# Patient Record
Sex: MALE | Race: WHITE | URBAN - METROPOLITAN AREA
[De-identification: names, ages, dates, MRNs, and addresses within clinical notes are randomized per-mention and may not be internally consistent; named-entity substitution may affect disease eponyms.]

---

## 2019-03-18 ENCOUNTER — HISTORICAL (OUTPATIENT)
Dept: RADIOLOGY | Facility: HOSPITAL | Age: 74
End: 2019-03-18

## 2019-05-06 ENCOUNTER — HISTORICAL (OUTPATIENT)
Dept: RADIOLOGY | Facility: HOSPITAL | Age: 74
End: 2019-05-06

## 2024-04-10 ENCOUNTER — HOSPITAL ENCOUNTER (INPATIENT)
Facility: HOSPITAL | Age: 79
LOS: 7 days | Discharge: NURSING, REHAB OR LONG-TERM CARE | DRG: 481 | End: 2024-04-17
Attending: EMERGENCY MEDICINE | Admitting: INTERNAL MEDICINE
Payer: MEDICARE

## 2024-04-10 DIAGNOSIS — S72.001A CLOSED FRACTURE OF RIGHT HIP, INITIAL ENCOUNTER: Primary | ICD-10-CM
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
## 2024-04-10 DIAGNOSIS — R07.9 CHEST PAIN: ICD-10-CM

## 2024-04-10 DIAGNOSIS — I10 HYPERTENSION: ICD-10-CM

## 2024-04-10 DIAGNOSIS — Z01.818 PREOP TESTING: ICD-10-CM

## 2024-04-10 LAB
ABORH RETYPE: NORMAL
ALBUMIN SERPL-MCNC: 3.3 G/DL (ref 3.4–4.8)
ALBUMIN/GLOB SERPL: 0.8 RATIO (ref 1.1–2)
ALP SERPL-CCNC: 44 UNIT/L (ref 40–150)
ALT SERPL-CCNC: 13 UNIT/L (ref 0–55)
APTT PPP: 28.9 SECONDS (ref 23.2–33.7)
AST SERPL-CCNC: 14 UNIT/L (ref 5–34)
BASOPHILS # BLD AUTO: 0.06 X10(3)/MCL
BASOPHILS NFR BLD AUTO: 0.3 %
BILIRUB SERPL-MCNC: 0.4 MG/DL
BUN SERPL-MCNC: 18.1 MG/DL (ref 8.4–25.7)
CALCIUM SERPL-MCNC: 9.5 MG/DL (ref 8.8–10)
CHLORIDE SERPL-SCNC: 106 MMOL/L (ref 98–107)
CO2 SERPL-SCNC: 23 MMOL/L (ref 23–31)
CREAT SERPL-MCNC: 0.99 MG/DL (ref 0.73–1.18)
EOSINOPHIL # BLD AUTO: 0.02 X10(3)/MCL (ref 0–0.9)
EOSINOPHIL NFR BLD AUTO: 0.1 %
ERYTHROCYTE [DISTWIDTH] IN BLOOD BY AUTOMATED COUNT: 12.7 % (ref 11.5–17)
GFR SERPLBLD CREATININE-BSD FMLA CKD-EPI: >60 MLS/MIN/1.73/M2
GLOBULIN SER-MCNC: 3.9 GM/DL (ref 2.4–3.5)
GLUCOSE SERPL-MCNC: 165 MG/DL (ref 82–115)
GROUP & RH: NORMAL
HCT VFR BLD AUTO: 42.1 % (ref 42–52)
HGB BLD-MCNC: 14.1 G/DL (ref 14–18)
IMM GRANULOCYTES # BLD AUTO: 0.12 X10(3)/MCL (ref 0–0.04)
IMM GRANULOCYTES NFR BLD AUTO: 0.6 %
INDIRECT COOMBS: NORMAL
INR PPP: 1.1
LYMPHOCYTES # BLD AUTO: 1.12 X10(3)/MCL (ref 0.6–4.6)
LYMPHOCYTES NFR BLD AUTO: 5.9 %
MCH RBC QN AUTO: 30.5 PG (ref 27–31)
MCHC RBC AUTO-ENTMCNC: 33.5 G/DL (ref 33–36)
MCV RBC AUTO: 91.1 FL (ref 80–94)
MONOCYTES # BLD AUTO: 0.95 X10(3)/MCL (ref 0.1–1.3)
MONOCYTES NFR BLD AUTO: 5 %
NEUTROPHILS # BLD AUTO: 16.83 X10(3)/MCL (ref 2.1–9.2)
NEUTROPHILS NFR BLD AUTO: 88.1 %
NRBC BLD AUTO-RTO: 0 %
PLATELET # BLD AUTO: 250 X10(3)/MCL (ref 130–400)
PMV BLD AUTO: 9.9 FL (ref 7.4–10.4)
POTASSIUM SERPL-SCNC: 3.7 MMOL/L (ref 3.5–5.1)
PROT SERPL-MCNC: 7.2 GM/DL (ref 5.8–7.6)
PROTHROMBIN TIME: 14 SECONDS (ref 12.5–14.5)
RBC # BLD AUTO: 4.62 X10(6)/MCL (ref 4.7–6.1)
SODIUM SERPL-SCNC: 141 MMOL/L (ref 136–145)
SPECIMEN OUTDATE: NORMAL
WBC # SPEC AUTO: 19.1 X10(3)/MCL (ref 4.5–11.5)

## 2024-04-10 PROCEDURE — 85730 THROMBOPLASTIN TIME PARTIAL: CPT | Performed by: EMERGENCY MEDICINE

## 2024-04-10 PROCEDURE — 25000003 PHARM REV CODE 250: Performed by: INTERNAL MEDICINE

## 2024-04-10 PROCEDURE — 63600175 PHARM REV CODE 636 W HCPCS: Performed by: INTERNAL MEDICINE

## 2024-04-10 PROCEDURE — 80053 COMPREHEN METABOLIC PANEL: CPT

## 2024-04-10 PROCEDURE — 85025 COMPLETE CBC W/AUTO DIFF WBC: CPT

## 2024-04-10 PROCEDURE — 96375 TX/PRO/DX INJ NEW DRUG ADDON: CPT

## 2024-04-10 PROCEDURE — 96374 THER/PROPH/DIAG INJ IV PUSH: CPT

## 2024-04-10 PROCEDURE — 63600175 PHARM REV CODE 636 W HCPCS: Performed by: EMERGENCY MEDICINE

## 2024-04-10 PROCEDURE — 99285 EMERGENCY DEPT VISIT HI MDM: CPT | Mod: 25

## 2024-04-10 PROCEDURE — 86901 BLOOD TYPING SEROLOGIC RH(D): CPT

## 2024-04-10 PROCEDURE — 85610 PROTHROMBIN TIME: CPT

## 2024-04-10 PROCEDURE — 93010 ELECTROCARDIOGRAM REPORT: CPT | Mod: ,,, | Performed by: INTERNAL MEDICINE

## 2024-04-10 PROCEDURE — 11000001 HC ACUTE MED/SURG PRIVATE ROOM

## 2024-04-10 PROCEDURE — 93005 ELECTROCARDIOGRAM TRACING: CPT

## 2024-04-10 PROCEDURE — 99223 1ST HOSP IP/OBS HIGH 75: CPT | Mod: 57,,, | Performed by: ORTHOPAEDIC SURGERY

## 2024-04-10 RX ORDER — CLONIDINE HYDROCHLORIDE 0.2 MG/1
0.2 TABLET ORAL 3 TIMES DAILY PRN
Status: DISCONTINUED | OUTPATIENT
Start: 2024-04-10 | End: 2024-04-17 | Stop reason: HOSPADM

## 2024-04-10 RX ORDER — ENOXAPARIN SODIUM 100 MG/ML
40 INJECTION SUBCUTANEOUS EVERY 24 HOURS
Status: DISCONTINUED | OUTPATIENT
Start: 2024-04-11 | End: 2024-04-17 | Stop reason: HOSPADM

## 2024-04-10 RX ORDER — HYDROCODONE BITARTRATE AND ACETAMINOPHEN 5; 325 MG/1; MG/1
1 TABLET ORAL EVERY 6 HOURS PRN
Status: DISCONTINUED | OUTPATIENT
Start: 2024-04-10 | End: 2024-04-13

## 2024-04-10 RX ORDER — MORPHINE SULFATE 4 MG/ML
4 INJECTION, SOLUTION INTRAMUSCULAR; INTRAVENOUS EVERY 4 HOURS PRN
Status: DISCONTINUED | OUTPATIENT
Start: 2024-04-10 | End: 2024-04-13

## 2024-04-10 RX ORDER — SODIUM CHLORIDE 0.9 % (FLUSH) 0.9 %
10 SYRINGE (ML) INJECTION
Status: DISCONTINUED | OUTPATIENT
Start: 2024-04-11 | End: 2024-04-17 | Stop reason: HOSPADM

## 2024-04-10 RX ORDER — MORPHINE SULFATE 4 MG/ML
4 INJECTION, SOLUTION INTRAMUSCULAR; INTRAVENOUS
Status: COMPLETED | OUTPATIENT
Start: 2024-04-10 | End: 2024-04-10

## 2024-04-10 RX ORDER — AMOXICILLIN 250 MG
2 CAPSULE ORAL 2 TIMES DAILY PRN
Status: DISCONTINUED | OUTPATIENT
Start: 2024-04-11 | End: 2024-04-13

## 2024-04-10 RX ORDER — ALUMINUM HYDROXIDE, MAGNESIUM HYDROXIDE, AND SIMETHICONE 1200; 120; 1200 MG/30ML; MG/30ML; MG/30ML
30 SUSPENSION ORAL 4 TIMES DAILY PRN
Status: DISCONTINUED | OUTPATIENT
Start: 2024-04-11 | End: 2024-04-17 | Stop reason: HOSPADM

## 2024-04-10 RX ORDER — METHOCARBAMOL 500 MG/1
500 TABLET, FILM COATED ORAL 4 TIMES DAILY PRN
Status: DISCONTINUED | OUTPATIENT
Start: 2024-04-10 | End: 2024-04-13

## 2024-04-10 RX ORDER — SODIUM CHLORIDE, SODIUM LACTATE, POTASSIUM CHLORIDE, CALCIUM CHLORIDE 600; 310; 30; 20 MG/100ML; MG/100ML; MG/100ML; MG/100ML
INJECTION, SOLUTION INTRAVENOUS CONTINUOUS
Status: ACTIVE | OUTPATIENT
Start: 2024-04-10 | End: 2024-04-11

## 2024-04-10 RX ORDER — POLYETHYLENE GLYCOL 3350 17 G/17G
17 POWDER, FOR SOLUTION ORAL 2 TIMES DAILY PRN
Status: DISCONTINUED | OUTPATIENT
Start: 2024-04-11 | End: 2024-04-17 | Stop reason: HOSPADM

## 2024-04-10 RX ORDER — ONDANSETRON HYDROCHLORIDE 2 MG/ML
4 INJECTION, SOLUTION INTRAVENOUS EVERY 4 HOURS PRN
Status: DISCONTINUED | OUTPATIENT
Start: 2024-04-10 | End: 2024-04-17 | Stop reason: HOSPADM

## 2024-04-10 RX ORDER — TALC
6 POWDER (GRAM) TOPICAL NIGHTLY PRN
Status: DISCONTINUED | OUTPATIENT
Start: 2024-04-11 | End: 2024-04-17 | Stop reason: HOSPADM

## 2024-04-10 RX ORDER — LABETALOL HYDROCHLORIDE 5 MG/ML
10 INJECTION, SOLUTION INTRAVENOUS EVERY 4 HOURS PRN
Status: DISCONTINUED | OUTPATIENT
Start: 2024-04-10 | End: 2024-04-17 | Stop reason: HOSPADM

## 2024-04-10 RX ORDER — HYDRALAZINE HYDROCHLORIDE 20 MG/ML
10 INJECTION INTRAMUSCULAR; INTRAVENOUS EVERY 4 HOURS PRN
Status: DISCONTINUED | OUTPATIENT
Start: 2024-04-10 | End: 2024-04-17 | Stop reason: HOSPADM

## 2024-04-10 RX ORDER — ACETAMINOPHEN 325 MG/1
650 TABLET ORAL EVERY 4 HOURS PRN
Status: DISCONTINUED | OUTPATIENT
Start: 2024-04-11 | End: 2024-04-17 | Stop reason: HOSPADM

## 2024-04-10 RX ORDER — ONDANSETRON HYDROCHLORIDE 2 MG/ML
4 INJECTION, SOLUTION INTRAVENOUS
Status: COMPLETED | OUTPATIENT
Start: 2024-04-10 | End: 2024-04-10

## 2024-04-10 RX ORDER — PROCHLORPERAZINE EDISYLATE 5 MG/ML
5 INJECTION INTRAMUSCULAR; INTRAVENOUS EVERY 6 HOURS PRN
Status: DISCONTINUED | OUTPATIENT
Start: 2024-04-11 | End: 2024-04-17 | Stop reason: HOSPADM

## 2024-04-10 RX ADMIN — METHOCARBAMOL 500 MG: 500 TABLET ORAL at 11:04

## 2024-04-10 RX ADMIN — ONDANSETRON 4 MG: 2 INJECTION INTRAMUSCULAR; INTRAVENOUS at 09:04

## 2024-04-10 RX ADMIN — MORPHINE SULFATE 4 MG: 4 INJECTION, SOLUTION INTRAMUSCULAR; INTRAVENOUS at 11:04

## 2024-04-10 RX ADMIN — MORPHINE SULFATE 4 MG: 4 INJECTION INTRAVENOUS at 09:04

## 2024-04-10 RX ADMIN — SODIUM CHLORIDE, POTASSIUM CHLORIDE, SODIUM LACTATE AND CALCIUM CHLORIDE: 600; 310; 30; 20 INJECTION, SOLUTION INTRAVENOUS at 11:04

## 2024-04-10 NOTE — Clinical Note
Diagnosis: Closed fracture of right hip, initial encounter [035905]   Future Attending Provider: ISRAEL SULLIVAN [[id number]]   Admit to which facility:: [facility] [[id number]]   Reason for IP Medical Treatment  (Clinical interventions that can only be accomplished in the IP setting? ) :: Orthopedic surgery   I certify that Inpatient services for greater than or equal to 2 midnights are medically necessary:: Yes   Plans for Post-Acute care--if anticipated (pick the single best option):: A. No post acute care anticipated at this time   Special Needs:: Wheelchair or Bed Bound [2]

## 2024-04-11 ENCOUNTER — ANESTHESIA EVENT (OUTPATIENT)
Dept: SURGERY | Facility: HOSPITAL | Age: 79
DRG: 481 | End: 2024-04-11
Payer: MEDICARE

## 2024-04-11 ENCOUNTER — ANESTHESIA (OUTPATIENT)
Dept: SURGERY | Facility: HOSPITAL | Age: 79
DRG: 481 | End: 2024-04-11
Payer: MEDICARE

## 2024-04-11 LAB
ANION GAP SERPL CALC-SCNC: 10 MEQ/L
APPEARANCE UR: CLEAR
BACTERIA #/AREA URNS AUTO: ABNORMAL /HPF
BASOPHILS # BLD AUTO: 0.03 X10(3)/MCL
BASOPHILS NFR BLD AUTO: 0.3 %
BILIRUB UR QL STRIP.AUTO: NEGATIVE
BUN SERPL-MCNC: 17.3 MG/DL (ref 8.4–25.7)
CALCIUM SERPL-MCNC: 9.2 MG/DL (ref 8.8–10)
CHLORIDE SERPL-SCNC: 106 MMOL/L (ref 98–107)
CO2 SERPL-SCNC: 25 MMOL/L (ref 23–31)
COLOR UR AUTO: ABNORMAL
CREAT SERPL-MCNC: 0.9 MG/DL (ref 0.73–1.18)
CREAT/UREA NIT SERPL: 19
EOSINOPHIL # BLD AUTO: 0 X10(3)/MCL (ref 0–0.9)
EOSINOPHIL NFR BLD AUTO: 0 %
ERYTHROCYTE [DISTWIDTH] IN BLOOD BY AUTOMATED COUNT: 12.8 % (ref 11.5–17)
GFR SERPLBLD CREATININE-BSD FMLA CKD-EPI: >60 MLS/MIN/1.73/M2
GLUCOSE SERPL-MCNC: 150 MG/DL (ref 82–115)
GLUCOSE UR QL STRIP.AUTO: NORMAL
HCT VFR BLD AUTO: 37.4 % (ref 42–52)
HGB BLD-MCNC: 12.4 G/DL (ref 14–18)
IMM GRANULOCYTES # BLD AUTO: 0.04 X10(3)/MCL (ref 0–0.04)
IMM GRANULOCYTES NFR BLD AUTO: 0.4 %
KETONES UR QL STRIP.AUTO: ABNORMAL
LEUKOCYTE ESTERASE UR QL STRIP.AUTO: NEGATIVE
LYMPHOCYTES # BLD AUTO: 1.29 X10(3)/MCL (ref 0.6–4.6)
LYMPHOCYTES NFR BLD AUTO: 13 %
MCH RBC QN AUTO: 30.1 PG (ref 27–31)
MCHC RBC AUTO-ENTMCNC: 33.2 G/DL (ref 33–36)
MCV RBC AUTO: 90.8 FL (ref 80–94)
MONOCYTES # BLD AUTO: 0.76 X10(3)/MCL (ref 0.1–1.3)
MONOCYTES NFR BLD AUTO: 7.6 %
MUCOUS THREADS URNS QL MICRO: ABNORMAL /LPF
NEUTROPHILS # BLD AUTO: 7.84 X10(3)/MCL (ref 2.1–9.2)
NEUTROPHILS NFR BLD AUTO: 78.7 %
NITRITE UR QL STRIP.AUTO: NEGATIVE
NRBC BLD AUTO-RTO: 0 %
OHS QRS DURATION: 80 MS
OHS QRS DURATION: 80 MS
OHS QTC CALCULATION: 434 MS
OHS QTC CALCULATION: 442 MS
PH UR STRIP.AUTO: 5 [PH]
PLATELET # BLD AUTO: 196 X10(3)/MCL (ref 130–400)
PMV BLD AUTO: 9.8 FL (ref 7.4–10.4)
POTASSIUM SERPL-SCNC: 4.1 MMOL/L (ref 3.5–5.1)
PROT UR QL STRIP.AUTO: ABNORMAL
RBC # BLD AUTO: 4.12 X10(6)/MCL (ref 4.7–6.1)
RBC #/AREA URNS AUTO: ABNORMAL /HPF
RBC UR QL AUTO: ABNORMAL
SODIUM SERPL-SCNC: 141 MMOL/L (ref 136–145)
SP GR UR STRIP.AUTO: 1.02 (ref 1–1.03)
SQUAMOUS #/AREA URNS LPF: ABNORMAL /HPF
UROBILINOGEN UR STRIP-ACNC: NORMAL
WBC # SPEC AUTO: 9.96 X10(3)/MCL (ref 4.5–11.5)
WBC #/AREA URNS AUTO: ABNORMAL /HPF

## 2024-04-11 PROCEDURE — 93005 ELECTROCARDIOGRAM TRACING: CPT

## 2024-04-11 PROCEDURE — 27201423 OPTIME MED/SURG SUP & DEVICES STERILE SUPPLY: Performed by: ORTHOPAEDIC SURGERY

## 2024-04-11 PROCEDURE — 27245 TREAT THIGH FRACTURE: CPT | Mod: AS,RT,,

## 2024-04-11 PROCEDURE — 85025 COMPLETE CBC W/AUTO DIFF WBC: CPT | Performed by: INTERNAL MEDICINE

## 2024-04-11 PROCEDURE — 63600175 PHARM REV CODE 636 W HCPCS

## 2024-04-11 PROCEDURE — 63600175 PHARM REV CODE 636 W HCPCS: Performed by: INTERNAL MEDICINE

## 2024-04-11 PROCEDURE — C1769 GUIDE WIRE: HCPCS | Performed by: ORTHOPAEDIC SURGERY

## 2024-04-11 PROCEDURE — 25000003 PHARM REV CODE 250

## 2024-04-11 PROCEDURE — 97166 OT EVAL MOD COMPLEX 45 MIN: CPT

## 2024-04-11 PROCEDURE — 25000003 PHARM REV CODE 250: Performed by: INTERNAL MEDICINE

## 2024-04-11 PROCEDURE — 0QS636Z REPOSITION RIGHT UPPER FEMUR WITH INTRAMEDULLARY INTERNAL FIXATION DEVICE, PERCUTANEOUS APPROACH: ICD-10-PCS | Performed by: ORTHOPAEDIC SURGERY

## 2024-04-11 PROCEDURE — 71000033 HC RECOVERY, INTIAL HOUR: Performed by: ORTHOPAEDIC SURGERY

## 2024-04-11 PROCEDURE — 37000009 HC ANESTHESIA EA ADD 15 MINS: Performed by: ORTHOPAEDIC SURGERY

## 2024-04-11 PROCEDURE — 80048 BASIC METABOLIC PNL TOTAL CA: CPT | Performed by: INTERNAL MEDICINE

## 2024-04-11 PROCEDURE — 36000711: Performed by: ORTHOPAEDIC SURGERY

## 2024-04-11 PROCEDURE — D9220A PRA ANESTHESIA: Mod: ANES,,, | Performed by: ANESTHESIOLOGY

## 2024-04-11 PROCEDURE — 27245 TREAT THIGH FRACTURE: CPT | Mod: RT,,, | Performed by: ORTHOPAEDIC SURGERY

## 2024-04-11 PROCEDURE — 93010 ELECTROCARDIOGRAM REPORT: CPT | Mod: ,,, | Performed by: INTERNAL MEDICINE

## 2024-04-11 PROCEDURE — 71000039 HC RECOVERY, EACH ADD'L HOUR: Performed by: ORTHOPAEDIC SURGERY

## 2024-04-11 PROCEDURE — 37000008 HC ANESTHESIA 1ST 15 MINUTES: Performed by: ORTHOPAEDIC SURGERY

## 2024-04-11 PROCEDURE — D9220A PRA ANESTHESIA: Mod: CRNA,,,

## 2024-04-11 PROCEDURE — C1713 ANCHOR/SCREW BN/BN,TIS/BN: HCPCS | Performed by: ORTHOPAEDIC SURGERY

## 2024-04-11 PROCEDURE — 11000001 HC ACUTE MED/SURG PRIVATE ROOM

## 2024-04-11 PROCEDURE — 63600175 PHARM REV CODE 636 W HCPCS: Mod: JZ,JG | Performed by: INTERNAL MEDICINE

## 2024-04-11 PROCEDURE — 71000015 HC POSTOP RECOV 1ST HR: Performed by: ORTHOPAEDIC SURGERY

## 2024-04-11 PROCEDURE — 81001 URINALYSIS AUTO W/SCOPE: CPT | Performed by: INTERNAL MEDICINE

## 2024-04-11 PROCEDURE — 36000710: Performed by: ORTHOPAEDIC SURGERY

## 2024-04-11 PROCEDURE — 63600175 PHARM REV CODE 636 W HCPCS: Performed by: ANESTHESIOLOGY

## 2024-04-11 PROCEDURE — 63600175 PHARM REV CODE 636 W HCPCS: Performed by: ORTHOPAEDIC SURGERY

## 2024-04-11 PROCEDURE — 97162 PT EVAL MOD COMPLEX 30 MIN: CPT

## 2024-04-11 DEVICE — 12MM/130 DEG TI CANN TFNA 170MM - STERILE
Type: IMPLANTABLE DEVICE | Site: FEMUR | Status: FUNCTIONAL
Brand: TFN-ADVANCE

## 2024-04-11 DEVICE — LOCKING SCREW FOR IM NAIL Ø 5MM/ 34MM/ XL25/ STERILE: Type: IMPLANTABLE DEVICE | Site: FEMUR | Status: FUNCTIONAL

## 2024-04-11 DEVICE — TFNA FENESTRATED SCREW 110MM - STERILE
Type: IMPLANTABLE DEVICE | Site: FEMUR | Status: FUNCTIONAL
Brand: TFN-ADVANCE

## 2024-04-11 RX ORDER — SODIUM CHLORIDE, SODIUM GLUCONATE, SODIUM ACETATE, POTASSIUM CHLORIDE AND MAGNESIUM CHLORIDE 30; 37; 368; 526; 502 MG/100ML; MG/100ML; MG/100ML; MG/100ML; MG/100ML
INJECTION, SOLUTION INTRAVENOUS CONTINUOUS
Status: CANCELLED | OUTPATIENT
Start: 2024-04-11 | End: 2024-05-11

## 2024-04-11 RX ORDER — HYDRALAZINE HYDROCHLORIDE 20 MG/ML
10 INJECTION INTRAMUSCULAR; INTRAVENOUS ONCE
Status: COMPLETED | OUTPATIENT
Start: 2024-04-11 | End: 2024-04-11

## 2024-04-11 RX ORDER — ONDANSETRON HYDROCHLORIDE 2 MG/ML
4 INJECTION, SOLUTION INTRAVENOUS DAILY PRN
Status: DISCONTINUED | OUTPATIENT
Start: 2024-04-11 | End: 2024-04-11 | Stop reason: HOSPADM

## 2024-04-11 RX ORDER — TRANEXAMIC ACID 100 MG/ML
INJECTION, SOLUTION INTRAVENOUS
Status: DISCONTINUED | OUTPATIENT
Start: 2024-04-11 | End: 2024-04-11

## 2024-04-11 RX ORDER — MIDAZOLAM HYDROCHLORIDE 2 MG/2ML
2 INJECTION, SOLUTION INTRAMUSCULAR; INTRAVENOUS ONCE AS NEEDED
Status: CANCELLED | OUTPATIENT
Start: 2024-04-11 | End: 2035-09-08

## 2024-04-11 RX ORDER — ACETAMINOPHEN 10 MG/ML
INJECTION, SOLUTION INTRAVENOUS
Status: DISCONTINUED | OUTPATIENT
Start: 2024-04-11 | End: 2024-04-11

## 2024-04-11 RX ORDER — FERROUS SULFATE, DRIED 160(50) MG
1 TABLET, EXTENDED RELEASE ORAL 2 TIMES DAILY
Status: DISCONTINUED | OUTPATIENT
Start: 2024-04-11 | End: 2024-04-17 | Stop reason: HOSPADM

## 2024-04-11 RX ORDER — VANCOMYCIN HYDROCHLORIDE 1 G/20ML
INJECTION, POWDER, LYOPHILIZED, FOR SOLUTION INTRAVENOUS
Status: DISCONTINUED | OUTPATIENT
Start: 2024-04-11 | End: 2024-04-11 | Stop reason: HOSPADM

## 2024-04-11 RX ORDER — EPHEDRINE SULFATE 50 MG/ML
INJECTION, SOLUTION INTRAVENOUS
Status: DISCONTINUED | OUTPATIENT
Start: 2024-04-11 | End: 2024-04-11

## 2024-04-11 RX ORDER — LABETALOL HYDROCHLORIDE 5 MG/ML
10 INJECTION, SOLUTION INTRAVENOUS ONCE
Status: CANCELLED | OUTPATIENT
Start: 2024-04-11 | End: 2024-04-11

## 2024-04-11 RX ORDER — METHYLPREDNISOLONE 4 MG/1
4 TABLET ORAL DAILY
Status: DISCONTINUED | OUTPATIENT
Start: 2024-04-11 | End: 2024-04-17 | Stop reason: HOSPADM

## 2024-04-11 RX ORDER — FENTANYL CITRATE 50 UG/ML
INJECTION, SOLUTION INTRAMUSCULAR; INTRAVENOUS
Status: DISCONTINUED | OUTPATIENT
Start: 2024-04-11 | End: 2024-04-11

## 2024-04-11 RX ORDER — ROCURONIUM BROMIDE 10 MG/ML
INJECTION, SOLUTION INTRAVENOUS
Status: DISCONTINUED | OUTPATIENT
Start: 2024-04-11 | End: 2024-04-11

## 2024-04-11 RX ORDER — LIDOCAINE HYDROCHLORIDE 10 MG/ML
1 INJECTION, SOLUTION EPIDURAL; INFILTRATION; INTRACAUDAL; PERINEURAL ONCE
Status: CANCELLED | OUTPATIENT
Start: 2024-04-11 | End: 2024-04-11

## 2024-04-11 RX ORDER — LABETALOL HYDROCHLORIDE 5 MG/ML
10 INJECTION, SOLUTION INTRAVENOUS ONCE
Status: COMPLETED | OUTPATIENT
Start: 2024-04-11 | End: 2024-04-11

## 2024-04-11 RX ORDER — HYDRALAZINE HYDROCHLORIDE 20 MG/ML
10 INJECTION INTRAMUSCULAR; INTRAVENOUS ONCE
Status: CANCELLED | OUTPATIENT
Start: 2024-04-11

## 2024-04-11 RX ORDER — HYDROMORPHONE HYDROCHLORIDE 2 MG/ML
0.2 INJECTION, SOLUTION INTRAMUSCULAR; INTRAVENOUS; SUBCUTANEOUS EVERY 5 MIN PRN
Status: DISCONTINUED | OUTPATIENT
Start: 2024-04-11 | End: 2024-04-11 | Stop reason: HOSPADM

## 2024-04-11 RX ORDER — METHYLPREDNISOLONE SOD SUCC 125 MG
VIAL (EA) INJECTION
Status: DISPENSED
Start: 2024-04-11 | End: 2024-04-11

## 2024-04-11 RX ORDER — METHYLPREDNISOLONE 4 MG/1
4 TABLET ORAL DAILY
COMMUNITY
Start: 2024-03-18

## 2024-04-11 RX ORDER — ONDANSETRON 4 MG/1
4 TABLET, ORALLY DISINTEGRATING ORAL ONCE
Status: CANCELLED | OUTPATIENT
Start: 2024-04-11 | End: 2024-04-11

## 2024-04-11 RX ORDER — DIPHENHYDRAMINE HYDROCHLORIDE 50 MG/ML
25 INJECTION INTRAMUSCULAR; INTRAVENOUS EVERY 6 HOURS PRN
Status: DISCONTINUED | OUTPATIENT
Start: 2024-04-11 | End: 2024-04-11 | Stop reason: HOSPADM

## 2024-04-11 RX ORDER — CEFAZOLIN SODIUM 1 G/3ML
INJECTION, POWDER, FOR SOLUTION INTRAMUSCULAR; INTRAVENOUS
Status: DISCONTINUED | OUTPATIENT
Start: 2024-04-11 | End: 2024-04-11

## 2024-04-11 RX ORDER — METOCLOPRAMIDE HYDROCHLORIDE 5 MG/ML
10 INJECTION INTRAMUSCULAR; INTRAVENOUS EVERY 10 MIN PRN
Status: DISCONTINUED | OUTPATIENT
Start: 2024-04-11 | End: 2024-04-11 | Stop reason: HOSPADM

## 2024-04-11 RX ORDER — HYDROMORPHONE HYDROCHLORIDE 2 MG/ML
0.2 INJECTION, SOLUTION INTRAMUSCULAR; INTRAVENOUS; SUBCUTANEOUS EVERY 5 MIN PRN
Status: COMPLETED | OUTPATIENT
Start: 2024-04-11 | End: 2024-04-11

## 2024-04-11 RX ORDER — ONDANSETRON HYDROCHLORIDE 2 MG/ML
INJECTION, SOLUTION INTRAVENOUS
Status: DISCONTINUED | OUTPATIENT
Start: 2024-04-11 | End: 2024-04-11

## 2024-04-11 RX ORDER — PROPOFOL 10 MG/ML
VIAL (ML) INTRAVENOUS
Status: DISCONTINUED | OUTPATIENT
Start: 2024-04-11 | End: 2024-04-11

## 2024-04-11 RX ORDER — MIDAZOLAM HYDROCHLORIDE 2 MG/2ML
2 INJECTION, SOLUTION INTRAMUSCULAR; INTRAVENOUS ONCE AS NEEDED
Status: CANCELLED | OUTPATIENT
Start: 2024-04-11 | End: 2035-09-07

## 2024-04-11 RX ORDER — PHENYLEPHRINE HYDROCHLORIDE 10 MG/ML
INJECTION INTRAVENOUS
Status: DISCONTINUED | OUTPATIENT
Start: 2024-04-11 | End: 2024-04-11

## 2024-04-11 RX ORDER — METHOCARBAMOL 100 MG/ML
1000 INJECTION, SOLUTION INTRAMUSCULAR; INTRAVENOUS ONCE
Status: COMPLETED | OUTPATIENT
Start: 2024-04-11 | End: 2024-04-11

## 2024-04-11 RX ORDER — CEFAZOLIN SODIUM 2 G/50ML
2 SOLUTION INTRAVENOUS
Status: COMPLETED | OUTPATIENT
Start: 2024-04-11 | End: 2024-04-12

## 2024-04-11 RX ORDER — LIDOCAINE HYDROCHLORIDE 20 MG/ML
INJECTION, SOLUTION EPIDURAL; INFILTRATION; INTRACAUDAL; PERINEURAL
Status: DISCONTINUED | OUTPATIENT
Start: 2024-04-11 | End: 2024-04-11

## 2024-04-11 RX ORDER — LISINOPRIL 10 MG/1
10 TABLET ORAL DAILY
Status: DISCONTINUED | OUTPATIENT
Start: 2024-04-11 | End: 2024-04-13

## 2024-04-11 RX ORDER — HEPARIN 100 UNIT/ML
5 SYRINGE INTRAVENOUS
Status: DISCONTINUED | OUTPATIENT
Start: 2024-04-11 | End: 2024-04-17 | Stop reason: HOSPADM

## 2024-04-11 RX ADMIN — HYDROMORPHONE HYDROCHLORIDE 0.2 MG: 2 INJECTION, SOLUTION INTRAMUSCULAR; INTRAVENOUS; SUBCUTANEOUS at 11:04

## 2024-04-11 RX ADMIN — PHENYLEPHRINE HYDROCHLORIDE 100 MCG: 10 INJECTION INTRAVENOUS at 08:04

## 2024-04-11 RX ADMIN — LABETALOL HYDROCHLORIDE 10 MG: 5 INJECTION, SOLUTION INTRAVENOUS at 11:04

## 2024-04-11 RX ADMIN — ONDANSETRON 4 MG: 2 INJECTION INTRAMUSCULAR; INTRAVENOUS at 09:04

## 2024-04-11 RX ADMIN — PROPOFOL 50 MG: 10 INJECTION, EMULSION INTRAVENOUS at 09:04

## 2024-04-11 RX ADMIN — ROCURONIUM BROMIDE 50 MG: 10 SOLUTION INTRAVENOUS at 08:04

## 2024-04-11 RX ADMIN — HYDROMORPHONE HYDROCHLORIDE 0.2 MG: 2 INJECTION, SOLUTION INTRAMUSCULAR; INTRAVENOUS; SUBCUTANEOUS at 10:04

## 2024-04-11 RX ADMIN — ENOXAPARIN SODIUM 40 MG: 40 INJECTION SUBCUTANEOUS at 06:04

## 2024-04-11 RX ADMIN — FENTANYL CITRATE 100 MCG: 50 INJECTION, SOLUTION INTRAMUSCULAR; INTRAVENOUS at 08:04

## 2024-04-11 RX ADMIN — PROPOFOL 200 MG: 10 INJECTION, EMULSION INTRAVENOUS at 08:04

## 2024-04-11 RX ADMIN — CEFAZOLIN SODIUM 2 G: 2 SOLUTION INTRAVENOUS at 06:04

## 2024-04-11 RX ADMIN — MORPHINE SULFATE 4 MG: 4 INJECTION, SOLUTION INTRAMUSCULAR; INTRAVENOUS at 02:04

## 2024-04-11 RX ADMIN — CEFAZOLIN 2 G: 330 INJECTION, POWDER, FOR SOLUTION INTRAMUSCULAR; INTRAVENOUS at 08:04

## 2024-04-11 RX ADMIN — MORPHINE SULFATE 4 MG: 4 INJECTION, SOLUTION INTRAMUSCULAR; INTRAVENOUS at 04:04

## 2024-04-11 RX ADMIN — Medication 6 MG: at 08:04

## 2024-04-11 RX ADMIN — EPHEDRINE SULFATE 5 MG: 50 INJECTION INTRAVENOUS at 09:04

## 2024-04-11 RX ADMIN — SUGAMMADEX 200 MG: 100 INJECTION, SOLUTION INTRAVENOUS at 09:04

## 2024-04-11 RX ADMIN — METHOCARBAMOL 1000 MG: 100 INJECTION INTRAMUSCULAR; INTRAVENOUS at 10:04

## 2024-04-11 RX ADMIN — ONDANSETRON 4 MG: 2 INJECTION INTRAMUSCULAR; INTRAVENOUS at 04:04

## 2024-04-11 RX ADMIN — HYDRALAZINE HYDROCHLORIDE 10 MG: 20 INJECTION INTRAMUSCULAR; INTRAVENOUS at 08:04

## 2024-04-11 RX ADMIN — METHOCARBAMOL 500 MG: 500 TABLET ORAL at 11:04

## 2024-04-11 RX ADMIN — Medication 1 TABLET: at 02:04

## 2024-04-11 RX ADMIN — HYDROCODONE BITARTRATE AND ACETAMINOPHEN 1 TABLET: 5; 325 TABLET ORAL at 06:04

## 2024-04-11 RX ADMIN — ACETAMINOPHEN 1000 MG: 10 INJECTION, SOLUTION INTRAVENOUS at 09:04

## 2024-04-11 RX ADMIN — EPHEDRINE SULFATE 10 MG: 50 INJECTION INTRAVENOUS at 09:04

## 2024-04-11 RX ADMIN — LISINOPRIL 10 MG: 10 TABLET ORAL at 11:04

## 2024-04-11 RX ADMIN — Medication 1 TABLET: at 08:04

## 2024-04-11 RX ADMIN — LIDOCAINE HYDROCHLORIDE 100 ML: 20 INJECTION, SOLUTION INTRAVENOUS at 08:04

## 2024-04-11 RX ADMIN — TRANEXAMIC ACID 1000 MG: 100 INJECTION, SOLUTION INTRAVENOUS at 09:04

## 2024-04-11 RX ADMIN — LABETALOL HYDROCHLORIDE 10 MG: 5 INJECTION, SOLUTION INTRAVENOUS at 08:04

## 2024-04-11 RX ADMIN — METHOCARBAMOL 500 MG: 500 TABLET ORAL at 02:04

## 2024-04-11 RX ADMIN — PHENYLEPHRINE HYDROCHLORIDE 200 MCG: 10 INJECTION INTRAVENOUS at 08:04

## 2024-04-11 RX ADMIN — SODIUM CHLORIDE, SODIUM GLUCONATE, SODIUM ACETATE, POTASSIUM CHLORIDE AND MAGNESIUM CHLORIDE: 526; 502; 368; 37; 30 INJECTION, SOLUTION INTRAVENOUS at 08:04

## 2024-04-11 RX ADMIN — SODIUM CHLORIDE 100 MG: 9 INJECTION, SOLUTION INTRAVENOUS at 08:04

## 2024-04-11 RX ADMIN — SODIUM CHLORIDE, SODIUM GLUCONATE, SODIUM ACETATE, POTASSIUM CHLORIDE AND MAGNESIUM CHLORIDE: 526; 502; 368; 37; 30 INJECTION, SOLUTION INTRAVENOUS at 09:04

## 2024-04-11 NOTE — TRANSFER OF CARE
"Anesthesia Transfer of Care Note    Patient: Terence Tanner Jr.    Procedure(s) Performed: Procedure(s) (LRB):  INSERTION, INTRAMEDULLARY ROSE MARY, FEMUR (Right)    Patient location: PACU    Anesthesia Type: general    Transport from OR: Transported from OR on room air with adequate spontaneous ventilation    Post pain: adequate analgesia    Post assessment: no apparent anesthetic complications and tolerated procedure well    Post vital signs: stable    Level of consciousness: responds to stimulation    Nausea/Vomiting: no nausea/vomiting    Complications: none    Transfer of care protocol was followed      Last vitals: Visit Vitals  BP (!) 114/51   Pulse 84   Temp 36.9 °C (98.4 °F)   Resp 18   Ht 5' 9" (1.753 m)   Wt 70.3 kg (155 lb)   SpO2 (!) 94%   BMI 22.89 kg/m²     "

## 2024-04-11 NOTE — PLAN OF CARE
04/11/24 1530   Discharge Assessment   Assessment Type Discharge Planning Assessment   Confirmed/corrected address, phone number and insurance Yes   Confirmed Demographics Correct on Facesheet   Source of Information family  (pt's wife, balaji)   Communicated LIZABETH with patient/caregiver Date not available/Unable to determine   Reason For Admission Femur fx   People in Home spouse  (Pt lives with his wife, balaji in a mobile home with a ramp)   Do you expect to return to your current living situation? No  (pt will need placement)   Do you have help at home or someone to help you manage your care at home? No  (pt's wife reports physically she can not assist pt)   Prior to hospitilization cognitive status: Unable to Assess   Current cognitive status: Alert/Oriented   Walking or Climbing Stairs Difficulty yes   Walking or Climbing Stairs ambulation difficulty, requires equipment   Mobility Management rollator, cane, and transport chair   Dressing/Bathing Difficulty no   Equipment Currently Used at Home rollator;cane, straight;other (see comments)  (transport chair)   Readmission within 30 days? No   Patient currently being followed by outpatient case management? No   Do you currently have service(s) that help you manage your care at home? No   Do you take prescription medications? Yes   Do you have prescription coverage? Yes   Coverage medicare   Who is going to help you get home at discharge? pt's wife,Balaji if pt is not placed   How do you get to doctors appointments? family or friend will provide   Are you on dialysis? No   Discharge Plan A Rehab   Discharge Plan B Skilled Nursing Facility   DME Needed Upon Discharge  none   Discharge Plan discussed with: Spouse/sig other   Name(s) and Number(s) Balaji---[phone] or [phone]   Transition of Care Barriers None   Housing Stability   In the last 12 months, was there a time when you were not able to pay the mortgage or rent on time? N   Transportation Needs   In the past 12  months, has lack of transportation kept you from medical appointments or from getting medications? no   Food Insecurity   Within the past 12 months, you worried that your food would run out before you got the money to buy more. Never true   OTHER   Name(s) of People in Home Balaji, wife     Pt's PCP is Dr Yarbrough. Pt's  is his wife, balaji ([phone] or [phone]). Pt had HH services in the past however does not recall name of HH agency. Pt use [facility] in [place]. Pt does not drive. Wait on therapy recs. CM to follow

## 2024-04-11 NOTE — PROGRESS NOTES
"Ortho   R IT hip fx  No acute events overnight.    Pain controlled.    Resting in bed.   Ambulating with PT.    Vital Signs  Temp: 98.4 °F (36.9 °C)  Pulse: (!) 126  Resp: 18  SpO2: 97 %  Device (Oxygen Therapy): room air  BP: (!) 218/132  BP Location: Right arm  BP Method: Automatic  Patient Position: Lying  Arousal Level: opens eyes spontaneously  Height and Weight  Height: 5' 9" (175.3 cm)  Height Method: Stated  Weight: 70.3 kg (155 lb)  Weight Method: Stated  BSA (Calculated - sq m): 1.85 sq meters  BMI (Calculated): 22.9  Weight in (lb) to have BMI = 25: 168.9]    Right Lower extremity compartment soft and warm  No s/s of DVT or infection  TTP hip  NVI distally    Recent Lab Results  (Last 5 results in the past 24 hours)        04/11/24  0447   04/11/24  0422   04/10/24  2214   04/10/24  2130   04/10/24  2102        Albumin/Globulin Ratio         0.8       ABO and RH     A POS           Albumin         3.3       ALP         44       ALT         13       Anion Gap 10.0               Appearance, UA   Clear             PTT       28.9  Comment: For Minimal Heparin Infusion, the goal aPTT 64-85 seconds corresponds to an anti-Xa of 0.3-0.5.    For Low Intensity and High Intensity Heparin, the goal aPTT  seconds corresponds to an anti-Xa of 0.3-0.7         AST         14       Bacteria, UA   None Seen             Baso # 0.03         0.06       Basophil % 0.3         0.3       BILIRUBIN TOTAL         0.4       Bilirubin, UA   Negative             BUN 17.3         18.1       BUN/CREAT RATIO 19               Calcium 9.2         9.5       Chloride 106         106       CO2 25         23       Color, UA   Light-Yellow             Creatinine 0.90         0.99       eGFR >60         >60       Eos # 0.00         0.02       Eos % 0.0         0.1       Globulin, Total         3.9       Glucose 150         165       Glucose, UA   Normal             Group & Rh         A POS       Hematocrit 37.4         42.1       " Hemoglobin 12.4         14.1       Immature Grans (Abs) 0.04         0.12       Immature Granulocytes 0.4         0.6       Indirect Oliver GEL         NEG       INR       1.1         Ketones, UA   Trace             Leukocyte Esterase, UA   Negative             Lymph # 1.29         1.12       LYMPH % 13.0         5.9       MCH 30.1         30.5       MCHC 33.2         33.5       MCV 90.8         91.1       Mono # 0.76         0.95       Mono % 7.6         5.0       MPV 9.8         9.9       Mucous, UA   Trace             Neut # 7.84         16.83       Neut % 78.7         88.1       NITRITE UA   Negative             nRBC 0.0         0.0       Blood, UA   1+             pH, UA   5.0             Platelet Count 196         250       Potassium 4.1         3.7       PROTEIN TOTAL         7.2       Protein, UA   1+             PT       14.0         RBC 4.12         4.62       RBC, UA   0-5             RDW 12.8         12.7       Sodium 141         141       Specific Gravity,UA   1.025             Specimen Outdate         04/13/2024 23:59       Squamous Epithelial Cells, UA   None Seen             Urobilinogen, UA   Normal             WBC, UA   0-5             WBC 9.96         19.10                              A/P:    Plan for OR this am , right Hip fx

## 2024-04-11 NOTE — OP NOTE
[facility]                       [address]                      [place] [address]    PATIENT NAME:      SEAN SOLIS  YOB: 1945  CSN:               [id number]  MRN:               [id number]  ADMIT DATE:        04/10/2024 20:51:00  PHYSICIAN:         Stu Tatum MD                          OPERATIVE REPORT      DATE OF SURGERY:    04/11/2024 00:00:00    SURGEON:  Stu Tatum MD    PREOPERATIVE DIAGNOSIS:  Right displaced intertrochanteric femur fracture.    POSTOPERATIVE DIAGNOSIS:  Right displaced intertrochanteric femur fracture.    PROCEDURE:  Intramedullary nailing of right intertrochanteric femur fracture.    SURGEON:  Stu Tatum MD    ANESTHESIA:  General.    ESTIMATED BLOOD LOSS:  100 mL.    ASSISTANT:  JOEY Nur, necessary for a skilled set of hands to assist   with reduction of the fracture as well as application of hardware and deep   closure.    IMPLANTS:  Synthes short TFNA 12 mm with a single distal interlocking screw.    COMPLICATIONS:  None.    COUNTS:  All counts correct x2 at the end of the case.    INDICATIONS FOR PROCEDURE:  Mr. Flores chan is a 79-year-old male with a fall   at home, sustained a right intertrochanteric femur fracture, seen and evaluated   in the emergency department.  He was admitted to the hospital medicine service.    Orthopedics was consulted for management.  He was seen initially by my   partner, Dr. Ankit Barrientos.  I have agreed to assume care of his injury due to my   earlier operative availability.  The risks and benefits of treatment were   discussed.  He understood and agreed with our plan.    PROCEDURE IN DETAIL:  After informed consent was obtained, the patient was met   in the preoperative holding area and site was marked.  He was taken to the   operating room.  General anesthesia was induced.  He was then placed onto the   operating room table and pulled  against the perineal post in the traction spars   on the [place] table.  His right lower extremity was evaluated on fluoroscopy.  His   overall alignment was confirmed to be appropriate.  It was prepped and draped   in a standard sterile fashion.  Preoperative antibiotics were given.  A time-out   was done indicating correct operative limb and procedure.  Incision was made   over the lateral aspect of the hip.  It was carried down to the tip of the   greater trochanter.  The starting point for trochanteric entry nail was   obtained.  Opening reamer was passed.  The nail was slid into position.  A   percutaneously applied bone hook was able to reduce the calcar back to its   natural position.  A guidewire was placed centered within the femoral neck and   head.  The length was measured and radial was passed.  Screw was applied.  The   derotation wires were removed.  External compression was performed.  A good   overall alignment was confirmed to be appropriate on AP and lateral imaging.    The set screw was tightened.  A single distal interlocking screw was placed.    The wounds were thoroughly irrigated.  Final fluoroscopic images were obtained   confirming good overall alignment in appropriate hardware position.  They were   closed in a layered fashion with #1 Vicryl, 2-0 Monocryl.  Staples, Xeroform,   and island dressings were applied.  The patient was taken out of the traction   spars, awakened, extubated, and taken to recovery in stable condition.    POSTOPERATIVE PLAN:  He will be admitted to the floor.  He can weightbear as   tolerated to the right lower extremity.  Full range of motion of the right lower   extremity.  Lovenox for DVT prophylaxis.  Okay for oral anticoagulation if   indicated.  He will need enteric-coated aspirin 81 mg p.o. b.i.d. upon   discharge.        ______________________________  MD JAMMIE Gonsalez/LUCEROS  DD:  04/11/2024  Time:  09:52AM  DT:  04/11/2024  Time:  01:20PM  Job #:   [id number]      OPERATIVE REPORT

## 2024-04-11 NOTE — PROGRESS NOTES
[facility] MEDICINE ~ PROGRESS NOTE        CHIEF COMPLAINT   Hospital follow up    HOSPITAL COURSE   79-year-old male with medical history of chronic rheumatoid arthritis, osteoarthritis with deformities on chronic methylprednisolone 4 mg daily present to the ED after a ground level fall with complaint of right hip pain.  Report he has significant arthritis and bowing at his knees and while trying to stand up he slipped and fell landing on his right side he immediately started having right hip pain and deformed hip.  Denies hitting his head or losing consciousness      On arrival to ED he was afebrile and hypertensive.  Labs notable for WBC 19.10, CMP unremarkable, urinalysis unremarkable.  Chest x-ray show no airspace disease, right hip x-ray showed right intertrochanteric femur fracture.     Orthopedic surgery consulted and planned for operative management tomorrow.  Referred to hospital medicine service for further evaluation and management.     He denies any history of heart disease or prior interventions, lung disease, or prior reaction to anesthesia.    Today  Seen examined this morning.  Not functional at baseline.  No active chest pain or cardiac symptoms.  States that his baseline blood pressure is about 160 but does not take any medications.        OBJECTIVE/PHYSICAL EXAM     VITAL SIGNS (MOST RECENT):  Temp: 97.5 °F (36.4 °C) (04/11/24 1004)  Pulse: 82 (04/11/24 1030)  Resp: 16 (04/11/24 1035)  BP: (!) 164/78 (04/11/24 1030)  SpO2: 99 % (04/11/24 1030) VITAL SIGNS (24 HOUR RANGE):  Temp:  [97.5 °F (36.4 °C)-98.4 °F (36.9 °C)] 97.5 °F (36.4 °C)  Pulse:  [] 82  Resp:  [11-20] 16  SpO2:  [94 %-99 %] 99 %  BP: (114-218)/() 164/78   GENERAL: In no acute distress, afebrile  HEENT:  CHEST: Clear to auscultation bilaterally  HEART: S1, S2, no appreciable murmur  ABDOMEN: Soft, nontender, BS +  MSK: Warm, no lower extremity edema, no clubbing or  cyanosis  NEUROLOGIC: Alert and oriented x4, moving all extremities with good strength   INTEGUMENTARY:  PSYCHIATRY:        ASSESSMENT/PLAN   Closed right intertrochanteric femur fracture  Ground level fall  Hypertension-probably secondary to pain  Reactive leukocytosis/stress response      History osteoarthritis and rheumatoid arthritis on chronic methylprednisolone      Orthopedic surgery following.  Planning for surgical intervention today.    Patient is medically optimized at this time, we will continue to adjust blood pressure medications as needed.  Start lisinopril 10 mg.  Continue methylprednisolone 4 mg daily per home dose.  Add calcium and vitamin-D tablet for chronic steroids.    PT and OT once cleared by ortho    DVT prophylaxis:  Lovenox 40    Anticipated discharge and disposition:   __________________________________________________________________________    NUTRITIONAL STATUS     Patient meets ASPEN criteria for   malnutrition of   per RD assessment as evidenced by:                       A minimum of two characteristics is recommended for diagnosis of either severe or non-severe malnutrition.     LABS/MICRO/MEDS/DIAGNOSTICS       LABS  Recent Labs     04/10/24  2102 04/11/24  0447    141   K 3.7 4.1   CHLORIDE 106 106   CO2 23 25   BUN 18.1 17.3   CREATININE 0.99 0.90   GLUCOSE 165* 150*   CALCIUM 9.5 9.2   ALKPHOS 44  --    AST 14  --    ALT 13  --    ALBUMIN 3.3*  --      Recent Labs     04/11/24  0447   WBC 9.96   RBC 4.12*   HCT 37.4*   MCV 90.8          MICROBIOLOGY  Microbiology Results (last 7 days)       ** No results found for the last 168 hours. **               MEDICATIONS   ceFAZolin (Ancef) IV (PEDS and ADULTS)  2 g Intravenous Q8H    enoxparin  40 mg Subcutaneous Daily    methylPREDNISolone sodium succinate        methylPREDNISolone  4 mg Oral Daily         INFUSIONS   lactated ringers 75 mL/hr at 04/10/24 7090          DIAGNOSTIC TESTS  SURG FL Surgery Fluoro Usage   Final  "Result      X-Ray Knee Complete 4 or More Views Left   Final Result      Tricompartmental degenerative changes.      Suprapatellar effusion.         Electronically signed by: Rachid Otero   Date:    04/11/2024   Time:    08:58      X-Ray Knee 1 or 2 View Right   Final Result      Tricompartmental degenerative changes.         Electronically signed by: Rachid Otero   Date:    04/11/2024   Time:    08:56      X-Ray Chest 1 View   Final Result      NO ACUTE CARDIOPULMONARY PROCESS IDENTIFIED.         Electronically signed by: Orion Pettit   Date:    04/10/2024   Time:    22:09      X-Ray Hip 2 or 3 views Right (with Pelvis when performed)   Final Result      Right intertrochanteric fracture.         Electronically signed by: Orion Pettit   Date:    04/10/2024   Time:    22:10      X-Ray Femur 2 View Right    (Results Pending)        No results found for: "EF"         Case related differential diagnoses have been reviewed; assessment and plan has been documented. I have personally reviewed the labs and test results that are currently available; I have reviewed the patients medication list. I have reviewed the consulting providers recommendations. I have reviewed or attempted to review medical records based upon their availability.  All of the patient's and/or family's questions have been addressed and answered to the best of my ability.  I will continue to monitor closely and make adjustments to medical management as needed.  This document was created using M*Modal Fluency Direct.  Transcription errors may have been made.  Please contact me if any questions may rise regarding documentation to clarify transcription.        Derrek Rose MD   Internal Medicine  Department of Hospital Medicine [facility] - Periop Services               "

## 2024-04-11 NOTE — ANESTHESIA POSTPROCEDURE EVALUATION
Anesthesia Post Evaluation    Patient: Terence Tanner Jr.    Procedure(s) Performed: Procedure(s) (LRB):  INSERTION, INTRAMEDULLARY ROSE MARY, FEMUR (Right)    Final Anesthesia Type: general      Patient location during evaluation: PACU  Patient participation: Yes- Able to Participate  Level of consciousness: awake and alert and oriented  Post-procedure vital signs: reviewed and stable  Pain management: adequate  Airway patency: patent  VIOLETA mitigation strategies: Verification of full reversal of neuromuscular block  PONV status at discharge: No PONV  Anesthetic complications: no      Cardiovascular status: blood pressure returned to baseline and stable  Respiratory status: spontaneous ventilation and unassisted  Hydration status: euvolemic  Follow-up not needed.  Comments: [facility]              Vitals Value Taken Time   /86 04/11/24 1041   Temp 36.4 °C (97.5 °F) 04/11/24 1004   Pulse 91 04/11/24 1041   Resp 12 04/11/24 1041   SpO2 98 % 04/11/24 1041   Vitals shown include unvalidated device data.      No case tracking events are documented in the log.      Pain/Allan Score: Pain Rating Prior to Med Admin: 8 (4/11/2024 10:35 AM)  Allan Score: 9 (4/11/2024 10:30 AM)

## 2024-04-11 NOTE — ANESTHESIA PREPROCEDURE EVALUATION
"                                                                                                             04/11/2024  Terence Tanner Jr. is a 79 y.o., male.   Fall       Presents with c/o right hip deformity. Right leg shortened and rotated. +2 pedal pulses. Denies hitting head.          HPI:  Patient is a 79-year-old male who was at home earlier today, lost balance and fell in his right hip.  He does have a history of falls.  He does have a history of daily steroid use secondary to rheumatoid arthritis.  Diagnosis:   Closed hip fracture requiring operative repair, right, initial encounter [S72.001A]   Pre-op diagnosis: Closed hip fracture requiring operative repair, right, initial encounter [S72.001A]       The pt. comes to [facility] for the noted procedure under [facility].  Due to chronic steroid use, will cover with "Stress dose", Hydrocortisone perioperatively.  Procedure:   RIGHT HIP IMNAIL (Right) - HANA TABLE.   DR. ASHLEY FREY DO CASE   Anesthesia type: General       PMHx:  Pertinent Medical hx, not documented except for:    He does have a history of falls.  He does have a history of (chronic)daily steroid use secondary to rheumatoid arthritis.  Hypertension  Denies Cardiac or Pulmon. Ds.  + h/o ETOH and Tobacco in the past.     Surgical History[facility]  Hernia Repair  Colon resection(?, Divertic. Ds.)       Vital signs:        Lab Data:      EKG:        Pre-op Assessment    I have reviewed the Patient Summary Reports.     I have reviewed the Nursing Notes. I have reviewed the NPO Status.   I have reviewed the Medications.     Review of Systems  Anesthesia Hx:  No problems with previous Anesthesia                Social:  Former Smoker, Non-Smoker       Hematology/Oncology:  Hematology Normal   Oncology Normal                                   EENT/Dental:  EENT/Dental Normal           Cardiovascular:  Cardiovascular Normal Exercise tolerance: good                   Functional Capacity good / => 4 METS                   "       Pulmonary:  Pulmonary Normal                       Renal/:  Renal/ Normal                 Hepatic/GI:  Hepatic/GI Normal                 Musculoskeletal:  Arthritis   RA on daily steroids.            Neurological:  Neurology Normal                                      Endocrine:  Endocrine Normal            Dermatological:  Skin Normal    Psych:  Psychiatric Normal                    Physical Exam  General: Alert, Oriented, Well nourished and Cooperative    Airway:  Mallampati: II   Mouth Opening: Normal  TM Distance: Normal  Tongue: Normal  Neck ROM: Normal ROM    Dental:  Edentulous    Chest/Lungs:  Clear to auscultation, Normal Respiratory Rate    Heart:  Rate: Normal  Rhythm: Regular Rhythm        Anesthesia Plan  Type of Anesthesia, risks & benefits discussed:    Anesthesia Type: Gen ETT  Intra-op Monitoring Plan: Standard ASA Monitors  Post Op Pain Control Plan: IV/PO Opioids PRN  Induction:  IV and Inhalation  Airway Plan: Direct  Informed Consent: Informed consent signed with the Patient and all parties understand the risks and agree with anesthesia plan.  All questions answered. Patient consented to blood products? Yes  ASA Score: 2  Day of Surgery Review of History & Physical: H&P Update referred to the surgeon/provider.    Ready For Surgery From Anesthesia Perspective.     .

## 2024-04-11 NOTE — PLAN OF CARE
Problem: Occupational Therapy  Goal: Occupational Therapy Goal  Description: Goals to be met 5/11/224    Pt will complete grooming seated at sink with LRAD with SBA.  Pt will complete UB dressing with SBA.  Pt will complete LB dressing with SBA using LRAD.  Pt will complete toileting with SBA using LRAD.  Pt will complete bedside commode transfer with SBA using LRAD.    Outcome: Ongoing, Progressing

## 2024-04-11 NOTE — NURSING
Nurses Note -- 4 Eyes      4/11/2024   2:48 PM      Skin assessed during: Admit      [] No Altered Skin Integrity Present    []Prevention Measures Documented      [x] Yes- Altered Skin Integrity Present or Discovered   [] LDA Added if Not in Epic (Describe Wound)   [x] New Altered Skin Integrity was Present on Admit and Documented in LDA   [] Wound Image Taken    Wound Care Consulted? No, surgical incision    Attending Nurse:  Cristela Warner RN    Second RN/Staff Member:  KIRSTIN Travis

## 2024-04-11 NOTE — PT/OT/SLP EVAL
Physical Therapy Evaluation    Patient Name:  Terence Tanner Jr.   MRN:  [id number]    Recommendations:     Discharge therapy intensity: Moderate Intensity Therapy   Discharge Equipment Recommendations:  (TBD)   Barriers to discharge: Decreased caregiver support, Impaired mobility, and Ongoing medical needs    Assessment:     Terence Tanner Jr. is a 79 y.o. male admitted with a medical diagnosis of fallx2 resulting in right displaced intertrochanteric femur fracture s/p IMN. Hx of rheumatoid arthritis.  He presents with the following impairments/functional limitations: weakness, impaired functional mobility, impaired endurance, gait instability, impaired balance, decreased lower extremity function, orthopedic precautions.  At baseline, Pt able to ambulate ~20-30 ft with knee brace and cane, however typically performs stand pivot transfer to rollator; SBA/CGA for all. Sits on rollator and self propels with BLE; however, this is how he fell on both attempts when attempting to stand from sitting position of rollator. Spouse will be bringing pts R knee brace for session 4/12.     Rehab Prognosis: Good; patient would benefit from acute skilled PT services to address these deficits and reach maximum level of function.    Recent Surgery: Procedure(s) (LRB):  INSERTION, INTRAMEDULLARY ROSE MARY, FEMUR (Right) Day of Surgery    Plan:     During this hospitalization, patient to be seen 6 x/week to address the identified rehab impairments via gait training, therapeutic activities, therapeutic exercises and progress toward the following goals:    Plan of Care Expires:  05/11/24    Subjective     Chief Complaint: pain  Patient/Family Comments/goals: to return to PLOF  Pain/Comfort:  Pain Rating 1:  (generalized pain globally)    Patients cultural, spiritual, Hinduism conflicts given the current situation:      Living Environment:  Pt lives with spouse in a mobile home with ramp entry. 2 falls from rollator resulting in fx.   Prior to  admission, patients level of function was Alie for transfers to rollator, could ambulate ~20-30 ft x 2 with cane and R knee brace.  Equipment used at home: rollator, cane, straight (transport chair).  DME owned (not currently used): none.  Upon discharge, patient will have assistance from spouse.    Objective:     Communicated with RN prior to session.  Patient found HOB elevated with blood pressure cuff, peripheral IV, pulse ox (continuous)  upon PT entry to room.    General Precautions: Standard, fall  Orthopedic Precautions:RLE weight bearing as tolerated (ROMAT)   Braces: N/A  Respiratory Status: Room air    Exams:  RLE Strength: N/T due to recent sx, unable to perform LAQ  LLE Strength: Deficits: hip flexion 2/2 pain in RLE  Skin integrity:  bloody drainage noted to post-op bandage       Functional Mobility:  Bed Mobility:     Supine to Sit: moderate assistance  Sit to Supine: moderate assistance  Transfers:     Sit to Stand:  moderate assistance with rolling walker  Pre-Gait: side steps towards HOB x 3' with RW MaxA to facilitate weight shift and limb advancement. Increased unsteadiness during single limb WB on RLE.       AM-PAC 6 CLICK MOBILITY  Total Score:10       Treatment & Education:    Patient and family were provided with verbal education education regarding PT role/goals/POC, post-op precautions, fall prevention, and safety awareness.  Understanding was verbalized.     Patient left HOB elevated with all lines intact, call button in reach, RN notified, and family present.    GOALS:   Multidisciplinary Problems       Physical Therapy Goals          Problem: Physical Therapy    Goal Priority Disciplines Outcome Goal Variances Interventions   Physical Therapy Goal     PT, PT/OT Ongoing, Progressing     Description: Goals to be met by: d/c     Patient will increase functional independence with mobility by performin. Supine to sit with Stand-by Assistance  2. Sit to supine with Stand-by  Assistance  3. Sit to stand transfer with Stand-by Assistance  4. Bed to chair transfer with Stand-by Assistance using Rolling Walker  5. Gait  x 30 feet with Stand-by Assistance using Standard Walker.                          History:     History reviewed. No pertinent past medical history.    History reviewed. No pertinent surgical history.    Time Tracking:     PT Received On: 04/11/24  PT Start Time: 1344     PT Stop Time: 1400  PT Total Time (min): 16 min     Billable Minutes: Evaluation 16      04/11/2024

## 2024-04-11 NOTE — ANESTHESIA PROCEDURE NOTES
Intubation    Date/Time: 4/11/2024 8:42 AM    Performed by: Fitz Gomez CRNA  Authorized by: Lorenzo Gunderson MD    Intubation:     Induction:  Intravenous    Intubated:  Postinduction    Mask Ventilation:  Easy with oral airway    Attempts:  1    Attempted By:  CRNA    Method of Intubation:  Video laryngoscopy    Blade:  Julio 3    Laryngeal View Grade: Grade I - full view of cords      Difficult Airway Encountered?: No      Complications:  None    Airway Device:  Oral endotracheal tube    Airway Device Size:  7.5    Style/Cuff Inflation:  Cuffed (inflated to minimal occlusive pressure)    Inflation Amount (mL):  8    Tube secured:  23    Secured at:  The lips (w/ tape)    Placement Verified By:  Capnometry    Complicating Factors:  None    Findings Post-Intubation:  BS equal bilateral and atraumatic/condition of teeth unchanged

## 2024-04-11 NOTE — BRIEF OP NOTE
[facility] - Periop Services  Brief Operative Note    SUMMARY     Surgery Date: 4/11/2024     Surgeon(s) and Role:     * Stu Tatum MD - Primary    Assisting Surgeon: None    Pre-op Diagnosis:  Right intertroch femur fx [S72.141A]    Post-op Diagnosis:  same    Procedure(s) (LRB):  INSERTION, INTRAMEDULLARY ROSE MARY, FEMUR (Right)- 96250    Anesthesia: General    Implants:  Implant Name Type Inv. Item Serial No.  Lot No. LRB No. Used Action   NAIL TFNA 12MM 130DEG 170MM ST - [id number]  NAIL TFNA 12MM 130DEG 170MM ST  [employer] [id number] Right 1 Implanted   SCREW FEM NECK PERF GOLD 110MM - [id number]  SCREW FEM NECK PERF GOLD 110MM  [employer] [id number] Right 1 Implanted   SCREW BONE LOCK IM NAIL 34X5MM - [id number]  SCREW BONE LOCK IM NAIL 34X5MM  [employer] [id number] Right 1 Implanted       Operative Findings: see op report    Estimated Blood Loss: 100 mL    Estimated Blood Loss has been documented.         Specimens:   Specimen (24h ago, onward)      None            [id number]  A/P: Tolerated procedure well. Admit to floor. WBAT to RLE. Full ROM. Lovenox for DVT ppx while in house then ECASA 81mg po BID.     This note/OR report was created with the assistance of  voice recognition software or phone  dictation.  There may be transcription errors as a result of using this technology however minimal. Effort has been made to assure accuracy of transcription but any obvious errors or omissions should be clarified with the author of the document.         Stu Tatum MD  Orthopedic Trauma  [facility]

## 2024-04-11 NOTE — ED PROVIDER NOTES
Encounter Date: 4/10/2024    SCRIBE #1 NOTE: I, Trent Bañuelos, am scribing for, and in the presence of,  Julián Cruz MD. I have scribed the following portions of the note - Other sections scribed: HPI,ROS,PE.       History     Chief Complaint   Patient presents with    Fall     Presents with c/o right hip deformity. Right leg shortened and rotated. +2 pedal pulses. Denies hitting head.      78 y/o male with PMHx of HTN presents to ED c/o fall onset today. Pt reports he stood up, tried to turn, and fell on his right hip. He complains of right hip pain. He also reports having chronic neck pain, but denies increased neck pain. He denies head trauma.  Patient has a history of rheumatoid arthritis in his on small dose of steroids daily per wife.    Primary care physician is Dr. Lopez    The history is provided by the patient. No  was used.     Review of patient's allergies indicates:  Not on File  No past medical history on file.  No past surgical history on file.  No family history on file.     Review of Systems   Constitutional:  Negative for activity change, chills, diaphoresis, fatigue and fever.   HENT:  Negative for congestion, ear pain, sinus pain and sore throat.    Eyes:  Negative for visual disturbance.   Respiratory:  Negative for cough, shortness of breath, wheezing and stridor.    Cardiovascular:  Negative for chest pain, palpitations and leg swelling.   Gastrointestinal:  Negative for abdominal pain, constipation, diarrhea, nausea, rectal pain and vomiting.   Genitourinary:  Negative for dysuria and hematuria.   Musculoskeletal:  Positive for arthralgias (right hip pain), myalgias (right hip pain) and neck pain (chronic neck pain but denies increased pain). Negative for back pain.   Skin:  Negative for rash.   Neurological:  Negative for dizziness, syncope, weakness, numbness and headaches.   All other systems reviewed and are negative.      Physical Exam     Initial Vitals  [04/10/24 2031]   BP Pulse Resp Temp SpO2   (!) 201/90 63 18 97.7 °F (36.5 °C) 97 %      MAP       --         Physical Exam    Nursing note and vitals reviewed.  Constitutional: No distress.   HENT:   Head: Normocephalic and atraumatic.   Eyes: EOM are normal.   Neck: Trachea normal. Neck supple.   Normal range of motion.  Cardiovascular:  Normal rate and regular rhythm.           No murmur heard.  Palpable DP pulse to the right.    Pulmonary/Chest: Breath sounds normal. No respiratory distress.   Abdominal: Abdomen is soft. Bowel sounds are normal. He exhibits no distension. There is no abdominal tenderness. There is no rebound and no guarding.   Musculoskeletal:      Cervical back: Normal range of motion and neck supple.      Lumbar back: Normal.      Comments: Right lower extremity shortened and externally rotated.   Pain to the lateral aspect of right hip.      Neurological: He is alert and oriented to person, place, and time. He has normal strength.   Able to wiggle all digits to RLE.   Sensation intact to RLE.    Skin: Skin is warm and dry. No rash noted.   Psychiatric: He has a normal mood and affect.         ED Course   Procedures  Labs Reviewed   COMPREHENSIVE METABOLIC PANEL - Abnormal; Notable for the following components:       Result Value    Glucose Level 165 (*)     Albumin Level 3.3 (*)     Globulin 3.9 (*)     Albumin/Globulin Ratio 0.8 (*)     All other components within normal limits   CBC WITH DIFFERENTIAL - Abnormal; Notable for the following components:    WBC 19.10 (*)     RBC 4.62 (*)     Neut # 16.83 (*)     IG# 0.12 (*)     All other components within normal limits   PROTIME-INR - Normal   APTT - Normal   CBC W/ AUTO DIFFERENTIAL    Narrative:     The following orders were created for panel order CBC auto differential.  Procedure                               Abnormality         Status                     ---------                               -----------         ------                      CBC with Differential[[id number]]        Abnormal            Final result                 Please view results for these tests on the individual orders.   TYPE & SCREEN   ABORH RETYPE     EKG Readings: (Independently Interpreted)   Initial Reading: No STEMI. Rhythm: Normal Sinus Rhythm. Heart Rate: 85. Ectopy: No Ectopy. Conduction: Normal. ST Segments: Normal ST Segments. T Waves: Normal. Axis: Normal.       Imaging Results              X-Ray Chest 1 View (Final result)  Result time 04/10/24 22:09:41      Final result by Orion Pettit MD (04/10/24 22:09:41)                   Impression:      NO ACUTE CARDIOPULMONARY PROCESS IDENTIFIED.      Electronically signed by: Orion Pettit  Date:    04/10/2024  Time:    22:09               Narrative:    EXAMINATION:  XR CHEST 1 VIEW    CLINICAL HISTORY:  Unspecified fall, initial encounter    TECHNIQUE:  One view    COMPARISON:  June 14, 2021.    FINDINGS:  Cardiopericardial silhouette is within normal limits.  No acute dense focal or segmental consolidation, congestive process, pleural effusions or pneumothorax.                                       X-Ray Hip 2 or 3 views Right (with Pelvis when performed) (Final result)  Result time 04/10/24 22:10:36      Final result by Orion Pettit MD (04/10/24 22:10:36)                   Impression:      Right intertrochanteric fracture.      Electronically signed by: Orion Pettit  Date:    04/10/2024  Time:    22:10               Narrative:    EXAMINATION:  XR HIP WITH PELVIS WHEN PERFORMED, 2 OR 3  VIEWS RIGHT    CLINICAL HISTORY:  Hip pain    COMPARISON:  June 14, 2021..    FINDINGS:  There is right hip intertrochanteric fracture.  Femoral head is situated within the acetabulum.  Demineralization of bones.  Extensive vascular calcified plaques.                                       Medications   morphine injection 4 mg (has no administration in time range)   HYDROcodone-acetaminophen 5-325 mg per tablet 1 tablet (has no  administration in time range)   ondansetron injection 4 mg (has no administration in time range)   methocarbamoL tablet 500 mg (has no administration in time range)   hydrALAZINE injection 10 mg (has no administration in time range)   morphine injection 4 mg (4 mg Intravenous Given 4/10/24 2141)   ondansetron injection 4 mg (4 mg Intravenous Given 4/10/24 2142)     Medical Decision Making  Differential diagnosis: Hip fracture, hip contusion, pelvic fracture    Amount and/or Complexity of Data Reviewed  Labs: ordered. Decision-making details documented in ED Course.     Details: Abnormal labs include white blood cell count 88408, glucose of 165  Radiology: ordered. Decision-making details documented in ED Course.  ECG/medicine tests: ordered. Decision-making details documented in ED Course.  Discussion of management or test interpretation with external provider(s): Patient has a right-sided hip fracture.  Morphine was given for pain while in the ER along with Zofran.  Got pain relief from morphine.  Patient will be admitted to the hospitalist team.    Risk  Prescription drug management.  Decision regarding hospitalization.            Scribe Attestation:   Scribe #1: I performed the above scribed service and the documentation accurately describes the services I performed. I attest to the accuracy of the note.    Attending Attestation:           Physician Attestation for Scribe:  Physician Attestation Statement for Scribe #1: I, Julián Cruz MD, reviewed documentation, as scribed by Trent Bañuelos in my presence, and it is both accurate and complete.             ED Course as of 04/10/24 2237   Wed Apr 10, 2024   2207 Orthopedics was consulted.  Dr. Barrientos looked at x-rays, recommends admit to primary care physician. [KG]   2207 Florencia LOVE, OK to admit [KG]      ED Course User Index  [KG] Julián Cruz MD                           Clinical Impression:  Final diagnoses:  [W19.XXXA] Fall  [Z01.818] Preop  testing  [S72.001A] Closed fracture of right hip, initial encounter (Primary)          ED Disposition Condition    Admit Stable                Julián Cruz MD  04/10/24 4790       Julián Cruz MD  04/10/24 0596

## 2024-04-11 NOTE — PROGRESS NOTES
Patient Name: Terence Tanner Jr.  MRN: [id number]  Admission Date: 4/10/2024  Hospital Length of Stay: 1 days  Attending Provider: Roland Lincoln MD  Primary Care Provider: Whitney, Primary Doctor  Follow-up For: Procedure(s) (LRB):  INSERTION, INTRAMEDULLARY ROSE MARY, FEMUR (Right)    Post-Operative Day: Day of Surgery  Subjective:       Principal Orthopedic Problem: Day of Surgery       Interval History:  No acute issues reported overnight.  Patient resting comfortably this morning.  Ready for operative stabilization of his right inner troch fracture today.  I have agreed to assume care of his injury from my partner due to my earlier operative availability    Review of patient's allergies indicates:  No Known Allergies    Current Facility-Administered Medications   Medication    acetaminophen tablet 650 mg    aluminum-magnesium hydroxide-simethicone 200-200-20 mg/5 mL suspension 30 mL    cloNIDine tablet 0.2 mg    enoxaparin injection 40 mg    heparin, porcine (PF) 100 unit/mL injection flush 500 Units    hydrALAZINE injection 10 mg    HYDROcodone-acetaminophen 5-325 mg per tablet 1 tablet    labetaloL injection 10 mg    lactated ringers infusion    melatonin tablet 6 mg    methocarbamoL tablet 500 mg    morphine injection 4 mg    ondansetron injection 4 mg    polyethylene glycol packet 17 g    prochlorperazine injection Soln 5 mg    senna-docusate 8.6-50 mg per tablet 2 tablet    sodium chloride 0.9% flush 10 mL     Current Outpatient Medications   Medication Sig    methylPREDNISolone (MEDROL) 4 MG Tab Take 4 mg by mouth once daily.     Objective:     Vital Signs (Most Recent):  Temp: 98.4 °F (36.9 °C) (04/10/24 2034)  Pulse: (!) 126 (04/11/24 0750)  Resp: 18 (04/11/24 0708)  BP: (!) 218/132 (04/11/24 0750)  SpO2: 97 % (04/11/24 0750) Vital Signs (24h Range):  Temp:  [97.7 °F (36.5 °C)-98.4 °F (36.9 °C)] 98.4 °F (36.9 °C)  Pulse:  [] 126  Resp:  [14-20] 18  SpO2:  [95 %-98 %] 97 %  BP: (164-218)/() 218/132  "    Weight: 70.3 kg (155 lb)  Height: 5' 9" (175.3 cm)  Body mass index is 22.89 kg/m².    No intake or output data in the 24 hours ending 04/11/24 0802    Physical Exam:   Ortho/SPM Exam    General the patient is alert and oriented x3 no acute distress nontoxic-appearing appropriate affect.    Constitutional: Vital signs are examined and stable.  Resp: No signs of labored breathing    Musculoskeletal Exam:  Right lower extremity.  He is pain with any attempted movement of the right lower extremity.  Limb is sitting externally rotated.  Thigh soft and compressible.  Distally he is able to perform dorsiflexion and plantar flexion of the ankle and digits with a palpable DP pulse.    Diagnostic Findings:   Significant Labs: BMP:   Recent Labs   Lab 04/11/24 0447      K 4.1   CO2 25   BUN 17.3   CREATININE 0.90   CALCIUM 9.2     CBC:   Recent Labs   Lab 04/10/24  2102 04/11/24  0447   WBC 19.10* 9.96   HGB 14.1 12.4*   HCT 42.1 37.4*    196     CMP:   Recent Labs   Lab 04/10/24  2102 04/11/24  0447    141   K 3.7 4.1   CO2 23 25   BUN 18.1 17.3   CREATININE 0.99 0.90   CALCIUM 9.5 9.2   ALBUMIN 3.3*  --    BILITOT 0.4  --    ALKPHOS 44  --    AST 14  --    ALT 13  --      All pertinent labs within the past 24 hours have been reviewed.        Significant Imaging: I have reviewed all pertinent imaging results/findings.     Assessment/Plan:     Active Diagnoses:    Diagnosis Date Noted POA    PRINCIPAL PROBLEM:  Closed fracture of right hip [S72.001A] 04/10/2024 Yes      Problems Resolved During this Admission:     H/H is down slightly as expected due to acute fracture.  Vital signs stable.  Ready for surgery today.The risks, benefits and alternatives treatment were discussed at length with the patient today including but not limited to pain, bleeding, scarring, infection, damage to neurovascular structures, malunion/nonunion, hardware failure/irritation, need for future procedures and complications " leading to amputation and even death.  He understands and agrees with all that we have discussed and all questions and concerns were addressed.      This note/OR report was created with the assistance of  voice recognition software or phone  dictation.  There may be transcription errors as a result of using this technology however minimal. Effort has been made to assure accuracy of transcription but any obvious errors or omissions should be clarified with the author of the document.           Stu Tatum MD  Orthopedic Trauma Surgery  [facility] - Emergency Dept

## 2024-04-11 NOTE — CONSULTS
[facility] - Emergency Dept  Orthopedics  Consult Note    Patient Name: Terence Tanner Jr.  MRN: [id number]  Admission Date: 4/10/2024  Hospital Length of Stay: 0 days  Attending Provider: Veto Moore MD  Primary Care Provider: Whitney Primary Doctor    Patient information was obtained from ER records.     Consults  Subjective:  Right hip pain     Principal Problem:<principal problem not specified>    Chief Complaint:   Chief Complaint   Patient presents with    Fall     Presents with c/o right hip deformity. Right leg shortened and rotated. +2 pedal pulses. Denies hitting head.         HPI:  Patient is a 79-year-old male who was at home earlier today, lost balance and fell in his right hip.  He does have a history of falls.  He does have a history of daily steroid use secondary to rheumatoid arthritis.  Family at bedside.  Upon workup in the ER patient was noted to have a right displaced of fracture.  Orthopedics has been consulted.    No past medical history on file.    No past surgical history on file.    Review of patient's allergies indicates:  Not on File    No current facility-administered medications for this encounter.     No current outpatient medications on file.     Family History    None       Tobacco Use    Smoking status: Not on file    Smokeless tobacco: Not on file   Substance and Sexual Activity    Alcohol use: Not on file    Drug use: Not on file    Sexual activity: Not on file     ROS  Objective:  Patient is well-nourished developed male he is awake alert and oriented he has pain in the right hip which is improving.  He is receiving pain medications.  Patient denies any pain to the shoulder elbow wrist and hand, he is neurovascular intact distally bilaterally.  Examination of the right lower extremity it is shortened and externally rotated.  He is appropriately tender about the right hip.  He is also tender at his knee, chronic deformity significant arthritic changes, there is  "no obvious swelling bruising, he is able to flex extend his toes sensation is intact though decreased, brisk capillary refill.  Examination of the left lower extremity he is nontender about the hip, he does have a tenderness about his left knee as well, there was no bruising or swelling, he is neurovascular intact distally.     Vital Signs (Most Recent):  Temp: 98.4 °F (36.9 °C) (04/10/24 2034)  Pulse: 82 (04/10/24 2208)  Resp: 18 (04/10/24 2141)  BP: (!) 199/97 (04/10/24 2208)  SpO2: 95 % (04/10/24 2208) Vital Signs (24h Range):  Temp:  [97.7 °F (36.5 °C)-98.4 °F (36.9 °C)] 98.4 °F (36.9 °C)  Pulse:  [63-82] 82  Resp:  [18] 18  SpO2:  [95 %-97 %] 95 %  BP: (199-201)/(90-97) 199/97     Weight: 70.3 kg (155 lb)  Height: 5' 9" (175.3 cm)  Body mass index is 22.89 kg/m².    No intake or output data in the 24 hours ending 04/10/24 2219    Ortho/SPM Exam    Significant Labs: All pertinent labs within the past 24 hours have been reviewed.    Significant Imaging: I have reviewed and interpreted all pertinent imaging results/findings.    Assessment/Plan:  79-year-old male status post same-level fall with a right displaced intertrochanteric hip fracture.  Plan 1. Patient seen examined chart reviewed in the ER.  2.  Patient remain in the position of comfort to the right lower extremity.  We will continue to watch his neurovascular compartment status closely.  3.  Patient is currently be admitted to the Medicine Service, we have discussed surgical clearance as well.  4.  We will obtain full length x-rays of his right femur as well as x-rays of his bilateral knees.  5. We have discussed surgical intervention, upon medical clearance, we will plan for the operating room for his right hip.       There are no hospital problems to display for this patient.      Thank you for your consult. I will follow-up with patient. Please contact us if you have any additional questions.    Ankit Barrientos MD  Orthopedics  [facility] " - Emergency Dept

## 2024-04-11 NOTE — H&P
[facility] Hospital Medicine - H&P Note    Patient Name: Terence Tanner Jr.  MRN: [id number]  PCP: Whitney, Primary Doctor  Admitting Physician: Roland Lincoln MD  Admission Class: IP- Inpatient   Date of Service: 04/10/2024  Code status: Full    Chief Complaint   Ground level fall, right hip pain    History of Present Illness   This is 79-year-old male with medical history of chronic rheumatoid arthritis, osteoarthritis with deformities on chronic methylprednisolone 4 mg daily present to the ED after a ground level fall with complaint of right hip pain.  Report he has significant arthritis and bowing at his knees and while trying to stand up he slipped and fell landing on his right side he immediately started having right hip pain and deformed hip.  Denies hitting his head or losing consciousness      On arrival to ED he was afebrile and hypertensive.  Labs notable for WBC 19.10, CMP unremarkable, urinalysis unremarkable.  Chest x-ray show no airspace disease, right hip x-ray showed right intertrochanteric femur fracture.    Orthopedic surgery consulted and planned for operative management tomorrow.  Referred to hospital medicine service for further evaluation and management.    He denies any history of heart disease or prior interventions, lung disease, or prior reaction to anesthesia.    ROS   Except as documented, all other systems reviewed and negative     Past Medical History   Rheumatoid arthritis on chronic prednisone  Osteoarthritis    Past Surgical History   Reviewed and noncontributory    Social History   No current smoking, alcohol or illicit drug use    Family History   Reviewed and negative    Allergies   Patient has no allergy information on record.    Home Medications     Prior to Admission medications    Medication Sig Start Date End Date Taking? Authorizing Provider   methylPREDNISolone (MEDROL) 4 MG Tab Take 4 mg by mouth once daily. 3/18/24  Yes Provider, Historical  "      Physical Exam   Vital Signs  Temp:  [97.7 °F (36.5 °C)-98.4 °F (36.9 °C)]   Pulse:  [63-82]   Resp:  [18]   BP: (199-201)/(90-97)   SpO2:  [95 %-97 %]    General: Appears comfortable  HEENT: NC/AT  Neck:  No JVD  Chest: CTABL  CVS: Regular rhythm. Normal S1/S2.  Abdomen: nondistended, normoactive BS, soft and non-tender.  MSK:  Rheumatoid arthritis deformities to bilateral hands and feet, right leg shortened and externally rotated, tenderness to anterior groin and right lateral hip, sensation intact  Skin: Warm and dry  Neuro: AAOx3, no focal neurological deficit  Psych: Cooperative    Labs     Recent Labs     04/10/24  2102   WBC 19.10*   RBC 4.62*   HGB 14.1   HCT 42.1   MCV 91.1   MCH 30.5   MCHC 33.5   RDW 12.7        Recent Labs     04/10/24  2130   PROTIME 14.0   INR 1.1   PTT 28.9      Recent Labs     04/10/24  2102      K 3.7   CHLORIDE 106   CO2 23   BUN 18.1   CREATININE 0.99   EGFRNORACEVR >60   GLUCOSE 165*   CALCIUM 9.5   ALBUMIN 3.3*   GLOBULIN 3.9*   ALKPHOS 44   ALT 13   AST 14   BILITOT 0.4     No results for input(s): "LACTIC" in the last 72 hours.  No results for input(s): "CPK", "TROPONINI" in the last 72 hours.     Microbiology Results (last 7 days)       ** No results found for the last 168 hours. **           Imaging     X-Ray Chest 1 View   Final Result      NO ACUTE CARDIOPULMONARY PROCESS IDENTIFIED.         Electronically signed by: Orion Pettit   Date:    04/10/2024   Time:    22:09      X-Ray Hip 2 or 3 views Right (with Pelvis when performed)   Final Result      Right intertrochanteric fracture.         Electronically signed by: Orion Pettit   Date:    04/10/2024   Time:    22:10      X-Ray Femur 2 View Right    (Results Pending)   X-Ray Knee Complete 4 or More Views Left    (Results Pending)   X-Ray Knee 1 or 2 View Right    (Results Pending)     Assessment   Closed right intertrochanteric femur fracture  Ground level fall  Hypertension-probably secondary to " pain  Reactive leukocytosis/stress response      History osteoarthritis and rheumatoid arthritis on chronic methylprednisolone      Plan   NPO after midnight  Multimodal pain control, bowel regimen  Resumed methylprednisolone 4 mg daily  Orthopedic surgery following, OR in a.m.  VTE Prophylaxis:  Enoxaparin 40 mg subQ daily    Roland Lincoln MD  Internal Medicine

## 2024-04-11 NOTE — PT/OT/SLP EVAL
"Occupational Therapy  Evaluation    Name: Terence Tanner Jr.  MRN: [id number]    Recommendations:     Discharge therapy intensity: Moderate Intensity Therapy   Discharge Equipment Recommendations:   (TBD)  Barriers to discharge:   (ongoing medical needs, severity of deficits)    Assessment:     Terence Tanner Jr. is a 79 y.o. male with a medical diagnosis of fallx2 resulting in right displaced intertrochanteric femur fracture s/p IMN. Hx of rheumatoid arthritis.  He presents with the following performance deficits affecting function: weakness, impaired endurance, impaired self care skills, impaired functional mobility, gait instability, impaired balance, decreased lower extremity function, decreased upper extremity function, pain.  At baseline, Pt able to ambulate ~20-30 ft with knee brace and cane, however typically performs stand pivot transfer to rollator; SBA/CGA for all. SBA-Ind with all self care tasks. During eval, Pt requiring increased assist  when compared to PLOF, increasing his fall risk. Will currently benefit from moderate intensity therapy upon d/c.    Rehab Prognosis: Good; patient would benefit from acute skilled OT services to address these deficits and reach maximum level of function.       Plan:     Patient to be seen 5 x/week to address the above listed problems via self-care/home management, therapeutic activities, therapeutic exercises  Plan of Care Expires: 05/11/24  Plan of Care Reviewed with: patient, family    Subjective     Chief Complaint: "all over pain"  Patient/Family Comments/goals: to go to SNF    Occupational Profile:  Living Environment: lives with wife in mobile home, ramp to enter; walk in shower  Previous level of function: SBA-Ind with all self care tasks and functional mobility  Roles and Routines: , brother in law  Equipment Used at Home: rollator, shower chair, cane, quad, wheelchair (transport chair, knee brace)  Assistance upon Discharge: wife however she reports she " could not currently assist Pt 2/2 his increased need    Pain/Comfort:  Pain Rating 1: 0/10    Patients cultural, spiritual, Temple conflicts given the current situation: no    Objective:     OT communicated with NSG prior to session.      Patient was found HOB elevated with blood pressure cuff, peripheral IV, pulse ox (continuous) upon OT entry to room.    General Precautions: Standard, fall  Orthopedic Precautions: RLE weight bearing as tolerated (ROMAT)  Braces: N/A  Room air  Vital Signs: WNL throughout    Bed Mobility:    Patient completed Supine to Sit with moderate assistance  Patient completed Sit to Supine with moderate assistance  Sitting EOB with SBA    Functional Mobility/Transfers:  Patient completed Sit <> Stand Transfer with moderate assistance  with  rolling walker   Functional Mobility: side steps towards HOB (left), max A to weightshift and advance LEs    Functional Cognition:  Orientation: oriented to Person, Place, Time, and Situation    Visual Perceptual Skills:  Intact    Upper Extremity Function:  Bilateral UEs:  WFL except RA affecting hand mobility/dexterity/coordination      Therapeutic Positioning  Risk for acquired pressure injuries is increased due to relative decrease in mobility d/t hospitalization  and impaired mobility.    OT interventions performed during the course of today's session:   Education was provided on benefits of and recommendations for therapeutic positioning  Therapeutic positioning was provided at the conclusion of session to offload all bony prominences for the prevention and/or reduction of pressure injuries  PUP ordered    Skin assessment: all bony prominences were assessed    Findings: known area of altered skin integrity at right hip surgical site; bleeding, NSG alerted    OT recommendations for therapeutic positioning throughout hospitalization:   Follow [facility] Pressure Injury Prevention Protocol  Geomat recommended for sacral protection while UIC    Patient  Education:  Patient and family provided with verbal education education regarding OT role/goals/POC, post op precautions, fall prevention, safety awareness, Discharge/DME recommendations, and pressure ulcer prevention.  Understanding was verbalized.     Patient left HOB elevated with all lines intact, call button in reach, NSG notified, and family present.    GOALS:   Multidisciplinary Problems       Occupational Therapy Goals          Problem: Occupational Therapy    Goal Priority Disciplines Outcome Interventions   Occupational Therapy Goal     OT, PT/OT Ongoing, Progressing    Description: Goals to be met 5/11/224    Pt will complete grooming seated at sink with LRAD with SBA.  Pt will complete UB dressing with SBA.  Pt will complete LB dressing with SBA using LRAD.  Pt will complete toileting with SBA using LRAD.  Pt will complete bedside commode transfer with SBA using LRAD.                         History:     History reviewed. No pertinent past medical history.    History reviewed. No pertinent surgical history.    Time Tracking:     OT Date of Treatment:    OT Start Time: 1344  OT Stop Time: 1400  OT Total Time (min): 16 min    Billable Minutes:Evaluation mod    4/11/2024

## 2024-04-11 NOTE — FIRST PROVIDER EVALUATION
"Medical screening examination initiated.  I have conducted a focused provider triage encounter, findings are as follows:    Brief history of present illness:  arrived to ED due to fall x2. Reports he fell onto his R hip, his wife attempted to get him in a chair, and he fell again. -LOC or head injury.    Vitals:    04/10/24 2031 04/10/24 2034   BP: (!) 201/90    Pulse: 63    Resp: 18    Temp: 97.7 °F (36.5 °C) 98.4 °F (36.9 °C)   SpO2: 97%    Weight:  70.3 kg (155 lb)   Height:  5' 9" (1.753 m)       Pertinent physical exam:  awake, alert, on stretcher, RLE shortened and rotated.     Brief workup plan:  imaging and labs     Preliminary workup initiated; this workup will be continued and followed by the physician or advanced practice provider that is assigned to the patient when roomed.  "

## 2024-04-12 LAB
ALBUMIN SERPL-MCNC: 2.7 G/DL (ref 3.4–4.8)
ALBUMIN/GLOB SERPL: 1 RATIO (ref 1.1–2)
ALP SERPL-CCNC: 34 UNIT/L (ref 40–150)
ALT SERPL-CCNC: 9 UNIT/L (ref 0–55)
AST SERPL-CCNC: 17 UNIT/L (ref 5–34)
BASOPHILS # BLD AUTO: 0.03 X10(3)/MCL
BASOPHILS NFR BLD AUTO: 0.2 %
BILIRUB SERPL-MCNC: 0.4 MG/DL
BUN SERPL-MCNC: 15.7 MG/DL (ref 8.4–25.7)
CALCIUM SERPL-MCNC: 8.6 MG/DL (ref 8.8–10)
CHLORIDE SERPL-SCNC: 103 MMOL/L (ref 98–107)
CO2 SERPL-SCNC: 27 MMOL/L (ref 23–31)
CREAT SERPL-MCNC: 0.82 MG/DL (ref 0.73–1.18)
DEPRECATED CALCIDIOL+CALCIFEROL SERPL-MC: 14.3 NG/ML (ref 30–80)
EOSINOPHIL # BLD AUTO: 0 X10(3)/MCL (ref 0–0.9)
EOSINOPHIL NFR BLD AUTO: 0 %
ERYTHROCYTE [DISTWIDTH] IN BLOOD BY AUTOMATED COUNT: 12.9 % (ref 11.5–17)
GFR SERPLBLD CREATININE-BSD FMLA CKD-EPI: >60 MLS/MIN/1.73/M2
GLOBULIN SER-MCNC: 2.8 GM/DL (ref 2.4–3.5)
GLUCOSE SERPL-MCNC: 107 MG/DL (ref 82–115)
HCT VFR BLD AUTO: 31.4 % (ref 42–52)
HGB BLD-MCNC: 10.4 G/DL (ref 14–18)
IMM GRANULOCYTES # BLD AUTO: 0.07 X10(3)/MCL (ref 0–0.04)
IMM GRANULOCYTES NFR BLD AUTO: 0.5 %
LYMPHOCYTES # BLD AUTO: 1.6 X10(3)/MCL (ref 0.6–4.6)
LYMPHOCYTES NFR BLD AUTO: 12 %
MCH RBC QN AUTO: 30 PG (ref 27–31)
MCHC RBC AUTO-ENTMCNC: 33.1 G/DL (ref 33–36)
MCV RBC AUTO: 90.5 FL (ref 80–94)
MONOCYTES # BLD AUTO: 1.14 X10(3)/MCL (ref 0.1–1.3)
MONOCYTES NFR BLD AUTO: 8.5 %
NEUTROPHILS # BLD AUTO: 10.5 X10(3)/MCL (ref 2.1–9.2)
NEUTROPHILS NFR BLD AUTO: 78.8 %
NRBC BLD AUTO-RTO: 0 %
PLATELET # BLD AUTO: 189 X10(3)/MCL (ref 130–400)
PMV BLD AUTO: 10.2 FL (ref 7.4–10.4)
POTASSIUM SERPL-SCNC: 3.6 MMOL/L (ref 3.5–5.1)
PROT SERPL-MCNC: 5.5 GM/DL (ref 5.8–7.6)
RBC # BLD AUTO: 3.47 X10(6)/MCL (ref 4.7–6.1)
SODIUM SERPL-SCNC: 140 MMOL/L (ref 136–145)
WBC # SPEC AUTO: 13.34 X10(3)/MCL (ref 4.5–11.5)

## 2024-04-12 PROCEDURE — 97535 SELF CARE MNGMENT TRAINING: CPT | Mod: CO

## 2024-04-12 PROCEDURE — 25000003 PHARM REV CODE 250: Performed by: INTERNAL MEDICINE

## 2024-04-12 PROCEDURE — 97530 THERAPEUTIC ACTIVITIES: CPT | Mod: CO

## 2024-04-12 PROCEDURE — 63600175 PHARM REV CODE 636 W HCPCS

## 2024-04-12 PROCEDURE — 11000001 HC ACUTE MED/SURG PRIVATE ROOM

## 2024-04-12 PROCEDURE — 82306 VITAMIN D 25 HYDROXY: CPT | Performed by: INTERNAL MEDICINE

## 2024-04-12 PROCEDURE — 85025 COMPLETE CBC W/AUTO DIFF WBC: CPT | Performed by: INTERNAL MEDICINE

## 2024-04-12 PROCEDURE — 80053 COMPREHEN METABOLIC PANEL: CPT | Performed by: INTERNAL MEDICINE

## 2024-04-12 PROCEDURE — 97530 THERAPEUTIC ACTIVITIES: CPT | Mod: CQ

## 2024-04-12 PROCEDURE — 63600175 PHARM REV CODE 636 W HCPCS: Performed by: INTERNAL MEDICINE

## 2024-04-12 RX ORDER — AMOXICILLIN 250 MG
2 CAPSULE ORAL 2 TIMES DAILY
Status: DISCONTINUED | OUTPATIENT
Start: 2024-04-12 | End: 2024-04-17 | Stop reason: HOSPADM

## 2024-04-12 RX ADMIN — MORPHINE SULFATE 4 MG: 4 INJECTION, SOLUTION INTRAMUSCULAR; INTRAVENOUS at 08:04

## 2024-04-12 RX ADMIN — Medication 1 TABLET: at 08:04

## 2024-04-12 RX ADMIN — HYDROCODONE BITARTRATE AND ACETAMINOPHEN 1 TABLET: 5; 325 TABLET ORAL at 08:04

## 2024-04-12 RX ADMIN — CEFAZOLIN SODIUM 2 G: 2 SOLUTION INTRAVENOUS at 01:04

## 2024-04-12 RX ADMIN — SENNOSIDES AND DOCUSATE SODIUM 2 TABLET: 8.6; 5 TABLET ORAL at 08:04

## 2024-04-12 RX ADMIN — LISINOPRIL 10 MG: 10 TABLET ORAL at 08:04

## 2024-04-12 RX ADMIN — PROCHLORPERAZINE EDISYLATE 5 MG: 5 INJECTION INTRAMUSCULAR; INTRAVENOUS at 01:04

## 2024-04-12 RX ADMIN — ENOXAPARIN SODIUM 40 MG: 40 INJECTION SUBCUTANEOUS at 06:04

## 2024-04-12 RX ADMIN — MORPHINE SULFATE 4 MG: 4 INJECTION, SOLUTION INTRAMUSCULAR; INTRAVENOUS at 02:04

## 2024-04-12 RX ADMIN — METHYLPREDNISOLONE 4 MG: 4 TABLET ORAL at 08:04

## 2024-04-12 RX ADMIN — CEFAZOLIN SODIUM 2 G: 2 SOLUTION INTRAVENOUS at 09:04

## 2024-04-12 RX ADMIN — HYDROCODONE BITARTRATE AND ACETAMINOPHEN 1 TABLET: 5; 325 TABLET ORAL at 01:04

## 2024-04-12 RX ADMIN — METHOCARBAMOL 500 MG: 500 TABLET ORAL at 08:04

## 2024-04-12 NOTE — PROGRESS NOTES
[facility] MEDICINE ~ PROGRESS NOTE        CHIEF COMPLAINT   Hospital follow up    HOSPITAL COURSE   79-year-old male with medical history of chronic rheumatoid arthritis, osteoarthritis with deformities on chronic methylprednisolone 4 mg daily present to the ED after a ground level fall with complaint of right hip pain.  Report he has significant arthritis and bowing at his knees and while trying to stand up he slipped and fell landing on his right side he immediately started having right hip pain and deformed hip.  Denies hitting his head or losing consciousness      On arrival to ED he was afebrile and hypertensive.  Labs notable for WBC 19.10, CMP unremarkable, urinalysis unremarkable.  Chest x-ray show no airspace disease, right hip x-ray showed right intertrochanteric femur fracture.     Orthopedic surgery consulted and planned for operative management tomorrow.  Referred to hospital medicine service for further evaluation and management.     He denies any history of heart disease or prior interventions, lung disease, or prior reaction to anesthesia.    Today  Seen this morning and pain is better but still having some postoperative pain now.  Therapy recommending SNF.        OBJECTIVE/PHYSICAL EXAM     VITAL SIGNS (MOST RECENT):  Temp: 98.1 °F (36.7 °C) (04/12/24 0729)  Pulse: 101 (04/12/24 0729)  Resp: 18 (04/12/24 0729)  BP: (!) 140/70 (04/12/24 0729)  SpO2: (!) 92 % (04/12/24 0729) VITAL SIGNS (24 HOUR RANGE):  Temp:  [97.1 °F (36.2 °C)-98.1 °F (36.7 °C)] 98.1 °F (36.7 °C)  Pulse:  [] 101  Resp:  [10-22] 18  SpO2:  [84 %-99 %] 92 %  BP: (114-218)/() 140/70   GENERAL: In no acute distress, afebrile  HEENT:  CHEST: Clear to auscultation bilaterally  HEART: S1, S2, no appreciable murmur  ABDOMEN: Soft, nontender, BS +  MSK: Warm, no lower extremity edema, no clubbing or cyanosis  NEUROLOGIC: Alert and oriented x4, moving all extremities with good strength    INTEGUMENTARY:  PSYCHIATRY:        ASSESSMENT/PLAN   Closed right intertrochanteric femur fracture  Ground level fall  Hypertension-probably secondary to pain  Reactive leukocytosis/stress response      History osteoarthritis and rheumatoid arthritis on chronic methylprednisolone      Orthopedic surgery following.  Aspirin 81 b.i.d. on discharge.  Blood pressure improving, continue lisinopril 10  Continue methylprednisolone 4 mg daily per home dose.  calcium and vitamin-D tablet for chronic steroids.    PT and OT  Case management consulted for dispo planning    DVT prophylaxis:  Lovenox 40    Anticipated discharge and disposition:   __________________________________________________________________________    NUTRITIONAL STATUS     Patient meets ASPEN criteria for   malnutrition of   per RD assessment as evidenced by:                       A minimum of two characteristics is recommended for diagnosis of either severe or non-severe malnutrition.     LABS/MICRO/MEDS/DIAGNOSTICS       LABS  Recent Labs     04/12/24  0616      K 3.6   CHLORIDE 103   CO2 27   BUN 15.7   CREATININE 0.82   GLUCOSE 107   CALCIUM 8.6*   ALKPHOS 34*   AST 17   ALT 9   ALBUMIN 2.7*       Recent Labs     04/12/24  0616   WBC 13.34*   RBC 3.47*   HCT 31.4*   MCV 90.5            MICROBIOLOGY  Microbiology Results (last 7 days)       ** No results found for the last 168 hours. **               MEDICATIONS   calcium-vitamin D3  1 tablet Oral BID    ceFAZolin (Ancef) IV (PEDS and ADULTS)  2 g Intravenous Q8H    enoxparin  40 mg Subcutaneous Daily    lisinopriL  10 mg Oral Daily    methylPREDNISolone  4 mg Oral Daily         INFUSIONS           DIAGNOSTIC TESTS  X-Ray Femur 2 View Right   Final Result      Improved alignment following internal fixation of the right femoral neck.         Electronically signed by: Mahi Crocker   Date:    04/11/2024   Time:    11:47      SURG FL Surgery Fluoro Usage   Final Result      X-Ray Knee  "Complete 4 or More Views Left   Final Result      Tricompartmental degenerative changes.      Suprapatellar effusion.         Electronically signed by: Rachid Otero   Date:    04/11/2024   Time:    08:58      X-Ray Knee 1 or 2 View Right   Final Result      Tricompartmental degenerative changes.         Electronically signed by: Rachid Otero   Date:    04/11/2024   Time:    08:56      X-Ray Chest 1 View   Final Result      NO ACUTE CARDIOPULMONARY PROCESS IDENTIFIED.         Electronically signed by: Orion Pettit   Date:    04/10/2024   Time:    22:09      X-Ray Hip 2 or 3 views Right (with Pelvis when performed)   Final Result      Right intertrochanteric fracture.         Electronically signed by: Orion Pettit   Date:    04/10/2024   Time:    22:10           No results found for: "EF"         Case related differential diagnoses have been reviewed; assessment and plan has been documented. I have personally reviewed the labs and test results that are currently available; I have reviewed the patients medication list. I have reviewed the consulting providers recommendations. I have reviewed or attempted to review medical records based upon their availability.  All of the patient's and/or family's questions have been addressed and answered to the best of my ability.  I will continue to monitor closely and make adjustments to medical management as needed.  This document was created using M*Modal Fluency Direct.  Transcription errors may have been made.  Please contact me if any questions may rise regarding documentation to clarify transcription.        Derrek Rose MD   Internal Medicine  Department of Hospital Medicine [facility] - Periop Services               "

## 2024-04-12 NOTE — PROGRESS NOTES
Patient Name: Terence Tanner Jr.  MRN: [id number]  Admission Date: 4/10/2024  Hospital Length of Stay: 2 days  Attending Provider: Roland Lincoln MD  Primary Care Provider: Whitney, Primary Doctor  Follow-up For: Procedure(s) (LRB):  INSERTION, INTRAMEDULLARY ROSE MARY, FEMUR (Right)    Post-Operative Day: Day of Surgery  Subjective:       Principal Orthopedic Problem: 1 Day Post-Op       Interval History:    No acute issues reported overnight.  Patient resting comfortably this morning.  Family member at bedside.  Reports his pain is well controlled.  He does admit to knee pain that he was told he has to wear a brace for if he stands.  No other complaints at this time.  Pt appears well.     Review of patient's allergies indicates:  No Known Allergies    Current Facility-Administered Medications   Medication    acetaminophen tablet 650 mg    aluminum-magnesium hydroxide-simethicone 200-200-20 mg/5 mL suspension 30 mL    calcium-vitamin D3 500 mg-5 mcg (200 unit) per tablet 1 tablet    cefazolin (ANCEF) 2 gram in dextrose 5% 50 mL IVPB (premix)    cloNIDine tablet 0.2 mg    enoxaparin injection 40 mg    heparin, porcine (PF) 100 unit/mL injection flush 500 Units    hydrALAZINE injection 10 mg    HYDROcodone-acetaminophen 5-325 mg per tablet 1 tablet    labetaloL injection 10 mg    lisinopriL tablet 10 mg    melatonin tablet 6 mg    methocarbamoL tablet 500 mg    methylPREDNISolone tablet 4 mg    morphine injection 4 mg    ondansetron injection 4 mg    polyethylene glycol packet 17 g    prochlorperazine injection Soln 5 mg    senna-docusate 8.6-50 mg per tablet 2 tablet    sodium chloride 0.9% flush 10 mL     Objective:     Vital Signs (Most Recent):  Temp: 98 °F (36.7 °C) (04/12/24 0324)  Pulse: 81 (04/12/24 0324)  Resp: 17 (04/12/24 0324)  BP: 139/73 (04/12/24 0324)  SpO2: (!) 93 % (04/12/24 0324) Vital Signs (24h Range):  Temp:  [97.1 °F (36.2 °C)-98.1 °F (36.7 °C)] 98 °F (36.7 °C)  Pulse:  [] 81  Resp:  [10-22] 17  SpO2:   "[84 %-99 %] 93 %  BP: (114-218)/() 139/73     Weight: 70.3 kg (155 lb)  Height: 5' 9" (175.3 cm)  Body mass index is 22.89 kg/m².      Intake/Output Summary (Last 24 hours) at 4/12/2024 0721  Last data filed at 4/11/2024 2108  Gross per 24 hour   Intake 1350 ml   Output 350 ml   Net 1000 ml       Physical Exam:   Ortho/SPM Exam    General the patient is alert and oriented x3 no acute distress nontoxic-appearing appropriate affect.    Constitutional: Vital signs are examined and stable.  Resp: No signs of labored breathing    Musculoskeletal Exam:  Right lower extremity.  PROM of hip without firm ends or pain.  Pt with pain at knee.  Compartments soft and compressible.  Distally he is able to perform dorsiflexion and plantar flexion of the ankle and digits with a palpable DP pulse.  Light sensation intact.     Diagnostic Findings:   Significant Labs: BMP:   Recent Labs   Lab 04/12/24  0616      K 3.6   CO2 27   BUN 15.7   CREATININE 0.82   CALCIUM 8.6*       CBC:   Recent Labs   Lab 04/10/24  2102 04/11/24  0447 04/12/24  0616   WBC 19.10* 9.96 13.34*   HGB 14.1 12.4* 10.4*   HCT 42.1 37.4* 31.4*    196 189       CMP:   Recent Labs   Lab 04/10/24  2102 04/11/24  0447 04/12/24  0616    141 140   K 3.7 4.1 3.6   CO2 23 25 27   BUN 18.1 17.3 15.7   CREATININE 0.99 0.90 0.82   CALCIUM 9.5 9.2 8.6*   ALBUMIN 3.3*  --  2.7*   BILITOT 0.4  --  0.4   ALKPHOS 44  --  34*   AST 14  --  17   ALT 13  --  9       All pertinent labs within the past 24 hours have been reviewed.        Significant Imaging: I have reviewed all pertinent imaging results/findings.     Assessment/Plan:     Active Diagnoses:    Diagnosis Date Noted POA    PRINCIPAL PROBLEM:  Closed fracture of right hip [S72.001A] 04/10/2024 Yes      Problems Resolved During this Admission:     -ABLA noted which is expected after injury and intervention.  Vital signs stable.    -DVT ppx: Lovenox while in house, ASA 81 mg Bid upon d/c.   -Ancef " for 3 doses s/p procedure for surgical ppx  -WBAT to RLE, full ROM  -CM for d/c planning  -Cont PT/OT  -post op XR on 4/11 AP pelvis and R hip 2 view show intact hardware to hip.      America Schaeffer PA-C  Orthopedic Trauma Surgery  [facility] - Emergency Dept

## 2024-04-12 NOTE — PT/OT/SLP PROGRESS
Physical Therapy Treatment    Patient Name:  Terence Tanner Jr.   MRN:  [id number]    Recommendations:     Discharge therapy intensity: Moderate Intensity Therapy   Discharge Equipment Recommendations: to be determined by next level of care  Barriers to discharge: Impaired mobility    Assessment:     Terence Tanner Jr. is a 79 y.o. male admitted with a medical diagnosis of fallx2 resulting in right displaced intertrochanteric femur fracture s/p IMN. Hx of rheumatoid arthritis.  He presents with the following impairments/functional limitations: weakness, impaired functional mobility, impaired endurance, gait instability, impaired balance, decreased lower extremity function, orthopedic precautions .    Patient session limited by pain.    Rehab Prognosis: Good; patient would benefit from acute skilled PT services to address these deficits and reach maximum level of function.    Recent Surgery: Procedure(s) (LRB):  INSERTION, INTRAMEDULLARY ROSE MARY, FEMUR (Right) 1 Day Post-Op    Plan:     During this hospitalization, patient to be seen 6 x/week to address the identified rehab impairments via gait training, therapeutic activities, therapeutic exercises and progress toward the following goals:    Plan of Care Expires:  05/11/24    Subjective     Chief Complaint: right hip pain  Patient/Family Comments/goals: none stated  Pain/Comfort:  Pain Rating 1: other (see comments) (did not rate)  Location - Side 1: Right  Location - Orientation 1: generalized  Location 1: hip  Pain Addressed 1: Reposition, Cessation of Activity      Objective:     Communicated with nurse prior to session.  Patient found up in chair with pulse ox (continuous), peripheral IV, telemetry upon PT entry to room.     General Precautions: Standard, fall  Orthopedic Precautions: RLE weight bearing as tolerated  Braces: N/A  Respiratory Status: Room air  Blood Pressure: NT  Skin Integrity: Visible skin intact      Functional Mobility:  Bed Mobility:     Rolling  Right: stand by assistance  Sit to Supine: moderate assistance  Transfers:     Sit to Stand:  moderate assistance with rolling walker  Chair to Bed: moderate assistance with  rolling walker  using  Step Transfer    Education:  Patient provided with verbal education education regarding PT role/goals/POC, post-op precautions, fall prevention, and safety awareness.  Understanding was verbalized.     Patient left HOB elevated with all lines intact and call button in reach    GOALS:   Multidisciplinary Problems       Physical Therapy Goals          Problem: Physical Therapy    Goal Priority Disciplines Outcome Goal Variances Interventions   Physical Therapy Goal     PT, PT/OT Ongoing, Progressing     Description: Goals to be met by: d/c     Patient will increase functional independence with mobility by performin. Supine to sit with Stand-by Assistance  2. Sit to supine with Stand-by Assistance  3. Sit to stand transfer with Stand-by Assistance  4. Bed to chair transfer with Stand-by Assistance using Rolling Walker  5. Gait  x 30 feet with Stand-by Assistance using Standard Walker.                          Time Tracking:     PT Received On: 24  PT Start Time: 1329     PT Stop Time: 1343  PT Total Time (min): 14 min     Billable Minutes: Therapeutic Activity 14    Treatment Type: Treatment  PT/PTA: PTA     Number of PTA visits since last PT visit: 2024

## 2024-04-12 NOTE — CARE UPDATE
[id number] Rec consult for SNF placement. FOC obtained. Referral sent to YANIV Stoddard and Betty Crowder in [facility] thru care port. Notified Fitz CABALLERO.

## 2024-04-12 NOTE — PROGRESS NOTES
Inpatient Nutrition Evaluation    Admit Date: 4/10/2024   Total duration of encounter: 2 days    Nutrition Recommendation/Prescription     - continue regular diet as tolerated      Nutrition Assessment     Chart Review    Reason Seen: continuous nutrition monitoring    Malnutrition Screening Tool Results   Have you recently lost weight without trying?: No  Have you been eating poorly because of a decreased appetite?: No   MST Score: 0     Diagnosis:  Closed right intertrochanteric femur fracture  Ground level fall  Hypertension-probably secondary to pain  Reactive leukocytosis/stress response    Relevant Medical History: chronic rheumatoid arthritis, osteoarthritis with deformities     Nutrition-Related Medications: Scheduled Medications:  calcium-vitamin D3, 1 tablet, BID  enoxparin, 40 mg, Daily  lisinopriL, 10 mg, Daily  methylPREDNISolone, 4 mg, Daily  senna-docusate 8.6-50 mg, 2 tablet, BID    Continuous Infusions:   PRN Medications: acetaminophen, aluminum-magnesium hydroxide-simethicone, cloNIDine, heparin, porcine (PF), hydrALAZINE, HYDROcodone-acetaminophen, labetalol, melatonin, methocarbamoL, morphine, ondansetron, polyethylene glycol, prochlorperazine, senna-docusate 8.6-50 mg, sodium chloride 0.9%     Nutrition-Related Labs:  Recent Labs   Lab 04/10/24  2102 04/11/24  0447 04/12/24  0616    141 140   K 3.7 4.1 3.6   CALCIUM 9.5 9.2 8.6*   CHLORIDE 106 106 103   CO2 23 25 27   BUN 18.1 17.3 15.7   CREATININE 0.99 0.90 0.82   EGFRNORACEVR >60 >60 >60   GLUCOSE 165* 150* 107   BILITOT 0.4  --  0.4   ALKPHOS 44  --  34*   ALT 13  --  9   AST 14  --  17   ALBUMIN 3.3*  --  2.7*   WBC 19.10* 9.96 13.34*   HGB 14.1 12.4* 10.4*   HCT 42.1 37.4* 31.4*        Diet Order: Diet Adult Regular  Oral Supplement Order: none  Appetite/Oral Intake: good/50-75% of meals  Factors Affecting Nutritional Intake: none identified  Food/Rastafarian/Cultural Preferences: none reported  Food Allergies: no known food  "allergies    Skin Integrity: incision  Wound(s):       Comments    4/12/24 pt reports good appetite, ate well for breakfast, wife brought plate lunch for lunchtime which pt ate about half of    Anthropometrics    Height: 5' 9" (175.3 cm) Height Method: Stated  Last Weight: 70.3 kg (155 lb) (04/12/24 1324) Weight Method: Bed Scale  BMI (Calculated): 22.9  BMI Classification: normal (BMI 18.5-24.9)        Ideal Body Weight (IBW), Male: 160 lb     % Ideal Body Weight, Male (lb): 96.88 %                          Usual Weight Provided By: patient denies unintentional weight loss    Wt Readings from Last 5 Encounters:   04/12/24 70.3 kg (155 lb)     Weight Change(s) Since Admission:  Admit Weight: 70.3 kg (155 lb) (04/10/24 2034)      Patient Education    Not applicable.    Monitoring & Evaluation     Dietitian will monitor food and beverage intake.  Nutrition Risk/Follow-Up: low (follow-up in 5-7 days)  Patients assigned 'low nutrition risk' status do not qualify for a full nutritional assessment but will be monitored and re-evaluated in a 5-7 day time period. Please consult if re-evaluation needed sooner.   "

## 2024-04-12 NOTE — PT/OT/SLP PROGRESS
Occupational Therapy   Treatment    Name: Terecne Tanner Jr.  MRN: [id number]  Admitting Diagnosis:  Closed fracture of right hip  1 Day Post-Op    Recommendations:     Recommended therapy intensity at discharge: Moderate Intensity Therapy   Discharge Equipment Recommendations:   (TBD)  Barriers to discharge:       Assessment:     Terence Tanner Jr. is a 79 y.o. male with a medical diagnosis of Closed fracture of right hip. Performance deficits affecting function are weakness, impaired endurance, impaired self care skills, impaired functional mobility, decreased lower extremity function, gait instability, impaired balance, decreased safety awareness, pain.     Rehab Prognosis:  Good; patient would benefit from acute skilled OT services to address these deficits and reach maximum level of function.       Plan:     Patient to be seen 5 x/week to address the above listed problems via self-care/home management, therapeutic activities, therapeutic exercises  Plan of Care Expires: 05/11/24  Plan of Care Reviewed with: patient    Subjective     Pain/Comfort:  Location - Side 1: Right  Location 1: hip  Pain Addressed 1: Reposition, Distraction    Objective:     Communicated with: RN prior to session.  Patient found supine with peripheral IV, pulse ox (continuous), telemetry upon OT entry to room.    General Precautions: Standard, fall    Orthopedic Precautions:RLE weight bearing as tolerated  Braces: N/A  Respiratory Status: Room air     Occupational Performance:     Bed Mobility:    Patient completed Scooting/Bridging with contact guard assistance  Patient completed Supine to Sit with minimum assistance     Functional Mobility/Transfers:  Patient completed Sit <> Stand Transfer with moderate assistance  with  rolling walker  x2 trials from EOB.   Functional Mobility: pt took lateral steps along EOB with Min-Mod A and RW. Also performed stand step t/f from bed<>chair with Min-Mod A. Fatigues quickly.     Activities of Daily  Living:  LE dressing: donned R knee brace from long sitting position with SBA.     Therapeutic Positioning    OT interventions performed during the course of today's session in an effort to prevent and/or reduce acquired pressure injuries:   Education was provided on benefits of and recommendations for therapeutic positioning    [facility] 6 Click ADL:      Patient Education:  Patient provided with verbal education education regarding OT role/goals/POC and safety awareness.  Understanding was verbalized.      Patient left up in chair with all lines intact, call button in reach, and spouse present.    GOALS:   Multidisciplinary Problems       Occupational Therapy Goals          Problem: Occupational Therapy    Goal Priority Disciplines Outcome Interventions   Occupational Therapy Goal     OT, PT/OT Ongoing, Progressing    Description: Goals to be met 5/11/224    Pt will complete grooming seated at sink with LRAD with SBA.  Pt will complete UB dressing with SBA.  Pt will complete LB dressing with SBA using LRAD.  Pt will complete toileting with SBA using LRAD.  Pt will complete bedside commode transfer with SBA using LRAD.                         Time Tracking:     OT Date of Treatment: 04/12/24  OT Start Time: 0922  OT Stop Time: 0945  OT Total Time (min): 23 min    Billable Minutes:Self Care/Home Management 10  Therapeutic Activity 13    OT/AMINA: AMINA     Number of AMINA visits since last OT visit: 1    4/12/2024

## 2024-04-13 PROCEDURE — 63600175 PHARM REV CODE 636 W HCPCS: Performed by: INTERNAL MEDICINE

## 2024-04-13 PROCEDURE — 25000003 PHARM REV CODE 250: Performed by: INTERNAL MEDICINE

## 2024-04-13 PROCEDURE — 25000003 PHARM REV CODE 250: Performed by: PHYSICIAN ASSISTANT

## 2024-04-13 PROCEDURE — 11000001 HC ACUTE MED/SURG PRIVATE ROOM

## 2024-04-13 PROCEDURE — 97530 THERAPEUTIC ACTIVITIES: CPT | Mod: CQ

## 2024-04-13 RX ORDER — METHOCARBAMOL 500 MG/1
500 TABLET, FILM COATED ORAL 3 TIMES DAILY
Status: DISCONTINUED | OUTPATIENT
Start: 2024-04-13 | End: 2024-04-17 | Stop reason: HOSPADM

## 2024-04-13 RX ORDER — LISINOPRIL 20 MG/1
20 TABLET ORAL DAILY
Status: DISCONTINUED | OUTPATIENT
Start: 2024-04-13 | End: 2024-04-15

## 2024-04-13 RX ORDER — OXYCODONE HYDROCHLORIDE 10 MG/1
10 TABLET ORAL EVERY 4 HOURS PRN
Status: DISCONTINUED | OUTPATIENT
Start: 2024-04-13 | End: 2024-04-17 | Stop reason: HOSPADM

## 2024-04-13 RX ORDER — OXYCODONE HYDROCHLORIDE 5 MG/1
5 TABLET ORAL EVERY 6 HOURS PRN
Status: DISCONTINUED | OUTPATIENT
Start: 2024-04-13 | End: 2024-04-16

## 2024-04-13 RX ORDER — LACTULOSE 10 G/15ML
20 SOLUTION ORAL ONCE
Status: COMPLETED | OUTPATIENT
Start: 2024-04-13 | End: 2024-04-13

## 2024-04-13 RX ADMIN — METHOCARBAMOL 500 MG: 500 TABLET ORAL at 08:04

## 2024-04-13 RX ADMIN — METHOCARBAMOL 500 MG: 500 TABLET ORAL at 02:04

## 2024-04-13 RX ADMIN — Medication 1 TABLET: at 08:04

## 2024-04-13 RX ADMIN — OXYCODONE HYDROCHLORIDE 10 MG: 10 TABLET ORAL at 08:04

## 2024-04-13 RX ADMIN — SENNOSIDES AND DOCUSATE SODIUM 2 TABLET: 8.6; 5 TABLET ORAL at 08:04

## 2024-04-13 RX ADMIN — METHYLPREDNISOLONE 4 MG: 4 TABLET ORAL at 08:04

## 2024-04-13 RX ADMIN — LACTULOSE 20 G: 10 SOLUTION ORAL at 08:04

## 2024-04-13 RX ADMIN — Medication 6 MG: at 08:04

## 2024-04-13 RX ADMIN — OXYCODONE HYDROCHLORIDE 5 MG: 5 TABLET ORAL at 02:04

## 2024-04-13 RX ADMIN — ONDANSETRON 4 MG: 2 INJECTION INTRAMUSCULAR; INTRAVENOUS at 10:04

## 2024-04-13 RX ADMIN — HYDROCODONE BITARTRATE AND ACETAMINOPHEN 1 TABLET: 5; 325 TABLET ORAL at 04:04

## 2024-04-13 RX ADMIN — LISINOPRIL 20 MG: 20 TABLET ORAL at 08:04

## 2024-04-13 RX ADMIN — CLONIDINE HYDROCHLORIDE 0.2 MG: 0.2 TABLET ORAL at 04:04

## 2024-04-13 RX ADMIN — ENOXAPARIN SODIUM 40 MG: 40 INJECTION SUBCUTANEOUS at 04:04

## 2024-04-13 RX ADMIN — POLYETHYLENE GLYCOL 3350 17 G: 17 POWDER, FOR SOLUTION ORAL at 08:04

## 2024-04-13 NOTE — PROGRESS NOTES
Patient Name: Terence Tanner Jr.  MRN: [id number]  Admission Date: 4/10/2024  Hospital Length of Stay: 3 days  Attending Provider: Derrek Rose MD  Primary Care Provider: [facility] Primary Doctor  Follow-up For: Procedure(s) (LRB):  INSERTION, INTRAMEDULLARY ROSE MARY, FEMUR (Right)    Post-Operative Day: POD2   Subjective:       Principal Orthopedic Problem: 2 Days Post-Op       Interval History:    Patient resting comfortably this morning. Family is at bedside. No acute complaints. Pain is not controlled with norco only, will trial alternative today. Has not been able to work with physical therapy. No numbness or tingling distally.     Review of patient's allergies indicates:  No Known Allergies    Current Facility-Administered Medications   Medication Dose Route Frequency Provider Last Rate Last Admin    acetaminophen tablet 650 mg  650 mg Oral Q4H PRN Roland Lincoln MD        aluminum-magnesium hydroxide-simethicone 200-200-20 mg/5 mL suspension 30 mL  30 mL Oral QID PRN Roland Lincoln MD        calcium-vitamin D3 500 mg-5 mcg (200 unit) per tablet 1 tablet  1 tablet Oral BID Derrek Rose MD   1 tablet at 04/12/24 2039    cloNIDine tablet 0.2 mg  0.2 mg Oral TID PRN Roland Lincoln MD        enoxaparin injection 40 mg  40 mg Subcutaneous Daily Roland Lincoln MD   40 mg at 04/12/24 1843    heparin, porcine (PF) 100 unit/mL injection flush 500 Units  5 mL Intravenous On Call Procedure Roland Lincoln MD        hydrALAZINE injection 10 mg  10 mg Intravenous Q4H PRN Roland Lincoln MD        HYDROcodone-acetaminophen 5-325 mg per tablet 1 tablet  1 tablet Oral Q6H PRN Roland Lincoln MD   1 tablet at 04/13/24 0423    labetaloL injection 10 mg  10 mg Intravenous Q4H PRN Roland Lincoln MD   10 mg at 04/11/24 1142    lactulose 10 gram/15 ml solution 20 g  20 g Oral Once Derrek Rose MD        lisinopriL tablet 20 mg  20 mg Oral Daily Derrek Rose MD        melatonin tablet 6 mg  6 mg Oral Nightly PRN Roland Lincoln MD   6 mg at 04/11/24 2048  "   methocarbamoL tablet 500 mg  500 mg Oral TID Derrek Rose MD        methylPREDNISolone tablet 4 mg  4 mg Oral Daily Roland Lincoln MD   4 mg at 04/12/24 0850    ondansetron injection 4 mg  4 mg Intravenous Q4H PRN Roland Lincoln MD   4 mg at 04/11/24 2105    polyethylene glycol packet 17 g  17 g Oral BID PRN Roland Lincoln MD        prochlorperazine injection Soln 5 mg  5 mg Intravenous Q6H PRN Roland Lincoln MD   5 mg at 04/12/24 0114    senna-docusate 8.6-50 mg per tablet 2 tablet  2 tablet Oral BID Derrek Rose MD   2 tablet at 04/12/24 0850    sodium chloride 0.9% flush 10 mL  10 mL Intravenous PRN Roland Lincoln MD         Objective:     Vital Signs (Most Recent):  Temp: 98.3 °F (36.8 °C) (04/13/24 0736)  Pulse: 102 (04/13/24 0736)  Resp: 20 (04/13/24 0736)  BP: (!) 148/74 (04/13/24 0736)  SpO2: 96 % (04/13/24 0736) Vital Signs (24h Range):  Temp:  [97.7 °F (36.5 °C)-99 °F (37.2 °C)] 98.3 °F (36.8 °C)  Pulse:  [] 102  Resp:  [16-20] 20  SpO2:  [94 %-96 %] 96 %  BP: (132-179)/(63-83) 148/74     Weight: 70.3 kg (155 lb)  Height: 5' 9" (175.3 cm)  Body mass index is 22.89 kg/m².      Intake/Output Summary (Last 24 hours) at 4/13/2024 0835  Last data filed at 4/13/2024 0300  Gross per 24 hour   Intake 480 ml   Output 1275 ml   Net -795 ml       Physical Exam:   Ortho/SPM Exam  General the patient is alert and oriented x3 no acute distress nontoxic-appearing appropriate affect.    Constitutional: Vital signs are examined and stable.  Resp: No signs of labored breathing                        RLE: -Skin: Dressing removed and replace, incisions clean and dry without bleeding, moderate ecchymosis about the hip as expected.  No signs of new abrasions or lacerations, no scars           -MSK: : Hip and Knee F/E, EHL/FHL, Gastroc/Tib anterior Strength 5/5; Tolerates full passive range of motion of the hip           -Neuro:  Sensation intact to light touch L3-S1 dermatomes           -Lymphatic: No signs of " lymphadenopathy           -CV: Capillary refill is less than 2 seconds. DP pulse 2+ Compartments soft and compressible.        Diagnostic Findings:   Significant Labs: BMP:   Recent Labs   Lab 04/12/24  0616      K 3.6   CO2 27   BUN 15.7   CREATININE 0.82   CALCIUM 8.6*     CBC:   Recent Labs   Lab 04/12/24  0616   WBC 13.34*   HGB 10.4*   HCT 31.4*        CMP:   Recent Labs   Lab 04/12/24  0616      K 3.6   CO2 27   BUN 15.7   CREATININE 0.82   CALCIUM 8.6*   ALBUMIN 2.7*   BILITOT 0.4   ALKPHOS 34*   AST 17   ALT 9     All pertinent labs within the past 24 hours have been reviewed.        Significant Imaging: I have reviewed all pertinent imaging results/findings.     Assessment/Plan:     Active Diagnoses:    Diagnosis Date Noted POA    PRINCIPAL PROBLEM:  Closed fracture of right hip [S72.001A] 04/10/2024 Yes      Problems Resolved During this Admission:     -H&H down slightly but stable. Consistent with ABLA following injury. Vital signs stable.    -DVT ppx: Lovenox while in house, ASA 81 mg Bid upon d/c.   -WBAT to RLE, full ROM, work with therapy on mobilzing.   - Daily dry dressing changes beginning today.  -CM for d/c planning  - Follow up with Dr. Tatum in approx 2 weeks for wound check and repeat x rays.   -Change norco to oxycodone for pain control today. Will continue to monitor. Please call with questions or concerns.     The above findings, diagnostics, and treatment plan were discussed with Dr. Baldwin who is in agreement with the plan of care except as stated in additional documentation.       [facility]   Orthopedic Trauma

## 2024-04-13 NOTE — PT/OT/SLP PROGRESS
Physical Therapy Treatment    Patient Name:  Terence Tanner Jr.   MRN:  [id number]    Recommendations:     Discharge therapy intensity: Moderate Intensity Therapy   Discharge Equipment Recommendations: to be determined by next level of care  Barriers to discharge: Impaired mobility    Assessment:     Terence Tanner Jr. is a 79 y.o. male admitted with a medical diagnosis of fallx2 resulting in right displaced intertrochanteric femur fracture s/p IMN. Hx of rheumatoid arthritis.  He presents with the following impairments/functional limitations: weakness, impaired functional mobility, impaired endurance, gait instability, impaired balance, decreased lower extremity function, orthopedic precautions .    Patient session limited by pain. Patient politely declined sitting up in chair today due to pain and fear of needing to have BM when in chair due to meds. Nursing made aware.    Rehab Prognosis: Good; patient would benefit from acute skilled PT services to address these deficits and reach maximum level of function.    Recent Surgery: Procedure(s) (LRB):  INSERTION, INTRAMEDULLARY ROSE MARY, FEMUR (Right) 2 Days Post-Op    Plan:     During this hospitalization, patient to be seen 6 x/week to address the identified rehab impairments via gait training, therapeutic activities, therapeutic exercises and progress toward the following goals:    Plan of Care Expires:  05/11/24    Subjective     Chief Complaint: right hip and knee pain  Patient/Family Comments/goals: none stated  Pain/Comfort:  Pain Rating 1: other (see comments) (pt did not quantify)      Objective:     Communicated with nurse prior to session.  Patient found up in chair with pulse ox (continuous), peripheral IV, telemetry upon PT entry to room.     General Precautions: Standard, fall  Orthopedic Precautions: RLE weight bearing as tolerated  Braces: N/A  Respiratory Status: Room air  Blood Pressure: NT  Skin Integrity: Visible skin intact      Functional Mobility:  Bed  Mobility:     Rolling Right: stand by assistance  Sit to Supine: moderate assistance  Transfers:     Sit to Stand:  moderate assistance x2 with rolling walker x5 trials from bed    Treatment  Patient performed standing marching with rolling walker with modA with decreased foot clearance on bilateral lower extremity.     Education:  Patient provided with verbal education education regarding PT role/goals/POC, post-op precautions, fall prevention, and safety awareness.  Understanding was verbalized.     Patient left HOB elevated with all lines intact and call button in reach    GOALS:   Multidisciplinary Problems       Physical Therapy Goals          Problem: Physical Therapy    Goal Priority Disciplines Outcome Goal Variances Interventions   Physical Therapy Goal     PT, PT/OT Ongoing, Progressing     Description: Goals to be met by: d/c     Patient will increase functional independence with mobility by performin. Supine to sit with Stand-by Assistance  2. Sit to supine with Stand-by Assistance  3. Sit to stand transfer with Stand-by Assistance  4. Bed to chair transfer with Stand-by Assistance using Rolling Walker  5. Gait  x 30 feet with Stand-by Assistance using Standard Walker.                          Time Tracking:     PT Received On: 24  PT Start Time: 0845     PT Stop Time: 0908  PT Total Time (min): 23 min     Billable Minutes: Therapeutic Activity 23    Treatment Type: Treatment  PT/PTA: PTA     Number of PTA visits since last PT visit: 2     2024

## 2024-04-13 NOTE — PT/OT/SLP PROGRESS
Occupational Therapy      Patient Name:  Terence Tanner Jr.   MRN:  [id number]    [id number], Patient not seen today secondary to complaints of pain, nausea, dizziness, and constipation. Patient reports 5/10 pain in (R) hip and recently received laxative, per wife. Patient does not want to make toileting attempt due to aforementioned complaints . Will follow-up as schedule allows.    4/13/2024

## 2024-04-13 NOTE — PLAN OF CARE
Problem: Adult Inpatient Plan of Care  Goal: Plan of Care Review  4/13/2024 1843 by Miranda Gao RN  Outcome: Ongoing, Progressing  4/13/2024 1615 by Miranda Gao RN  Outcome: Ongoing, Progressing  Goal: Patient-Specific Goal (Individualized)  4/13/2024 1843 by Miranda Gao RN  Outcome: Ongoing, Progressing  4/13/2024 1615 by Miranda Gao RN  Outcome: Ongoing, Progressing  Goal: Absence of Hospital-Acquired Illness or Injury  4/13/2024 1843 by Miranda Gao RN  Outcome: Ongoing, Progressing  4/13/2024 1615 by Miranda Gao RN  Outcome: Ongoing, Progressing  Goal: Optimal Comfort and Wellbeing  4/13/2024 1843 by Miranda Gao RN  Outcome: Ongoing, Progressing  4/13/2024 1615 by Miranda Gao RN  Outcome: Ongoing, Progressing  Goal: Readiness for Transition of Care  4/13/2024 1843 by Miranda Gao RN  Outcome: Ongoing, Progressing  4/13/2024 1615 by Miranda Gao RN  Outcome: Ongoing, Progressing     Problem: Fall Injury Risk  Goal: Absence of Fall and Fall-Related Injury  4/13/2024 1843 by Miranda Gao RN  Outcome: Ongoing, Progressing  4/13/2024 1615 by Miranda Gao RN  Outcome: Ongoing, Progressing     Problem: Skin Injury Risk Increased  Goal: Skin Health and Integrity  4/13/2024 1843 by Miranda Gao RN  Outcome: Ongoing, Progressing  4/13/2024 1615 by Miranda Gao RN  Outcome: Ongoing, Progressing

## 2024-04-13 NOTE — PROGRESS NOTES
[facility] MEDICINE ~ PROGRESS NOTE        CHIEF COMPLAINT   Hospital follow up    HOSPITAL COURSE   79-year-old male with medical history of chronic rheumatoid arthritis, osteoarthritis with deformities on chronic methylprednisolone 4 mg daily present to the ED after a ground level fall with complaint of right hip pain.  Report he has significant arthritis and bowing at his knees and while trying to stand up he slipped and fell landing on his right side he immediately started having right hip pain and deformed hip.  Denies hitting his head or losing consciousness      On arrival to ED he was afebrile and hypertensive.  Labs notable for WBC 19.10, CMP unremarkable, urinalysis unremarkable.  Chest x-ray show no airspace disease, right hip x-ray showed right intertrochanteric femur fracture.     Orthopedic surgery consulted and planned for operative management tomorrow.  Referred to hospital medicine service for further evaluation and management.     He denies any history of heart disease or prior interventions, lung disease, or prior reaction to anesthesia.    Today  His ABD pad was stained with blood.  No bowel movement yet.        OBJECTIVE/PHYSICAL EXAM     VITAL SIGNS (MOST RECENT):  Temp: 98.3 °F (36.8 °C) (04/13/24 0736)  Pulse: 102 (04/13/24 0736)  Resp: 20 (04/13/24 0736)  BP: (!) 148/74 (04/13/24 0736)  SpO2: 96 % (04/13/24 0736) VITAL SIGNS (24 HOUR RANGE):  Temp:  [97.7 °F (36.5 °C)-99 °F (37.2 °C)] 98.3 °F (36.8 °C)  Pulse:  [] 102  Resp:  [16-20] 20  SpO2:  [94 %-96 %] 96 %  BP: (132-179)/(63-83) 148/74   GENERAL: In no acute distress, afebrile  HEENT:  CHEST: Clear to auscultation bilaterally  HEART: S1, S2, no appreciable murmur  ABDOMEN: Soft, nontender, BS +  MSK: Warm, no lower extremity edema, no clubbing or cyanosis  NEUROLOGIC: Alert and oriented x4, moving all extremities with good strength   INTEGUMENTARY:  PSYCHIATRY:        ASSESSMENT/PLAN    Closed right intertrochanteric femur fracture  Ground level fall  Hypertension-probably secondary to pain  Reactive leukocytosis/stress response      History osteoarthritis and rheumatoid arthritis on chronic methylprednisolone      Orthopedic surgery following.  Aspirin 81 b.i.d. on discharge.  Blood pressure improving, increase lisinopril to 20 mg  Continue methylprednisolone 4 mg daily per home dose.  calcium and vitamin-D tablet for chronic steroids.    PT and OT  Case management consulted for dispo planning  1 dose lactulose today  Scheduled methocarbamol    DVT prophylaxis:  Lovenox 40    Anticipated discharge and disposition:   __________________________________________________________________________    NUTRITIONAL STATUS     Patient meets ASPEN criteria for   malnutrition of   per RD assessment as evidenced by:                       A minimum of two characteristics is recommended for diagnosis of either severe or non-severe malnutrition.     LABS/MICRO/MEDS/DIAGNOSTICS       LABS  Recent Labs     04/12/24  0616      K 3.6   CHLORIDE 103   CO2 27   BUN 15.7   CREATININE 0.82   GLUCOSE 107   CALCIUM 8.6*   ALKPHOS 34*   AST 17   ALT 9   ALBUMIN 2.7*     Recent Labs     04/12/24  0616   WBC 13.34*   RBC 3.47*   HCT 31.4*   MCV 90.5          MICROBIOLOGY  Microbiology Results (last 7 days)       ** No results found for the last 168 hours. **               MEDICATIONS  Current Facility-Administered Medications   Medication Dose Route Frequency Provider Last Rate Last Admin    acetaminophen tablet 650 mg  650 mg Oral Q4H PRN Roland Lincoln MD        aluminum-magnesium hydroxide-simethicone 200-200-20 mg/5 mL suspension 30 mL  30 mL Oral QID PRN Roland Lincoln MD        calcium-vitamin D3 500 mg-5 mcg (200 unit) per tablet 1 tablet  1 tablet Oral BID Derrek Rose MD   1 tablet at 04/12/24 2039    cloNIDine tablet 0.2 mg  0.2 mg Oral TID PRN Roland Lincoln MD        enoxaparin injection 40 mg  40 mg  Subcutaneous Daily Roland Lincoln MD   40 mg at 04/12/24 1843    heparin, porcine (PF) 100 unit/mL injection flush 500 Units  5 mL Intravenous On Call Procedure Roland Lincoln MD        hydrALAZINE injection 10 mg  10 mg Intravenous Q4H PRN Roland Lincoln MD        HYDROcodone-acetaminophen 5-325 mg per tablet 1 tablet  1 tablet Oral Q6H PRN Roland Lincoln MD   1 tablet at 04/13/24 0423    labetaloL injection 10 mg  10 mg Intravenous Q4H PRN Roland Lincoln MD   10 mg at 04/11/24 1142    lactulose 10 gram/15 ml solution 20 g  20 g Oral Once Derrek Rose MD        lisinopriL tablet 10 mg  10 mg Oral Daily Derrek Rose MD   10 mg at 04/12/24 0850    melatonin tablet 6 mg  6 mg Oral Nightly PRN Roland Lincoln MD   6 mg at 04/11/24 2048    methocarbamoL tablet 500 mg  500 mg Oral QID PRN Roland Lincoln MD   500 mg at 04/12/24 2039    methylPREDNISolone tablet 4 mg  4 mg Oral Daily Roland Lincoln MD   4 mg at 04/12/24 0850    morphine injection 4 mg  4 mg Intravenous Q4H PRN Roland Lincoln MD   4 mg at 04/12/24 1409    ondansetron injection 4 mg  4 mg Intravenous Q4H PRN Roland Lincoln MD   4 mg at 04/11/24 2105    polyethylene glycol packet 17 g  17 g Oral BID PRN Roland Lincoln MD        prochlorperazine injection Soln 5 mg  5 mg Intravenous Q6H PRN Roland Lincoln MD   5 mg at 04/12/24 0114    senna-docusate 8.6-50 mg per tablet 2 tablet  2 tablet Oral BID PRN Roland Lincoln MD        senna-docusate 8.6-50 mg per tablet 2 tablet  2 tablet Oral BID Derrek Rose MD   2 tablet at 04/12/24 0850    sodium chloride 0.9% flush 10 mL  10 mL Intravenous PRN Roland Lincoln MD             INFUSIONS  Current Facility-Administered Medications   Medication Dose Route Frequency Provider Last Rate Last Admin    acetaminophen tablet 650 mg  650 mg Oral Q4H PRN Roland Lincoln MD        aluminum-magnesium hydroxide-simethicone 200-200-20 mg/5 mL suspension 30 mL  30 mL Oral QID PRN Roland Lincoln MD        calcium-vitamin D3 500 mg-5 mcg (200 unit)  per tablet 1 tablet  1 tablet Oral BID Derrek Rose MD   1 tablet at 04/12/24 2039    cloNIDine tablet 0.2 mg  0.2 mg Oral TID PRN Roland Lincoln MD        enoxaparin injection 40 mg  40 mg Subcutaneous Daily Roland Lincoln MD   40 mg at 04/12/24 1843    heparin, porcine (PF) 100 unit/mL injection flush 500 Units  5 mL Intravenous On Call Procedure Roland Lincoln MD        hydrALAZINE injection 10 mg  10 mg Intravenous Q4H PRN Roland Lincoln MD        HYDROcodone-acetaminophen 5-325 mg per tablet 1 tablet  1 tablet Oral Q6H PRN Roland Lincoln MD   1 tablet at 04/13/24 0423    labetaloL injection 10 mg  10 mg Intravenous Q4H PRN Roland Lincoln MD   10 mg at 04/11/24 1142    lactulose 10 gram/15 ml solution 20 g  20 g Oral Once Derrek Rose MD        lisinopriL tablet 10 mg  10 mg Oral Daily Derrek Rose MD   10 mg at 04/12/24 0850    melatonin tablet 6 mg  6 mg Oral Nightly PRN Roland Lincoln MD   6 mg at 04/11/24 2048    methocarbamoL tablet 500 mg  500 mg Oral QID PRN Roland Lincoln MD   500 mg at 04/12/24 2039    methylPREDNISolone tablet 4 mg  4 mg Oral Daily Roland Lincoln MD   4 mg at 04/12/24 0850    morphine injection 4 mg  4 mg Intravenous Q4H PRN Roland Linconl MD   4 mg at 04/12/24 1409    ondansetron injection 4 mg  4 mg Intravenous Q4H PRN Roland Lincoln MD   4 mg at 04/11/24 2105    polyethylene glycol packet 17 g  17 g Oral BID PRN Roland Lincoln MD        prochlorperazine injection Soln 5 mg  5 mg Intravenous Q6H PRN Roland Lincoln MD   5 mg at 04/12/24 0114    senna-docusate 8.6-50 mg per tablet 2 tablet  2 tablet Oral BID PRN Roland Lincoln MD        senna-docusate 8.6-50 mg per tablet 2 tablet  2 tablet Oral BID Derrek Rose MD   2 tablet at 04/12/24 0850    sodium chloride 0.9% flush 10 mL  10 mL Intravenous PRN Roland Lincoln MD              DIAGNOSTIC TESTS  X-Ray Femur 2 View Right   Final Result      Improved alignment following internal fixation of the right femoral neck.         Electronically signed  "by: Mahi Crocker   Date:    04/11/2024   Time:    11:47      SURG FL Surgery Fluoro Usage   Final Result      X-Ray Knee Complete 4 or More Views Left   Final Result      Tricompartmental degenerative changes.      Suprapatellar effusion.         Electronically signed by: Rachid Otero   Date:    04/11/2024   Time:    08:58      X-Ray Knee 1 or 2 View Right   Final Result      Tricompartmental degenerative changes.         Electronically signed by: Rachid Otero   Date:    04/11/2024   Time:    08:56      X-Ray Chest 1 View   Final Result      NO ACUTE CARDIOPULMONARY PROCESS IDENTIFIED.         Electronically signed by: Orion Pettit   Date:    04/10/2024   Time:    22:09      X-Ray Hip 2 or 3 views Right (with Pelvis when performed)   Final Result      Right intertrochanteric fracture.         Electronically signed by: Orion Pettit   Date:    04/10/2024   Time:    22:10           No results found for: "EF"         Case related differential diagnoses have been reviewed; assessment and plan has been documented. I have personally reviewed the labs and test results that are currently available; I have reviewed the patients medication list. I have reviewed the consulting providers recommendations. I have reviewed or attempted to review medical records based upon their availability.  All of the patient's and/or family's questions have been addressed and answered to the best of my ability.  I will continue to monitor closely and make adjustments to medical management as needed.  This document was created using M*Modal Fluency Direct.  Transcription errors may have been made.  Please contact me if any questions may rise regarding documentation to clarify transcription.        Derrek Rose MD   Internal Medicine  Department of Hospital Medicine [facility] - Periop Services               "

## 2024-04-14 LAB
ALBUMIN SERPL-MCNC: 2.4 G/DL (ref 3.4–4.8)
ALBUMIN/GLOB SERPL: 0.7 RATIO (ref 1.1–2)
ALP SERPL-CCNC: 32 UNIT/L (ref 40–150)
ALT SERPL-CCNC: 6 UNIT/L (ref 0–55)
AST SERPL-CCNC: 18 UNIT/L (ref 5–34)
BASOPHILS # BLD AUTO: 0.03 X10(3)/MCL
BASOPHILS NFR BLD AUTO: 0.4 %
BILIRUB SERPL-MCNC: 0.6 MG/DL
BUN SERPL-MCNC: 17.7 MG/DL (ref 8.4–25.7)
CALCIUM SERPL-MCNC: 9 MG/DL (ref 8.8–10)
CHLORIDE SERPL-SCNC: 101 MMOL/L (ref 98–107)
CO2 SERPL-SCNC: 30 MMOL/L (ref 23–31)
CREAT SERPL-MCNC: 0.83 MG/DL (ref 0.73–1.18)
EOSINOPHIL # BLD AUTO: 0.09 X10(3)/MCL (ref 0–0.9)
EOSINOPHIL NFR BLD AUTO: 1.1 %
ERYTHROCYTE [DISTWIDTH] IN BLOOD BY AUTOMATED COUNT: 12.9 % (ref 11.5–17)
GFR SERPLBLD CREATININE-BSD FMLA CKD-EPI: >60 MLS/MIN/1.73/M2
GLOBULIN SER-MCNC: 3.3 GM/DL (ref 2.4–3.5)
GLUCOSE SERPL-MCNC: 93 MG/DL (ref 82–115)
HCT VFR BLD AUTO: 28.9 % (ref 42–52)
HGB BLD-MCNC: 9.5 G/DL (ref 14–18)
IMM GRANULOCYTES # BLD AUTO: 0.06 X10(3)/MCL (ref 0–0.04)
IMM GRANULOCYTES NFR BLD AUTO: 0.7 %
LYMPHOCYTES # BLD AUTO: 1.58 X10(3)/MCL (ref 0.6–4.6)
LYMPHOCYTES NFR BLD AUTO: 19.4 %
MCH RBC QN AUTO: 30.1 PG (ref 27–31)
MCHC RBC AUTO-ENTMCNC: 32.9 G/DL (ref 33–36)
MCV RBC AUTO: 91.5 FL (ref 80–94)
MONOCYTES # BLD AUTO: 0.75 X10(3)/MCL (ref 0.1–1.3)
MONOCYTES NFR BLD AUTO: 9.2 %
NEUTROPHILS # BLD AUTO: 5.64 X10(3)/MCL (ref 2.1–9.2)
NEUTROPHILS NFR BLD AUTO: 69.2 %
NRBC BLD AUTO-RTO: 0 %
PLATELET # BLD AUTO: 181 X10(3)/MCL (ref 130–400)
PMV BLD AUTO: 10.4 FL (ref 7.4–10.4)
POTASSIUM SERPL-SCNC: 3.6 MMOL/L (ref 3.5–5.1)
PROT SERPL-MCNC: 5.7 GM/DL (ref 5.8–7.6)
RBC # BLD AUTO: 3.16 X10(6)/MCL (ref 4.7–6.1)
SODIUM SERPL-SCNC: 139 MMOL/L (ref 136–145)
WBC # SPEC AUTO: 8.15 X10(3)/MCL (ref 4.5–11.5)

## 2024-04-14 PROCEDURE — 85025 COMPLETE CBC W/AUTO DIFF WBC: CPT | Performed by: INTERNAL MEDICINE

## 2024-04-14 PROCEDURE — 97530 THERAPEUTIC ACTIVITIES: CPT | Mod: CO

## 2024-04-14 PROCEDURE — 25000003 PHARM REV CODE 250: Performed by: PHYSICIAN ASSISTANT

## 2024-04-14 PROCEDURE — 25000003 PHARM REV CODE 250: Performed by: INTERNAL MEDICINE

## 2024-04-14 PROCEDURE — 97535 SELF CARE MNGMENT TRAINING: CPT | Mod: CO

## 2024-04-14 PROCEDURE — 80053 COMPREHEN METABOLIC PANEL: CPT | Performed by: INTERNAL MEDICINE

## 2024-04-14 PROCEDURE — 11000001 HC ACUTE MED/SURG PRIVATE ROOM

## 2024-04-14 PROCEDURE — 63600175 PHARM REV CODE 636 W HCPCS: Performed by: INTERNAL MEDICINE

## 2024-04-14 RX ORDER — BISACODYL 10 MG/1
10 SUPPOSITORY RECTAL ONCE
Status: COMPLETED | OUTPATIENT
Start: 2024-04-14 | End: 2024-04-14

## 2024-04-14 RX ORDER — POLYETHYLENE GLYCOL 3350 17 G/17G
17 POWDER, FOR SOLUTION ORAL 2 TIMES DAILY
Status: DISCONTINUED | OUTPATIENT
Start: 2024-04-14 | End: 2024-04-17 | Stop reason: HOSPADM

## 2024-04-14 RX ADMIN — METHYLPREDNISOLONE 4 MG: 4 TABLET ORAL at 09:04

## 2024-04-14 RX ADMIN — SENNOSIDES AND DOCUSATE SODIUM 2 TABLET: 8.6; 5 TABLET ORAL at 09:04

## 2024-04-14 RX ADMIN — OXYCODONE HYDROCHLORIDE 10 MG: 10 TABLET ORAL at 09:04

## 2024-04-14 RX ADMIN — POLYETHYLENE GLYCOL 3350 17 G: 17 POWDER, FOR SOLUTION ORAL at 09:04

## 2024-04-14 RX ADMIN — ENOXAPARIN SODIUM 40 MG: 40 INJECTION SUBCUTANEOUS at 06:04

## 2024-04-14 RX ADMIN — Medication 6 MG: at 09:04

## 2024-04-14 RX ADMIN — BISACODYL 10 MG: 10 SUPPOSITORY RECTAL at 09:04

## 2024-04-14 RX ADMIN — METHOCARBAMOL 500 MG: 500 TABLET ORAL at 02:04

## 2024-04-14 RX ADMIN — METHOCARBAMOL 500 MG: 500 TABLET ORAL at 09:04

## 2024-04-14 RX ADMIN — LISINOPRIL 20 MG: 20 TABLET ORAL at 09:04

## 2024-04-14 RX ADMIN — Medication 1 TABLET: at 09:04

## 2024-04-14 NOTE — PROGRESS NOTES
Patient Name: Terence Tanner Jr.  MRN: [id number]  Admission Date: 4/10/2024  Hospital Length of Stay: 4 days  Attending Provider: Derrek Rose MD  Primary Care Provider: [facility] Primary Doctor  Follow-up For: Procedure(s) (LRB):  INSERTION, INTRAMEDULLARY ROSE MARY, FEMUR (Right)    Post-Operative Day: POD 3   Subjective:       Principal Orthopedic Problem: 3 Days Post-Op   IMN right IT fracture      Interval History:    Patient resting comfortably this morning. Family is at bedside. States pain medication change did help some. He was able to get up a little with therapy yesterday. States he is sore in the shoulders which also makes mobilizing difficult. No numbness or tingling distally.    Review of patient's allergies indicates:  No Known Allergies    Current Facility-Administered Medications   Medication Dose Route Frequency Provider Last Rate Last Admin    acetaminophen tablet 650 mg  650 mg Oral Q4H PRN Roland Lincoln MD        aluminum-magnesium hydroxide-simethicone 200-200-20 mg/5 mL suspension 30 mL  30 mL Oral QID PRN Roland Lincoln MD        calcium-vitamin D3 500 mg-5 mcg (200 unit) per tablet 1 tablet  1 tablet Oral BID Derrek Rose MD   1 tablet at 04/14/24 0939    cloNIDine tablet 0.2 mg  0.2 mg Oral TID PRN Roland Lincoln MD   0.2 mg at 04/13/24 1626    enoxaparin injection 40 mg  40 mg Subcutaneous Daily Roland Lincoln MD   40 mg at 04/13/24 1625    heparin, porcine (PF) 100 unit/mL injection flush 500 Units  5 mL Intravenous On Call Procedure Roland Lincoln MD        hydrALAZINE injection 10 mg  10 mg Intravenous Q4H PRN Roland Lincoln MD        labetaloL injection 10 mg  10 mg Intravenous Q4H PRN Roland Lincoln MD   10 mg at 04/11/24 1142    lisinopriL tablet 20 mg  20 mg Oral Daily Derrek Rose MD   20 mg at 04/14/24 0939    melatonin tablet 6 mg  6 mg Oral Nightly PRN Roland Lincoln MD   6 mg at 04/13/24 2036    methocarbamoL tablet 500 mg  500 mg Oral TID eDrrek Rose MD   500 mg at 04/14/24 0939     "methylPREDNISolone tablet 4 mg  4 mg Oral Daily Roland Lincoln MD   4 mg at 04/14/24 0939    ondansetron injection 4 mg  4 mg Intravenous Q4H PRN Roland Lincoln MD   4 mg at 04/13/24 1036    oxyCODONE immediate release tablet 5 mg  5 mg Oral Q6H PRN Humaira Ruelas PA-C   5 mg at 04/13/24 1404    oxyCODONE immediate release tablet Tab 10 mg  10 mg Oral Q4H PRN Humaira Ruelas PA-C   10 mg at 04/13/24 2036    polyethylene glycol packet 17 g  17 g Oral BID PRN Roland Lincoln MD   17 g at 04/13/24 2036    polyethylene glycol packet 17 g  17 g Oral BID Derrek Rose MD   17 g at 04/14/24 0946    prochlorperazine injection Soln 5 mg  5 mg Intravenous Q6H PRN Roland Lincoln MD   5 mg at 04/12/24 0114    senna-docusate 8.6-50 mg per tablet 2 tablet  2 tablet Oral BID Derrek Rose MD   2 tablet at 04/14/24 0900    sodium chloride 0.9% flush 10 mL  10 mL Intravenous PRN Roland Lincoln MD         Objective:     Vital Signs (Most Recent):  Temp: 97.9 °F (36.6 °C) (04/14/24 1102)  Pulse: 90 (04/14/24 1102)  Resp: 20 (04/14/24 1102)  BP: (!) 148/64 (04/14/24 1102)  SpO2: 97 % (04/14/24 1102) Vital Signs (24h Range):  Temp:  [97.9 °F (36.6 °C)-98.5 °F (36.9 °C)] 97.9 °F (36.6 °C)  Pulse:  [72-90] 90  Resp:  [16-20] 20  SpO2:  [95 %-97 %] 97 %  BP: (109-189)/(63-95) 148/64     Weight: 70.3 kg (155 lb)  Height: 5' 9" (175.3 cm)  Body mass index is 22.89 kg/m².      Intake/Output Summary (Last 24 hours) at 4/14/2024 1147  Last data filed at 4/14/2024 0730  Gross per 24 hour   Intake --   Output 400 ml   Net -400 ml       Physical Exam:   Ortho/SPM Exam  General the patient is alert and oriented x3 no acute distress nontoxic-appearing appropriate affect.    Constitutional: Vital signs are examined and stable.  Resp: No signs of labored breathing                        RLE: -Skin: Dressing removed and replace, incisions clean and dry without bleeding, moderate serosanguinous drainage; No signs of new abrasions or lacerations, " no scars,            -MSK: : Hip and Knee F/E, EHL/FHL, Gastroc/Tib anterior Strength 5/5; Tolerates full passive range of motion of the hip           -Neuro:  Sensation intact to light touch L3-S1 dermatomes           -Lymphatic: No signs of lymphadenopathy           -CV: Capillary refill is less than 2 seconds. DP pulse 2+ Compartments soft and compressible although does have moderate swelling.         Diagnostic Findings:   Significant Labs: BMP:   Recent Labs   Lab 04/14/24 0457      K 3.6   CO2 30   BUN 17.7   CREATININE 0.83   CALCIUM 9.0     CBC:   Recent Labs   Lab 04/14/24 0457   WBC 8.15   HGB 9.5*   HCT 28.9*        CMP:   Recent Labs   Lab 04/14/24 0457      K 3.6   CO2 30   BUN 17.7   CREATININE 0.83   CALCIUM 9.0   ALBUMIN 2.4*   BILITOT 0.6   ALKPHOS 32*   AST 18   ALT 6     All pertinent labs within the past 24 hours have been reviewed.        Significant Imaging: I have reviewed all pertinent imaging results/findings.     Assessment/Plan:     Active Diagnoses:    Diagnosis Date Noted POA    PRINCIPAL PROBLEM:  Closed fracture of right hip [S72.001A] 04/10/2024 Yes      Problems Resolved During this Admission:     -H&H down slightly but stable. Consistent with ABLA following injury. Vital signs stable.    -DVT ppx: Lovenox while in house, ASA 81 mg Bid upon d/c.   -WBAT to RLE, full ROM, work with therapy on mobilzing.   - Daily dry dressing changes to the right hip  -CM for d/c planning/ Dme supplies/Rehab referral as indicated.   - Follow up with Dr. Tatum in approx 2 weeks for wound check and repeat x rays.   -Ortho stable for DC at this time.  Please call with questions or concerns.     The above findings, diagnostics, and treatment plan were discussed with Dr. Baldwin who is in agreement with the plan of care except as stated in additional documentation.       Humaira Ruelas PA-C  [facility]   Orthopedic Trauma

## 2024-04-14 NOTE — PROGRESS NOTES
[facility] MEDICINE ~ PROGRESS NOTE        CHIEF COMPLAINT   Hospital follow up    HOSPITAL COURSE   79-year-old male with medical history of chronic rheumatoid arthritis, osteoarthritis with deformities on chronic methylprednisolone 4 mg daily present to the ED after a ground level fall with complaint of right hip pain.  Report he has significant arthritis and bowing at his knees and while trying to stand up he slipped and fell landing on his right side he immediately started having right hip pain and deformed hip.  Denies hitting his head or losing consciousness      On arrival to ED he was afebrile and hypertensive.  Labs notable for WBC 19.10, CMP unremarkable, urinalysis unremarkable.  Chest x-ray show no airspace disease, right hip x-ray showed right intertrochanteric femur fracture.     Orthopedic surgery consulted and planned for operative management tomorrow.  Referred to hospital medicine service for further evaluation and management.     He denies any history of heart disease or prior interventions, lung disease, or prior reaction to anesthesia.    Today  Pain is much better.  Checking with nurse to see if he had a bowel movement.        OBJECTIVE/PHYSICAL EXAM     VITAL SIGNS (MOST RECENT):  Temp: 98.5 °F (36.9 °C) (04/14/24 0727)  Pulse: 72 (04/14/24 0727)  Resp: 20 (04/14/24 0727)  BP: (!) 146/68 (04/14/24 0727)  SpO2: 95 % (04/14/24 0727) VITAL SIGNS (24 HOUR RANGE):  Temp:  [97.9 °F (36.6 °C)-98.5 °F (36.9 °C)] 98.5 °F (36.9 °C)  Pulse:  [] 72  Resp:  [16-20] 20  SpO2:  [95 %-97 %] 95 %  BP: (109-189)/(63-95) 146/68   GENERAL: In no acute distress, afebrile  HEENT:  CHEST: Clear to auscultation bilaterally  HEART: S1, S2, no appreciable murmur  ABDOMEN: Soft, nontender, BS +  MSK: Warm, no lower extremity edema, no clubbing or cyanosis  NEUROLOGIC: Alert and oriented x4, moving all extremities with good strength    INTEGUMENTARY:  PSYCHIATRY:        ASSESSMENT/PLAN   Closed right intertrochanteric femur fracture  Ground level fall  Hypertension-probably secondary to pain  Reactive leukocytosis/stress response      History osteoarthritis and rheumatoid arthritis on chronic methylprednisolone      Orthopedic surgery following.  Aspirin 81 b.i.d. on discharge.  Blood pressure improving, increase lisinopril to 20 mg  Continue methylprednisolone 4 mg daily per home dose.  calcium and vitamin-D tablet for chronic steroids.    PT and OT  Case management consulted for dispo planning  Scheduled methocarbamol    DVT prophylaxis:  Lovenox 40    Anticipated discharge and disposition:   __________________________________________________________________________    NUTRITIONAL STATUS     Patient meets ASPEN criteria for   malnutrition of   per RD assessment as evidenced by:                       A minimum of two characteristics is recommended for diagnosis of either severe or non-severe malnutrition.     LABS/MICRO/MEDS/DIAGNOSTICS       LABS  Recent Labs     04/14/24  0457      K 3.6   CHLORIDE 101   CO2 30   BUN 17.7   CREATININE 0.83   GLUCOSE 93   CALCIUM 9.0   ALKPHOS 32*   AST 18   ALT 6   ALBUMIN 2.4*     Recent Labs     04/14/24  0457   WBC 8.15   RBC 3.16*   HCT 28.9*   MCV 91.5          MICROBIOLOGY  Microbiology Results (last 7 days)       ** No results found for the last 168 hours. **               MEDICATIONS  Current Facility-Administered Medications   Medication Dose Route Frequency Provider Last Rate Last Admin    acetaminophen tablet 650 mg  650 mg Oral Q4H PRN Roland Lincoln MD        aluminum-magnesium hydroxide-simethicone 200-200-20 mg/5 mL suspension 30 mL  30 mL Oral QID PRN Roland Lincoln MD        calcium-vitamin D3 500 mg-5 mcg (200 unit) per tablet 1 tablet  1 tablet Oral BID Derrek Rose MD   1 tablet at 04/13/24 2036    cloNIDine tablet 0.2 mg  0.2 mg Oral TID PRN Roland Lincoln MD   0.2 mg at  04/13/24 1626    enoxaparin injection 40 mg  40 mg Subcutaneous Daily Roland Lincoln MD   40 mg at 04/13/24 1625    heparin, porcine (PF) 100 unit/mL injection flush 500 Units  5 mL Intravenous On Call Procedure Roland Lincoln MD        hydrALAZINE injection 10 mg  10 mg Intravenous Q4H PRN Roland Lincoln MD        labetaloL injection 10 mg  10 mg Intravenous Q4H PRN Roland Lincoln MD   10 mg at 04/11/24 1142    lisinopriL tablet 20 mg  20 mg Oral Daily Derrek Rose MD   20 mg at 04/13/24 0845    melatonin tablet 6 mg  6 mg Oral Nightly PRN Roland Lincoln MD   6 mg at 04/13/24 2036    methocarbamoL tablet 500 mg  500 mg Oral TID Derrek Rose MD   500 mg at 04/13/24 2036    methylPREDNISolone tablet 4 mg  4 mg Oral Daily Roland Lincoln MD   4 mg at 04/13/24 0845    ondansetron injection 4 mg  4 mg Intravenous Q4H PRN Roland Lincoln MD   4 mg at 04/13/24 1036    oxyCODONE immediate release tablet 5 mg  5 mg Oral Q6H PRN Humaira Ruelas PA-C   5 mg at 04/13/24 1404    oxyCODONE immediate release tablet Tab 10 mg  10 mg Oral Q4H PRN Humaira Ruelas PA-C   10 mg at 04/13/24 2036    polyethylene glycol packet 17 g  17 g Oral BID PRN Roland Lincoln MD   17 g at 04/13/24 2036    prochlorperazine injection Soln 5 mg  5 mg Intravenous Q6H PRN Roland Lincoln MD   5 mg at 04/12/24 0114    senna-docusate 8.6-50 mg per tablet 2 tablet  2 tablet Oral BID Derrek Rose MD   2 tablet at 04/13/24 2036    sodium chloride 0.9% flush 10 mL  10 mL Intravenous PRN Roland Lincoln MD             INFUSIONS  Current Facility-Administered Medications   Medication Dose Route Frequency Provider Last Rate Last Admin    acetaminophen tablet 650 mg  650 mg Oral Q4H PRN Roland Lincoln MD        aluminum-magnesium hydroxide-simethicone 200-200-20 mg/5 mL suspension 30 mL  30 mL Oral QID PRN Roland Lincoln MD        calcium-vitamin D3 500 mg-5 mcg (200 unit) per tablet 1 tablet  1 tablet Oral BID Derrek Rose MD   1 tablet at 04/13/24 2036     cloNIDine tablet 0.2 mg  0.2 mg Oral TID PRN Roland Lincoln MD   0.2 mg at 04/13/24 1626    enoxaparin injection 40 mg  40 mg Subcutaneous Daily Roland Licnoln MD   40 mg at 04/13/24 1625    heparin, porcine (PF) 100 unit/mL injection flush 500 Units  5 mL Intravenous On Call Procedure Roland Lincoln MD        hydrALAZINE injection 10 mg  10 mg Intravenous Q4H PRN Roland Lincoln MD        labetaloL injection 10 mg  10 mg Intravenous Q4H PRN Roland Lincoln MD   10 mg at 04/11/24 1142    lisinopriL tablet 20 mg  20 mg Oral Daily Derrek Rose MD   20 mg at 04/13/24 0845    melatonin tablet 6 mg  6 mg Oral Nightly PRN Roland Lincoln MD   6 mg at 04/13/24 2036    methocarbamoL tablet 500 mg  500 mg Oral TID Derrek Rose MD   500 mg at 04/13/24 2036    methylPREDNISolone tablet 4 mg  4 mg Oral Daily Roland Lincoln MD   4 mg at 04/13/24 0845    ondansetron injection 4 mg  4 mg Intravenous Q4H PRN Roland Lincoln MD   4 mg at 04/13/24 1036    oxyCODONE immediate release tablet 5 mg  5 mg Oral Q6H PRN Humaira Ruelas PA-C   5 mg at 04/13/24 1404    oxyCODONE immediate release tablet Tab 10 mg  10 mg Oral Q4H PRN Humaira Ruelas PA-C   10 mg at 04/13/24 2036    polyethylene glycol packet 17 g  17 g Oral BID PRN Roland Lincoln MD   17 g at 04/13/24 2036    prochlorperazine injection Soln 5 mg  5 mg Intravenous Q6H PRN Roland Lincoln MD   5 mg at 04/12/24 0114    senna-docusate 8.6-50 mg per tablet 2 tablet  2 tablet Oral BID Derrek Rose MD   2 tablet at 04/13/24 2036    sodium chloride 0.9% flush 10 mL  10 mL Intravenous PRN Roland Lincoln MD              DIAGNOSTIC TESTS  X-Ray Femur 2 View Right   Final Result      Improved alignment following internal fixation of the right femoral neck.         Electronically signed by: Mahi Crocker   Date:    04/11/2024   Time:    11:47      SURG FL Surgery Fluoro Usage   Final Result      X-Ray Knee Complete 4 or More Views Left   Final Result      Tricompartmental degenerative  "changes.      Suprapatellar effusion.         Electronically signed by: Rachid Otero   Date:    04/11/2024   Time:    08:58      X-Ray Knee 1 or 2 View Right   Final Result      Tricompartmental degenerative changes.         Electronically signed by: Rachid Otero   Date:    04/11/2024   Time:    08:56      X-Ray Chest 1 View   Final Result      NO ACUTE CARDIOPULMONARY PROCESS IDENTIFIED.         Electronically signed by: Orion Pettit   Date:    04/10/2024   Time:    22:09      X-Ray Hip 2 or 3 views Right (with Pelvis when performed)   Final Result      Right intertrochanteric fracture.         Electronically signed by: Orion Pettit   Date:    04/10/2024   Time:    22:10           No results found for: "EF"         Case related differential diagnoses have been reviewed; assessment and plan has been documented. I have personally reviewed the labs and test results that are currently available; I have reviewed the patients medication list. I have reviewed the consulting providers recommendations. I have reviewed or attempted to review medical records based upon their availability.  All of the patient's and/or family's questions have been addressed and answered to the best of my ability.  I will continue to monitor closely and make adjustments to medical management as needed.  This document was created using M*Modal Fluency Direct.  Transcription errors may have been made.  Please contact me if any questions may rise regarding documentation to clarify transcription.        Derrek Rose MD   Internal Medicine  Department of Hospital Medicine [facility]               "

## 2024-04-14 NOTE — PT/OT/SLP EVAL
Occupational Therapy   Treatment    Name: Terence Tanner Jr.  MRN: [id number]  Admitting Diagnosis:  Closed fracture of right hip  3 Days Post-Op    Recommendations:     Recommended therapy intensity at discharge: Moderate Intensity Therapy   Discharge Equipment Recommendations:   (TBD)  Barriers to discharge:       Assessment:     Terence Tanner Jr. is a 79 y.o. male with a medical diagnosis of Closed fracture of right hip.  He presents with mild complaints of pain but receptive to education and willing to participate in OT session. Performance deficits affecting function are weakness, impaired endurance, impaired self care skills, impaired functional mobility, decreased lower extremity function, gait instability, impaired balance, decreased safety awareness, pain.     Rehab Prognosis:  Good; patient would benefit from acute skilled OT services to address these deficits and reach maximum level of function.       Plan:     Patient to be seen 5 x/week to address the above listed problems via self-care/home management, therapeutic activities, therapeutic exercises  Plan of Care Expires: 05/11/24  Plan of Care Reviewed with: patient    Subjective     Pain/Comfort:  Pain Rating 1: 4/10  Location - Side 1: Right  Location 1: hip  Pain Addressed 1: Reposition, Distraction    Objective:     Communicated with: Nurse prior to session. Reports patient received laxative and suppository this AM. Patient found supine, HOB elevated with wife present  upon OT entry to room.    General Precautions: Standard, fall    Orthopedic Precautions:RLE weight bearing as tolerated  Braces: N/A  Respiratory Status: Room air     Occupational Performance:     Bed Mobility:    Patient completed Supine to Sit with minimum assistance and HOB elevated  Patient completed Sit to Supine with minimum assistance and HOB elevated.       Functional Mobility/Transfers: If pain is controlled, patient can be see without need for tech assistance.   Patient  completed Sit <> Stand Transfer with minimum assistance and of 2 persons  with  rolling walker   Patient completed Toilet Transfer Step Transfer technique with moderate assistance with  rolling walker.   Functional Mobility: Patient uses rolling walker to ambulate, EOB <> toilet with Mod A.   Declines offer to sit in recliner chair.     Activities of Daily Living:  Lower Body Dressing: minimum assistance ,in long sitting, to don personal brace to (R) knee. Patient reports several previous injuries/surgeries to (B) knees.   Toileting: minimum assistance for BM attempt. CHIANG provides assistance to ensure bottom is clean. Cues required for safety while standing with RW.  Due to pain with lowering onto/lifting off of toilet, CHIANG recommends and places BSC over toilet to increase height. Patient and wife both agreeable and state that is patient's usual method at home.        Patient Education:  Patient and spouse were provided with verbal education and demonstrations education regarding OT role/goals/POC, safety awareness, and BSC use over toilet, as well as call bell use .  Understanding was verbalized.      Patient left HOB elevated with all lines intact, call bell in reach, and wife present.    GOALS:   Multidisciplinary Problems       Occupational Therapy Goals          Problem: Occupational Therapy    Goal Priority Disciplines Outcome Interventions   Occupational Therapy Goal     OT, PT/OT Ongoing, Progressing    Description: Goals to be met 5/11/224    Pt will complete grooming seated at sink with LRAD with SBA.  Pt will complete UB dressing with SBA.  Pt will complete LB dressing with SBA using LRAD.  Pt will complete toileting with SBA using LRAD.  Pt will complete bedside commode transfer with SBA using LRAD.                         Time Tracking:     OT Date of Treatment: 04/14/24  OT Start Time: 0848  OT Stop Time: 0915  OT Total Time (min): 27 min    Billable Minutes:Self Care/Home Management 13  Therapeutic  Activity 14    OT/AMINA: AMINA     Number of AMINA visits since last OT visit: 2    4/14/2024

## 2024-04-15 PROCEDURE — 97535 SELF CARE MNGMENT TRAINING: CPT | Mod: CO

## 2024-04-15 PROCEDURE — 25000003 PHARM REV CODE 250: Performed by: INTERNAL MEDICINE

## 2024-04-15 PROCEDURE — 97530 THERAPEUTIC ACTIVITIES: CPT | Mod: CQ

## 2024-04-15 PROCEDURE — 11000001 HC ACUTE MED/SURG PRIVATE ROOM

## 2024-04-15 PROCEDURE — 63600175 PHARM REV CODE 636 W HCPCS: Mod: JZ,JG | Performed by: INTERNAL MEDICINE

## 2024-04-15 PROCEDURE — 63600175 PHARM REV CODE 636 W HCPCS: Performed by: INTERNAL MEDICINE

## 2024-04-15 PROCEDURE — 97116 GAIT TRAINING THERAPY: CPT | Mod: CQ

## 2024-04-15 PROCEDURE — 25000003 PHARM REV CODE 250: Performed by: PHYSICIAN ASSISTANT

## 2024-04-15 RX ORDER — LISINOPRIL 20 MG/1
40 TABLET ORAL DAILY
Status: DISCONTINUED | OUTPATIENT
Start: 2024-04-15 | End: 2024-04-17 | Stop reason: HOSPADM

## 2024-04-15 RX ADMIN — OXYCODONE HYDROCHLORIDE 10 MG: 10 TABLET ORAL at 09:04

## 2024-04-15 RX ADMIN — POLYETHYLENE GLYCOL 3350 17 G: 17 POWDER, FOR SOLUTION ORAL at 09:04

## 2024-04-15 RX ADMIN — METHOCARBAMOL 500 MG: 500 TABLET ORAL at 09:04

## 2024-04-15 RX ADMIN — Medication 1 TABLET: at 09:04

## 2024-04-15 RX ADMIN — ENOXAPARIN SODIUM 40 MG: 40 INJECTION SUBCUTANEOUS at 03:04

## 2024-04-15 RX ADMIN — LISINOPRIL 40 MG: 20 TABLET ORAL at 09:04

## 2024-04-15 RX ADMIN — OXYCODONE HYDROCHLORIDE 10 MG: 10 TABLET ORAL at 03:04

## 2024-04-15 RX ADMIN — METHOCARBAMOL 500 MG: 500 TABLET ORAL at 02:04

## 2024-04-15 RX ADMIN — METHYLPREDNISOLONE 4 MG: 4 TABLET ORAL at 09:04

## 2024-04-15 RX ADMIN — OXYCODONE HYDROCHLORIDE 5 MG: 5 TABLET ORAL at 05:04

## 2024-04-15 RX ADMIN — SENNOSIDES AND DOCUSATE SODIUM 2 TABLET: 8.6; 5 TABLET ORAL at 09:04

## 2024-04-15 RX ADMIN — METHYLNALTREXONE BROMIDE 12 MG: 12 INJECTION, SOLUTION SUBCUTANEOUS at 09:04

## 2024-04-15 RX ADMIN — Medication 6 MG: at 09:04

## 2024-04-15 NOTE — PT/OT/SLP PROGRESS
Occupational Therapy   Treatment    Name: Terence Tanner Jr.  MRN: [id number]  Admitting Diagnosis:  Closed fracture of right hip  4 Days Post-Op    Recommendations:     Recommended therapy intensity at discharge: Moderate Intensity Therapy   Discharge Equipment Recommendations:   (TBD)  Barriers to discharge:       Assessment:     Terence Tanner Jr. is a 79 y.o. male with a medical diagnosis of Closed fracture of right hip. Performance deficits affecting function are weakness, impaired endurance, impaired self care skills, impaired functional mobility, gait instability, impaired balance, decreased upper extremity function, decreased lower extremity function, decreased safety awareness, pain.     Rehab Prognosis:  Good; patient would benefit from acute skilled OT services to address these deficits and reach maximum level of function.       Plan:     Patient to be seen 5 x/week to address the above listed problems via self-care/home management, therapeutic activities, therapeutic exercises  Plan of Care Expires: 05/11/24  Plan of Care Reviewed with: patient, spouse    Subjective     Pain/Comfort:  Location - Side 1: Right  Location 1: hip  Pain Addressed 1: Reposition, Distraction    Objective:     Communicated with: RN prior to session.  Patient found supine with peripheral IV upon OT entry to room.    General Precautions: Standard, fall    Orthopedic Precautions:RLE weight bearing as tolerated  Braces:  (Pt wears R knee brace from home)  Respiratory Status: Room air     Occupational Performance:     Bed Mobility:    Patient completed Scooting/Bridging with minimum assistance  Patient completed Supine to Sit with minimum assistance     Functional Mobility/Transfers:  Patient completed Sit <> Stand Transfer with moderate assistance  with  rolling walker   Functional Mobility: ambulated to bathroom with Min A and RW. Fatigues quickly.     Activities of Daily Living:  Toileting: performed toilet t/f with Min A. BSC placed  over toilet for increased height. Attempting to have BM however unsuccessful.   LE dressing: Total A to don socks. Able to don R knee brace with SBA from long sitting position.     Therapeutic Positioning    OT interventions performed during the course of today's session in an effort to prevent and/or reduce acquired pressure injuries:   Education was provided on benefits of and recommendations for therapeutic positioning    [facility] 6 Click ADL:      Patient Education:  Patient provided with verbal education education regarding OT role/goals/POC, fall prevention, and safety awareness.  Understanding was verbalized.      Patient left up in chair with all lines intact, call button in reach, and wife present.    GOALS:   Multidisciplinary Problems       Occupational Therapy Goals          Problem: Occupational Therapy    Goal Priority Disciplines Outcome Interventions   Occupational Therapy Goal     OT, PT/OT Ongoing, Progressing    Description: Goals to be met 5/11/224    Pt will complete grooming seated at sink with LRAD with SBA.  Pt will complete UB dressing with SBA.  Pt will complete LB dressing with SBA using LRAD.  Pt will complete toileting with SBA using LRAD.  Pt will complete bedside commode transfer with SBA using LRAD.                         Time Tracking:     OT Date of Treatment: 04/15/24  OT Start Time: 1047  OT Stop Time: 1116  OT Total Time (min): 29 min    Billable Minutes:Self Care/Home Management 29    OT/AMINA: AMINA     Number of AMINA visits since last OT visit: 3    4/15/2024

## 2024-04-15 NOTE — TREATMENT PLAN
Updated clinicals were sent to YANIV Stoddard via [employer] and fax.  [employer] denied patient's case, out of network with patient's insurance.

## 2024-04-15 NOTE — PROGRESS NOTES
[facility] MEDICINE ~ PROGRESS NOTE        CHIEF COMPLAINT   Hospital follow up    HOSPITAL COURSE   79-year-old male with medical history of chronic rheumatoid arthritis, osteoarthritis with deformities on chronic methylprednisolone 4 mg daily present to the ED after a ground level fall with complaint of right hip pain.  Report he has significant arthritis and bowing at his knees and while trying to stand up he slipped and fell landing on his right side he immediately started having right hip pain and deformed hip.  Denies hitting his head or losing consciousness      On arrival to ED he was afebrile and hypertensive.  Labs notable for WBC 19.10, CMP unremarkable, urinalysis unremarkable.  Chest x-ray show no airspace disease, right hip x-ray showed right intertrochanteric femur fracture.     Orthopedic surgery consulted and planned for operative management tomorrow.  Referred to hospital medicine service for further evaluation and management.     He denies any history of heart disease or prior interventions, lung disease, or prior reaction to anesthesia.    Today  Still has not had a bowel movement.  Give Relistor and enema today.  Received bisacodyl yesterday.        OBJECTIVE/PHYSICAL EXAM     VITAL SIGNS (MOST RECENT):  Temp: 98 °F (36.7 °C) (04/15/24 0711)  Pulse: 80 (04/15/24 0711)  Resp: 18 (04/15/24 0539)  BP: (!) 159/78 (04/15/24 0711)  SpO2: 95 % (04/15/24 0711) VITAL SIGNS (24 HOUR RANGE):  Temp:  [97.7 °F (36.5 °C)-99.1 °F (37.3 °C)] 98 °F (36.7 °C)  Pulse:  [78-97] 80  Resp:  [18-20] 18  SpO2:  [94 %-97 %] 95 %  BP: (148-163)/(64-85) 159/78   GENERAL: In no acute distress, afebrile  HEENT:  CHEST: Clear to auscultation bilaterally  HEART: S1, S2, no appreciable murmur  ABDOMEN: Soft, nontender, BS +  MSK: Warm, no lower extremity edema, no clubbing or cyanosis  NEUROLOGIC: Alert and oriented x4, moving all extremities with good strength    INTEGUMENTARY:  PSYCHIATRY:        ASSESSMENT/PLAN   Closed right intertrochanteric femur fracture  Ground level fall  Hypertension-probably secondary to pain  Reactive leukocytosis/stress response      History osteoarthritis and rheumatoid arthritis on chronic methylprednisolone      Orthopedic surgery following.  Aspirin 81 b.i.d. on discharge.  Blood pressure improving, increase lisinopril to 20 mg  Continue methylprednisolone 4 mg daily per home dose.  calcium and vitamin-D tablet for chronic steroids.    PT and OT  Case management consulted for dispo planning  Scheduled methocarbamol  Fleets enema and starting Relistor    DVT prophylaxis:  Lovenox 40    Anticipated discharge and disposition:  Medically stable for discharge once he has a bowel movement and he has placement  __________________________________________________________________________    NUTRITIONAL STATUS     Patient meets ASPEN criteria for   malnutrition of   per RD assessment as evidenced by:                       A minimum of two characteristics is recommended for diagnosis of either severe or non-severe malnutrition.     LABS/MICRO/MEDS/DIAGNOSTICS       LABS  Recent Labs     04/14/24  0457      K 3.6   CHLORIDE 101   CO2 30   BUN 17.7   CREATININE 0.83   GLUCOSE 93   CALCIUM 9.0   ALKPHOS 32*   AST 18   ALT 6   ALBUMIN 2.4*     Recent Labs     04/14/24  0457   WBC 8.15   RBC 3.16*   HCT 28.9*   MCV 91.5          MICROBIOLOGY  Microbiology Results (last 7 days)       ** No results found for the last 168 hours. **               MEDICATIONS  Current Facility-Administered Medications   Medication Dose Route Frequency Provider Last Rate Last Admin    acetaminophen tablet 650 mg  650 mg Oral Q4H PRN Roland Lincoln MD        aluminum-magnesium hydroxide-simethicone 200-200-20 mg/5 mL suspension 30 mL  30 mL Oral QID PRN Roland Lincoln MD        calcium-vitamin D3 500 mg-5 mcg (200 unit) per tablet 1 tablet  1 tablet Oral BID Derrek Rose  MD DAVEY   1 tablet at 04/14/24 2108    cloNIDine tablet 0.2 mg  0.2 mg Oral TID PRN Roland Lincoln MD   0.2 mg at 04/13/24 1626    enoxaparin injection 40 mg  40 mg Subcutaneous Daily Roland Lincoln MD   40 mg at 04/14/24 1801    heparin, porcine (PF) 100 unit/mL injection flush 500 Units  5 mL Intravenous On Call Procedure Roland Lincoln MD        hydrALAZINE injection 10 mg  10 mg Intravenous Q4H PRN Roland Lincoln MD        labetaloL injection 10 mg  10 mg Intravenous Q4H PRN Roland Lincoln MD   10 mg at 04/11/24 1142    lisinopriL tablet 20 mg  20 mg Oral Daily Derrek Rose MD   20 mg at 04/14/24 0939    melatonin tablet 6 mg  6 mg Oral Nightly PRN Roland Lincoln MD   6 mg at 04/14/24 2108    methocarbamoL tablet 500 mg  500 mg Oral TID Derrek Rose MD   500 mg at 04/14/24 2108    methylnaltrexone 12 mg/0.6 mL subcutaneous injection 12 mg  12 mg Subcutaneous Every other day Derrek Rose MD        methylPREDNISolone tablet 4 mg  4 mg Oral Daily SalazarRoland rcooks MD   4 mg at 04/14/24 0939    ondansetron injection 4 mg  4 mg Intravenous Q4H PRN Roland Lincoln MD   4 mg at 04/13/24 1036    oxyCODONE immediate release tablet 5 mg  5 mg Oral Q6H PRN Humaira Ruelas PA-C   5 mg at 04/15/24 0539    oxyCODONE immediate release tablet Tab 10 mg  10 mg Oral Q4H PRN Humaira Ruelas PA-C   10 mg at 04/14/24 2109    polyethylene glycol packet 17 g  17 g Oral BID PRN Roland Lincoln MD   17 g at 04/13/24 2036    polyethylene glycol packet 17 g  17 g Oral BID Derrek Rose MD   17 g at 04/14/24 2108    prochlorperazine injection Soln 5 mg  5 mg Intravenous Q6H PRN Roland Lincoln MD   5 mg at 04/12/24 0114    senna-docusate 8.6-50 mg per tablet 2 tablet  2 tablet Oral BID Derrek Rose MD   2 tablet at 04/14/24 2108    sodium chloride 0.9% flush 10 mL  10 mL Intravenous PRN Roland Lincoln MD             INFUSIONS  Current Facility-Administered Medications   Medication Dose Route Frequency Provider Last Rate Last Admin     acetaminophen tablet 650 mg  650 mg Oral Q4H PRN Roland Lincoln MD        aluminum-magnesium hydroxide-simethicone 200-200-20 mg/5 mL suspension 30 mL  30 mL Oral QID PRN Roland Lincoln MD        calcium-vitamin D3 500 mg-5 mcg (200 unit) per tablet 1 tablet  1 tablet Oral BID Derrek Rose MD   1 tablet at 04/14/24 2108    cloNIDine tablet 0.2 mg  0.2 mg Oral TID PRN Roland Lincoln MD   0.2 mg at 04/13/24 1626    enoxaparin injection 40 mg  40 mg Subcutaneous Daily Roland Lincoln MD   40 mg at 04/14/24 1801    heparin, porcine (PF) 100 unit/mL injection flush 500 Units  5 mL Intravenous On Call Procedure Roland Lincoln MD        hydrALAZINE injection 10 mg  10 mg Intravenous Q4H PRN Roland Lincoln MD        labetaloL injection 10 mg  10 mg Intravenous Q4H PRN Roland Lincoln MD   10 mg at 04/11/24 1142    lisinopriL tablet 20 mg  20 mg Oral Daily Derrek Rose MD   20 mg at 04/14/24 0939    melatonin tablet 6 mg  6 mg Oral Nightly PRN Roland Lincoln MD   6 mg at 04/14/24 2108    methocarbamoL tablet 500 mg  500 mg Oral TID Derrek Rose MD   500 mg at 04/14/24 2108    methylnaltrexone 12 mg/0.6 mL subcutaneous injection 12 mg  12 mg Subcutaneous Every other day Derrek Rose MD        methylPREDNISolone tablet 4 mg  4 mg Oral Daily Roland Lincoln MD   4 mg at 04/14/24 0939    ondansetron injection 4 mg  4 mg Intravenous Q4H PRN Roland Lincoln MD   4 mg at 04/13/24 1036    oxyCODONE immediate release tablet 5 mg  5 mg Oral Q6H PRN Humaira Ruelas PA-C   5 mg at 04/15/24 0539    oxyCODONE immediate release tablet Tab 10 mg  10 mg Oral Q4H PRN Humaira Ruelas PA-C   10 mg at 04/14/24 2109    polyethylene glycol packet 17 g  17 g Oral BID PRN Roland Linconl MD   17 g at 04/13/24 2036    polyethylene glycol packet 17 g  17 g Oral BID Derrek Rose MD   17 g at 04/14/24 2108    prochlorperazine injection Soln 5 mg  5 mg Intravenous Q6H PRN Roland Lincoln MD   5 mg at 04/12/24 0114    senna-docusate 8.6-50 mg per tablet 2  "tablet  2 tablet Oral BID Derrek Rose MD   2 tablet at 04/14/24 2108    sodium chloride 0.9% flush 10 mL  10 mL Intravenous PRN Roland Lincoln MD              DIAGNOSTIC TESTS  X-Ray Femur 2 View Right   Final Result      Improved alignment following internal fixation of the right femoral neck.         Electronically signed by: Mahi Crocker   Date:    04/11/2024   Time:    11:47      SURG FL Surgery Fluoro Usage   Final Result      X-Ray Knee Complete 4 or More Views Left   Final Result      Tricompartmental degenerative changes.      Suprapatellar effusion.         Electronically signed by: Rachid Otero   Date:    04/11/2024   Time:    08:58      X-Ray Knee 1 or 2 View Right   Final Result      Tricompartmental degenerative changes.         Electronically signed by: Rachid Otero   Date:    04/11/2024   Time:    08:56      X-Ray Chest 1 View   Final Result      NO ACUTE CARDIOPULMONARY PROCESS IDENTIFIED.         Electronically signed by: Orion Pettit   Date:    04/10/2024   Time:    22:09      X-Ray Hip 2 or 3 views Right (with Pelvis when performed)   Final Result      Right intertrochanteric fracture.         Electronically signed by: Orion Pettit   Date:    04/10/2024   Time:    22:10           No results found for: "EF"         Case related differential diagnoses have been reviewed; assessment and plan has been documented. I have personally reviewed the labs and test results that are currently available; I have reviewed the patients medication list. I have reviewed the consulting providers recommendations. I have reviewed or attempted to review medical records based upon their availability.  All of the patient's and/or family's questions have been addressed and answered to the best of my ability.  I will continue to monitor closely and make adjustments to medical management as needed.  This document was created using M*Modal Fluency Direct.  Transcription errors may have been made.  Please " contact me if any questions may rise regarding documentation to clarify transcription.        Derrek Rose MD   Internal Medicine  Department of Hospital Medicine [facility] - Periop Services

## 2024-04-15 NOTE — PLAN OF CARE
April updates me that [facility] is also not in network. This discussed with April, pt and wife at bedside.   I called [facility] as they will call pts wife with some options. Elvira at [facility] is who talked with Najma Menendezfany tells me that she will contact wife to see what she wants to do.   We do know that wife feels strongly about the location being close to their home in [place], that they wanted skilled and then plan for return home.  Elvira will be discussing with wife about pt coming and they would switch him to medicare, he would be there until May 1st with no therapy.     Therapy is recommended but the decisions is up to pt and wife.    In the meantime April will send referral to [facility] to see if they are in network. [facility] in [place] was also discussed.  Will follow up with Elvira after she talks with wife.

## 2024-04-15 NOTE — PT/OT/SLP PROGRESS
Physical Therapy Treatment    Patient Name:  Terence Tanner Jr.   MRN:  [id number]    Recommendations:     Discharge therapy intensity: Moderate Intensity Therapy   Discharge Equipment Recommendations: to be determined by next level of care  Barriers to discharge: Impaired mobility    Assessment:     Terence Tanner Jr. is a 79 y.o. male admitted with a medical diagnosis of fallx2 resulting in right displaced intertrochanteric femur fracture s/p IMN. Hx of rheumatoid arthritis.  He presents with the following impairments/functional limitations: weakness, impaired functional mobility, impaired endurance, gait instability, impaired balance, decreased lower extremity function, orthopedic precautions .      Rehab Prognosis: Good; patient would benefit from acute skilled PT services to address these deficits and reach maximum level of function.    Recent Surgery: Procedure(s) (LRB):  INSERTION, INTRAMEDULLARY ROSE MARY, FEMUR (Right) 4 Days Post-Op    Plan:     During this hospitalization, patient to be seen 6 x/week to address the identified rehab impairments via gait training, therapeutic activities, therapeutic exercises and progress toward the following goals:    Plan of Care Expires:  05/11/24    Subjective     Chief Complaint: right hip and knee pain  Patient/Family Comments/goals: none stated  Pain/Comfort:  Pain Rating 1: 6/10  Location - Side 1: Right  Location - Orientation 1: generalized  Location 1: hip  Pain Addressed 1: Pre-medicate for activity, Reposition, Distraction      Objective:     Communicated with nurse prior to session.  Patient found up in chair with peripheral IV upon PT entry to room.     General Precautions: Standard, fall  Orthopedic Precautions: RLE weight bearing as tolerated  Braces:  (pt wears R knee brace from home)  Respiratory Status: Room air  Blood Pressure: NT  Skin Integrity: Visible skin intact      Functional Mobility:  Bed Mobility:     Rolling Right: stand by assistance  Sit to Supine:  min assistance  Transfers:     Sit to Stand:  min assistance with rolling walker x5 trials  Toilet transfer: Fay with rolling walker   Gait: patient amb 12ft, 14ft with rolling walker with Fay with step to gait pattern. Patient presents with decreased knee flexion during ambulation and required verbal cues for upright posture.    Education:  Patient provided with verbal education education regarding PT role/goals/POC, post-op precautions, fall prevention, and safety awareness.  Understanding was verbalized.     Patient left HOB elevated with all lines intact and call button in reach    GOALS:   Multidisciplinary Problems       Physical Therapy Goals          Problem: Physical Therapy    Goal Priority Disciplines Outcome Goal Variances Interventions   Physical Therapy Goal     PT, PT/OT Ongoing, Progressing     Description: Goals to be met by: d/c     Patient will increase functional independence with mobility by performin. Supine to sit with Stand-by Assistance  2. Sit to supine with Stand-by Assistance  3. Sit to stand transfer with Stand-by Assistance  4. Bed to chair transfer with Stand-by Assistance using Rolling Walker  5. Gait  x 30 feet with Stand-by Assistance using Standard Walker.                          Time Tracking:     PT Received On: 04/15/24  PT Start Time: 1320     PT Stop Time: 1343  PT Total Time (min): 23 min     Billable Minutes: Gait Training 10 and Therapeutic Activity 13    Treatment Type: Treatment  PT/PTA: PTA     Number of PTA visits since last PT visit: 3     04/15/2024

## 2024-04-16 PROCEDURE — 25000003 PHARM REV CODE 250: Performed by: INTERNAL MEDICINE

## 2024-04-16 PROCEDURE — 97535 SELF CARE MNGMENT TRAINING: CPT | Mod: CO

## 2024-04-16 PROCEDURE — 97530 THERAPEUTIC ACTIVITIES: CPT | Mod: CQ

## 2024-04-16 PROCEDURE — 25000003 PHARM REV CODE 250: Performed by: PHYSICIAN ASSISTANT

## 2024-04-16 PROCEDURE — 63600175 PHARM REV CODE 636 W HCPCS: Performed by: INTERNAL MEDICINE

## 2024-04-16 PROCEDURE — 11000001 HC ACUTE MED/SURG PRIVATE ROOM

## 2024-04-16 PROCEDURE — 51798 US URINE CAPACITY MEASURE: CPT

## 2024-04-16 RX ORDER — OXYCODONE HYDROCHLORIDE 5 MG/1
5 TABLET ORAL EVERY 4 HOURS PRN
Status: DISCONTINUED | OUTPATIENT
Start: 2024-04-16 | End: 2024-04-17 | Stop reason: HOSPADM

## 2024-04-16 RX ADMIN — Medication 1 TABLET: at 08:04

## 2024-04-16 RX ADMIN — OXYCODONE HYDROCHLORIDE 5 MG: 5 TABLET ORAL at 04:04

## 2024-04-16 RX ADMIN — CLONIDINE HYDROCHLORIDE 0.2 MG: 0.2 TABLET ORAL at 12:04

## 2024-04-16 RX ADMIN — METHOCARBAMOL 500 MG: 500 TABLET ORAL at 02:04

## 2024-04-16 RX ADMIN — METHOCARBAMOL 500 MG: 500 TABLET ORAL at 09:04

## 2024-04-16 RX ADMIN — OXYCODONE HYDROCHLORIDE 10 MG: 10 TABLET ORAL at 04:04

## 2024-04-16 RX ADMIN — SENNOSIDES AND DOCUSATE SODIUM 2 TABLET: 8.6; 5 TABLET ORAL at 09:04

## 2024-04-16 RX ADMIN — Medication 1 TABLET: at 09:04

## 2024-04-16 RX ADMIN — OXYCODONE HYDROCHLORIDE 10 MG: 10 TABLET ORAL at 01:04

## 2024-04-16 RX ADMIN — OXYCODONE HYDROCHLORIDE 10 MG: 10 TABLET ORAL at 09:04

## 2024-04-16 RX ADMIN — ENOXAPARIN SODIUM 40 MG: 40 INJECTION SUBCUTANEOUS at 04:04

## 2024-04-16 RX ADMIN — OXYCODONE HYDROCHLORIDE 5 MG: 5 TABLET ORAL at 08:04

## 2024-04-16 RX ADMIN — METHYLPREDNISOLONE 4 MG: 4 TABLET ORAL at 09:04

## 2024-04-16 RX ADMIN — LISINOPRIL 40 MG: 20 TABLET ORAL at 09:04

## 2024-04-16 RX ADMIN — METHOCARBAMOL 500 MG: 500 TABLET ORAL at 08:04

## 2024-04-16 RX ADMIN — Medication 6 MG: at 08:04

## 2024-04-16 NOTE — PT/OT/SLP PROGRESS
Physical Therapy Treatment    Patient Name:  Terence Tanner Jr.   MRN:  [id number]    Recommendations:     Discharge therapy intensity: Moderate Intensity Therapy   Discharge Equipment Recommendations: to be determined by next level of care  Barriers to discharge: Impaired mobility    Assessment:     Terence Tanner Jr. is a 79 y.o. male admitted with a medical diagnosis of fallx2 resulting in right displaced intertrochanteric femur fracture s/p IMN. Hx of rheumatoid arthritis.  He presents with the following impairments/functional limitations: weakness, impaired functional mobility, impaired endurance, gait instability, impaired balance, decreased lower extremity function, orthopedic precautions .    Patient refused ambulation this session due to increased pain despite max encouragement.    Rehab Prognosis: Good; patient would benefit from acute skilled PT services to address these deficits and reach maximum level of function.    Recent Surgery: Procedure(s) (LRB):  INSERTION, INTRAMEDULLARY ROSE MARY, FEMUR (Right) 5 Days Post-Op    Plan:     During this hospitalization, patient to be seen 6 x/week to address the identified rehab impairments via gait training, therapeutic activities, therapeutic exercises and progress toward the following goals:    Plan of Care Expires:  05/11/24    Subjective     Chief Complaint: right hip and knee pain  Patient/Family Comments/goals: none stated  Pain/Comfort:  Pain Rating 1: other (see comments) (did not rate)  Location - Side 1: Right  Location - Orientation 1: generalized  Location 1: hip  Pain Addressed 1: Reposition, Distraction, Cessation of Activity      Objective:     Communicated with nurse prior to session.  Patient found up in chair with telemetry upon PT entry to room.     General Precautions: Standard, fall  Orthopedic Precautions: RLE weight bearing as tolerated  Braces:  (R knee brace from home)  Respiratory Status: Room air  Blood Pressure: NT  Skin Integrity: Visible skin  intact      Functional Mobility:  Bed Mobility:     Rolling Right: stand by assistance  Sit to Supine: min assistance  Transfers:     Sit to Stand:  min assistance with rolling walker x1 trial  Chair to bed: Fay with rolling walker   Gait: patient refused to ambulate this session.    Education:  Patient provided with verbal education education regarding PT role/goals/POC, post-op precautions, fall prevention, and safety awareness.  Understanding was verbalized.     Patient left HOB elevated with all lines intact and call button in reach    GOALS:   Multidisciplinary Problems       Physical Therapy Goals          Problem: Physical Therapy    Goal Priority Disciplines Outcome Goal Variances Interventions   Physical Therapy Goal     PT, PT/OT Ongoing, Progressing     Description: Goals to be met by: d/c     Patient will increase functional independence with mobility by performin. Supine to sit with Stand-by Assistance  2. Sit to supine with Stand-by Assistance  3. Sit to stand transfer with Stand-by Assistance  4. Bed to chair transfer with Stand-by Assistance using Rolling Walker  5. Gait  x 30 feet with Stand-by Assistance using Standard Walker.                          Time Tracking:     PT Received On: 24  PT Start Time: 1117     PT Stop Time: 1132  PT Total Time (min): 15 min     Billable Minutes: Therapeutic Activity 15    Treatment Type: Treatment  PT/PTA: PTA     Number of PTA visits since last PT visit: 4     2024

## 2024-04-16 NOTE — PROGRESS NOTES
[facility] MEDICINE ~ PROGRESS NOTE        CHIEF COMPLAINT   Hospital follow up    HOSPITAL COURSE   79-year-old male with medical history of chronic rheumatoid arthritis, osteoarthritis with deformities on chronic methylprednisolone 4 mg daily present to the ED after a ground level fall with complaint of right hip pain.  Report he has significant arthritis and bowing at his knees and while trying to stand up he slipped and fell landing on his right side he immediately started having right hip pain and deformed hip.  Denies hitting his head or losing consciousness      On arrival to ED he was afebrile and hypertensive.  Labs notable for WBC 19.10, CMP unremarkable, urinalysis unremarkable.  Chest x-ray show no airspace disease, right hip x-ray showed right intertrochanteric femur fracture.     Orthopedic surgery consulted and planned for operative management tomorrow.  Referred to hospital medicine service for further evaluation and management.     He denies any history of heart disease or prior interventions, lung disease, or prior reaction to anesthesia.    Today  Pending SNF placement.  Had good bowel movement yesterday.        OBJECTIVE/PHYSICAL EXAM     VITAL SIGNS (MOST RECENT):  Temp: 98 °F (36.7 °C) (04/16/24 0721)  Pulse: 93 (04/16/24 0723)  Resp: 16 (04/16/24 0440)  BP: (!) 149/79 (04/16/24 0723)  SpO2: 99 % (04/16/24 0723) VITAL SIGNS (24 HOUR RANGE):  Temp:  [97.9 °F (36.6 °C)-98 °F (36.7 °C)] 98 °F (36.7 °C)  Pulse:  [89-98] 93  Resp:  [16-18] 16  SpO2:  [95 %-99 %] 99 %  BP: (137-159)/(73-84) 149/79   GENERAL: In no acute distress, afebrile  HEENT:  CHEST: Clear to auscultation bilaterally  HEART: S1, S2, no appreciable murmur  ABDOMEN: Soft, nontender, BS +  MSK: Warm, no lower extremity edema, no clubbing or cyanosis  NEUROLOGIC: Alert and oriented x4, moving all extremities with good strength   INTEGUMENTARY:  PSYCHIATRY:        ASSESSMENT/PLAN   Closed  right intertrochanteric femur fracture  Ground level fall  Hypertension-probably secondary to pain  Reactive leukocytosis/stress response      History osteoarthritis and rheumatoid arthritis on chronic methylprednisolone      Orthopedic surgery following.  Aspirin 81 b.i.d. on discharge.  Blood pressure improving, increase lisinopril to 40 mg  Continue methylprednisolone 4 mg daily per home dose.  calcium and vitamin-D tablet for chronic steroids.    PT and OT  Case management consulted for dispo planning  Scheduled methocarbamol    DVT prophylaxis:  Lovenox 40    Anticipated discharge and disposition:  Medically stable for discharge once he has placement  __________________________________________________________________________    NUTRITIONAL STATUS     Patient meets ASPEN criteria for   malnutrition of   per RD assessment as evidenced by:                       A minimum of two characteristics is recommended for diagnosis of either severe or non-severe malnutrition.     LABS/MICRO/MEDS/DIAGNOSTICS       LABS  Recent Labs     04/14/24  0457      K 3.6   CHLORIDE 101   CO2 30   BUN 17.7   CREATININE 0.83   GLUCOSE 93   CALCIUM 9.0   ALKPHOS 32*   AST 18   ALT 6   ALBUMIN 2.4*     Recent Labs     04/14/24  0457   WBC 8.15   RBC 3.16*   HCT 28.9*   MCV 91.5          MICROBIOLOGY  Microbiology Results (last 7 days)       ** No results found for the last 168 hours. **               MEDICATIONS  Current Facility-Administered Medications   Medication Dose Route Frequency Provider Last Rate Last Admin    acetaminophen tablet 650 mg  650 mg Oral Q4H PRN Roland Lincoln MD        aluminum-magnesium hydroxide-simethicone 200-200-20 mg/5 mL suspension 30 mL  30 mL Oral QID PRN Roland Lincoln MD        calcium-vitamin D3 500 mg-5 mcg (200 unit) per tablet 1 tablet  1 tablet Oral BID Derrek Rose MD   1 tablet at 04/15/24 2100    cloNIDine tablet 0.2 mg  0.2 mg Oral TID PRN Roland Lincoln MD   0.2 mg at 04/13/24 1626     enoxaparin injection 40 mg  40 mg Subcutaneous Daily Roland Lincoln MD   40 mg at 04/15/24 1545    heparin, porcine (PF) 100 unit/mL injection flush 500 Units  5 mL Intravenous On Call Procedure Roland Lincoln MD        hydrALAZINE injection 10 mg  10 mg Intravenous Q4H PRN Roland Lincoln MD        labetaloL injection 10 mg  10 mg Intravenous Q4H PRN Roland Lincoln MD   10 mg at 04/11/24 1142    lisinopriL tablet 40 mg  40 mg Oral Daily Derrek Rose MD   40 mg at 04/15/24 0922    melatonin tablet 6 mg  6 mg Oral Nightly PRN Roland Lincoln MD   6 mg at 04/15/24 2121    methocarbamoL tablet 500 mg  500 mg Oral TID Derrek Rose MD   500 mg at 04/15/24 2121    methylnaltrexone 12 mg/0.6 mL subcutaneous injection 12 mg  12 mg Subcutaneous Every other day Derrek Rose MD   12 mg at 04/15/24 0922    methylPREDNISolone tablet 4 mg  4 mg Oral Daily Roland Lincoln MD   4 mg at 04/15/24 0922    ondansetron injection 4 mg  4 mg Intravenous Q4H PRN Roland Lincoln MD   4 mg at 04/13/24 1036    oxyCODONE immediate release tablet 5 mg  5 mg Oral Q6H PRN Humaira Ruelas PA-C   5 mg at 04/16/24 0440    oxyCODONE immediate release tablet Tab 10 mg  10 mg Oral Q4H PRN Humaira Ruelas PA-C   10 mg at 04/15/24 2121    polyethylene glycol packet 17 g  17 g Oral BID PRN Roland Lincoln MD   17 g at 04/13/24 2036    polyethylene glycol packet 17 g  17 g Oral BID Derrek Rose MD   17 g at 04/15/24 0922    prochlorperazine injection Soln 5 mg  5 mg Intravenous Q6H PRN Roland Lincoln MD   5 mg at 04/12/24 0114    senna-docusate 8.6-50 mg per tablet 2 tablet  2 tablet Oral BID Derrek Rose MD   2 tablet at 04/15/24 0922    sodium chloride 0.9% flush 10 mL  10 mL Intravenous PRN Roland Lincoln MD             INFUSIONS  Current Facility-Administered Medications   Medication Dose Route Frequency Provider Last Rate Last Admin    acetaminophen tablet 650 mg  650 mg Oral Q4H PRN Roland Lincoln MD        aluminum-magnesium  hydroxide-simethicone 200-200-20 mg/5 mL suspension 30 mL  30 mL Oral QID PRN Roland Lincoln MD        calcium-vitamin D3 500 mg-5 mcg (200 unit) per tablet 1 tablet  1 tablet Oral BID Derrek Rose MD   1 tablet at 04/15/24 2100    cloNIDine tablet 0.2 mg  0.2 mg Oral TID PRN Roland Lincoln MD   0.2 mg at 04/13/24 1626    enoxaparin injection 40 mg  40 mg Subcutaneous Daily Roland Lincoln MD   40 mg at 04/15/24 1545    heparin, porcine (PF) 100 unit/mL injection flush 500 Units  5 mL Intravenous On Call Procedure Roland Lincoln MD        hydrALAZINE injection 10 mg  10 mg Intravenous Q4H PRN Roland Lincoln MD        labetaloL injection 10 mg  10 mg Intravenous Q4H PRN Roland Lincoln MD   10 mg at 04/11/24 1142    lisinopriL tablet 40 mg  40 mg Oral Daily Derrek Rose MD   40 mg at 04/15/24 0922    melatonin tablet 6 mg  6 mg Oral Nightly PRN Roland Lincoln MD   6 mg at 04/15/24 2121    methocarbamoL tablet 500 mg  500 mg Oral TID Derrek Rose MD   500 mg at 04/15/24 2121    methylnaltrexone 12 mg/0.6 mL subcutaneous injection 12 mg  12 mg Subcutaneous Every other day Derrek Rose MD   12 mg at 04/15/24 0922    methylPREDNISolone tablet 4 mg  4 mg Oral Daily Roland Lincoln MD   4 mg at 04/15/24 0922    ondansetron injection 4 mg  4 mg Intravenous Q4H PRN Roland Lincoln MD   4 mg at 04/13/24 1036    oxyCODONE immediate release tablet 5 mg  5 mg Oral Q6H PRN Humaira Ruelas PA-C   5 mg at 04/16/24 0440    oxyCODONE immediate release tablet Tab 10 mg  10 mg Oral Q4H PRN Humaira Ruelas PA-C   10 mg at 04/15/24 2121    polyethylene glycol packet 17 g  17 g Oral BID PRN Roland Lincoln MD   17 g at 04/13/24 2036    polyethylene glycol packet 17 g  17 g Oral BID Derrek Rose MD   17 g at 04/15/24 0922    prochlorperazine injection Soln 5 mg  5 mg Intravenous Q6H PRN Roland Lincoln MD   5 mg at 04/12/24 0114    senna-docusate 8.6-50 mg per tablet 2 tablet  2 tablet Oral BID Derrek Rose MD   2 tablet at 04/15/24 0922  "   sodium chloride 0.9% flush 10 mL  10 mL Intravenous PRN Roland Lincoln MD              DIAGNOSTIC TESTS  X-Ray Femur 2 View Right   Final Result      Improved alignment following internal fixation of the right femoral neck.         Electronically signed by: Mahi Crocker   Date:    04/11/2024   Time:    11:47      SURG FL Surgery Fluoro Usage   Final Result      X-Ray Knee Complete 4 or More Views Left   Final Result      Tricompartmental degenerative changes.      Suprapatellar effusion.         Electronically signed by: Rachid Otero   Date:    04/11/2024   Time:    08:58      X-Ray Knee 1 or 2 View Right   Final Result      Tricompartmental degenerative changes.         Electronically signed by: Rachid Otero   Date:    04/11/2024   Time:    08:56      X-Ray Chest 1 View   Final Result      NO ACUTE CARDIOPULMONARY PROCESS IDENTIFIED.         Electronically signed by: Orion Petitt   Date:    04/10/2024   Time:    22:09      X-Ray Hip 2 or 3 views Right (with Pelvis when performed)   Final Result      Right intertrochanteric fracture.         Electronically signed by: Orion Pettit   Date:    04/10/2024   Time:    22:10           No results found for: "EF"         Case related differential diagnoses have been reviewed; assessment and plan has been documented. I have personally reviewed the labs and test results that are currently available; I have reviewed the patients medication list. I have reviewed the consulting providers recommendations. I have reviewed or attempted to review medical records based upon their availability.  All of the patient's and/or family's questions have been addressed and answered to the best of my ability.  I will continue to monitor closely and make adjustments to medical management as needed.  This document was created using M*Modal Fluency Direct.  Transcription errors may have been made.  Please contact me if any questions may rise regarding documentation to clarify " transcription.        Derrek Rose MD   Internal Medicine  Department of Hospital Medicine [facility] - Periop Services

## 2024-04-16 NOTE — PT/OT/SLP PROGRESS
Occupational Therapy   Treatment    Name: Terence Tanner Jr.  MRN: [id number]  Admitting Diagnosis:  Closed fracture of right hip  5 Days Post-Op    Recommendations:     Recommended therapy intensity at discharge: Moderate Intensity Therapy   Discharge Equipment Recommendations:   (TBD)  Barriers to discharge:       Assessment:     Terence Tanner Jr. is a 79 y.o. male with a medical diagnosis of Closed fracture of right hip. Performance deficits affecting function are weakness, impaired endurance, impaired self care skills, impaired functional mobility, gait instability, impaired balance, decreased safety awareness, pain, decreased lower extremity function.     Rehab Prognosis:  Good; patient would benefit from acute skilled OT services to address these deficits and reach maximum level of function.       Plan:     Patient to be seen 5 x/week to address the above listed problems via self-care/home management, therapeutic activities, therapeutic exercises  Plan of Care Expires: 05/11/24  Plan of Care Reviewed with: patient, spouse    Subjective     Pain/Comfort:  Location - Side 1: Right  Location 1: knee  Pain Addressed 1: Reposition, Distraction    Objective:     Communicated with: RN prior to session.  Patient found supine with telemetry upon OT entry to room.    General Precautions: Standard, fall    Orthopedic Precautions:RLE weight bearing as tolerated  Braces:  (pt wears R knee brace from home)  Respiratory Status: Room air     Occupational Performance:     Bed Mobility:    Patient completed Scooting/Bridging with minimum assistance  Patient completed Supine to Sit with minimum assistance     Functional Mobility/Transfers:  Patient completed Sit <> Stand Transfer with minimum assistance  with  rolling walker   Functional Mobility: ambulated to sink in room with Min A and RW.     Activities of Daily Living:  Grooming: completed hand hygiene standing at sink with Mod A. Posterior LOB when letting go of RW.    Toileting: Min A to use urinal in standing.     Therapeutic Positioning    OT interventions performed during the course of today's session in an effort to prevent and/or reduce acquired pressure injuries:   Education was provided on benefits of and recommendations for therapeutic positioning    [facility] 6 Click ADL:      Patient Education:  Patient provided with verbal education education regarding OT role/goals/POC, fall prevention, and safety awareness.  Understanding was verbalized.      Patient left up in chair with all lines intact, call button in reach, and wife present.    GOALS:   Multidisciplinary Problems       Occupational Therapy Goals          Problem: Occupational Therapy    Goal Priority Disciplines Outcome Interventions   Occupational Therapy Goal     OT, PT/OT Ongoing, Progressing    Description: Goals to be met 5/11/224    Pt will complete grooming seated at sink with LRAD with SBA.  Pt will complete UB dressing with SBA.  Pt will complete LB dressing with SBA using LRAD.  Pt will complete toileting with SBA using LRAD.  Pt will complete bedside commode transfer with SBA using LRAD.                         Time Tracking:     OT Date of Treatment: 04/16/24  OT Start Time: 0915  OT Stop Time: 0930  OT Total Time (min): 15 min    Billable Minutes:Self Care/Home Management 15    OT/AMINA: AMINA     Number of AMINA visits since last OT visit: 4    4/16/2024

## 2024-04-16 NOTE — PLAN OF CARE
Rounded on patient and spouse both are requesting skilled nursing referral be sent to [facility], [facility], and [facility]. Referral sent via [facility].      [facility] is willing to accept patient however there would be a $80 copay per day. Also informed patient and spouse [facility] is out of network. Spouse would like [facility], [facility]

## 2024-04-17 ENCOUNTER — HOSPITAL ENCOUNTER (INPATIENT)
Facility: HOSPITAL | Age: 79
LOS: 14 days | Discharge: HOME HEALTH | DRG: 560 | End: 2024-05-01
Attending: STUDENT IN AN ORGANIZED HEALTH CARE EDUCATION/TRAINING PROGRAM | Admitting: STUDENT IN AN ORGANIZED HEALTH CARE EDUCATION/TRAINING PROGRAM
Payer: MEDICARE

## 2024-04-17 VITALS
SYSTOLIC BLOOD PRESSURE: 154 MMHG | TEMPERATURE: 98 F | HEIGHT: 69 IN | HEART RATE: 74 BPM | BODY MASS INDEX: 22.96 KG/M2 | RESPIRATION RATE: 16 BRPM | DIASTOLIC BLOOD PRESSURE: 87 MMHG | WEIGHT: 155 LBS | OXYGEN SATURATION: 98 %

## 2024-04-17 DIAGNOSIS — S72.001A CLOSED FRACTURE OF RIGHT HIP: ICD-10-CM

## 2024-04-17 DIAGNOSIS — R07.9 CHEST PAIN: ICD-10-CM

## 2024-04-17 DIAGNOSIS — N40.0 BENIGN PROSTATIC HYPERPLASIA, UNSPECIFIED WHETHER LOWER URINARY TRACT SYMPTOMS PRESENT: Primary | ICD-10-CM

## 2024-04-17 PROBLEM — I10 HYPERTENSION: Status: ACTIVE | Noted: 2024-04-17
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
## 2024-04-17 LAB
APPEARANCE UR: CLEAR
BACTERIA #/AREA URNS AUTO: NORMAL /HPF
BILIRUB UR QL STRIP.AUTO: NEGATIVE
COLOR UR AUTO: YELLOW
GLUCOSE UR QL STRIP.AUTO: NEGATIVE
KETONES UR QL STRIP.AUTO: NEGATIVE
LEUKOCYTE ESTERASE UR QL STRIP.AUTO: NEGATIVE
NITRITE UR QL STRIP.AUTO: NEGATIVE
PH UR STRIP.AUTO: 7.5 [PH]
PROT UR QL STRIP.AUTO: NEGATIVE
RBC #/AREA URNS AUTO: NORMAL /HPF
RBC UR QL AUTO: ABNORMAL
SP GR UR STRIP.AUTO: 1.02 (ref 1–1.03)
SQUAMOUS #/AREA URNS AUTO: NORMAL /HPF
UROBILINOGEN UR STRIP-ACNC: 0.2
WBC #/AREA URNS AUTO: NORMAL /HPF

## 2024-04-17 PROCEDURE — 97166 OT EVAL MOD COMPLEX 45 MIN: CPT

## 2024-04-17 PROCEDURE — 97535 SELF CARE MNGMENT TRAINING: CPT

## 2024-04-17 PROCEDURE — 97161 PT EVAL LOW COMPLEX 20 MIN: CPT

## 2024-04-17 PROCEDURE — 99900035 HC TECH TIME PER 15 MIN (STAT)

## 2024-04-17 PROCEDURE — 25000003 PHARM REV CODE 250: Performed by: INTERNAL MEDICINE

## 2024-04-17 PROCEDURE — 97530 THERAPEUTIC ACTIVITIES: CPT

## 2024-04-17 PROCEDURE — 63600175 PHARM REV CODE 636 W HCPCS: Performed by: PHYSICIAN ASSISTANT

## 2024-04-17 PROCEDURE — 81003 URINALYSIS AUTO W/O SCOPE: CPT | Performed by: PHYSICIAN ASSISTANT

## 2024-04-17 PROCEDURE — 25000003 PHARM REV CODE 250: Performed by: STUDENT IN AN ORGANIZED HEALTH CARE EDUCATION/TRAINING PROGRAM

## 2024-04-17 PROCEDURE — 97116 GAIT TRAINING THERAPY: CPT

## 2024-04-17 PROCEDURE — 25000003 PHARM REV CODE 250: Performed by: PHYSICIAN ASSISTANT

## 2024-04-17 PROCEDURE — 63600175 PHARM REV CODE 636 W HCPCS: Performed by: INTERNAL MEDICINE

## 2024-04-17 PROCEDURE — 11000004 HC SNF PRIVATE

## 2024-04-17 PROCEDURE — 94760 N-INVAS EAR/PLS OXIMETRY 1: CPT

## 2024-04-17 PROCEDURE — 92610 EVALUATE SWALLOWING FUNCTION: CPT

## 2024-04-17 RX ORDER — METHOCARBAMOL 500 MG/1
500 TABLET, FILM COATED ORAL 3 TIMES DAILY
Status: ON HOLD
Start: 2024-04-17 | End: 2024-05-01 | Stop reason: HOSPADM

## 2024-04-17 RX ORDER — FERROUS SULFATE, DRIED 160(50) MG
1 TABLET, EXTENDED RELEASE ORAL 2 TIMES DAILY
Status: DISCONTINUED | OUTPATIENT
Start: 2024-04-17 | End: 2024-05-01 | Stop reason: HOSPADM

## 2024-04-17 RX ORDER — AMOXICILLIN 250 MG
2 CAPSULE ORAL 2 TIMES DAILY
Status: DISCONTINUED | OUTPATIENT
Start: 2024-04-17 | End: 2024-04-28

## 2024-04-17 RX ORDER — ALUMINUM HYDROXIDE, MAGNESIUM HYDROXIDE, AND SIMETHICONE 1200; 120; 1200 MG/30ML; MG/30ML; MG/30ML
30 SUSPENSION ORAL 4 TIMES DAILY PRN
Status: DISCONTINUED | OUTPATIENT
Start: 2024-04-17 | End: 2024-05-01 | Stop reason: HOSPADM

## 2024-04-17 RX ORDER — ACETAMINOPHEN 325 MG/1
650 TABLET ORAL EVERY 4 HOURS PRN
Status: DISCONTINUED | OUTPATIENT
Start: 2024-04-17 | End: 2024-05-01 | Stop reason: HOSPADM

## 2024-04-17 RX ORDER — HYDRALAZINE HYDROCHLORIDE 20 MG/ML
10 INJECTION INTRAMUSCULAR; INTRAVENOUS EVERY 4 HOURS PRN
Status: DISCONTINUED | OUTPATIENT
Start: 2024-04-17 | End: 2024-05-01 | Stop reason: HOSPADM

## 2024-04-17 RX ORDER — OXYCODONE HYDROCHLORIDE 10 MG/1
10 TABLET ORAL EVERY 6 HOURS PRN
Qty: 12 TABLET | Status: ON HOLD
Start: 2024-04-17 | End: 2024-05-01 | Stop reason: HOSPADM

## 2024-04-17 RX ORDER — IPRATROPIUM BROMIDE AND ALBUTEROL SULFATE 2.5; .5 MG/3ML; MG/3ML
3 SOLUTION RESPIRATORY (INHALATION) EVERY 6 HOURS PRN
Status: DISCONTINUED | OUTPATIENT
Start: 2024-04-17 | End: 2024-05-01 | Stop reason: HOSPADM

## 2024-04-17 RX ORDER — LABETALOL HCL 20 MG/4 ML
10 SYRINGE (ML) INTRAVENOUS 4 TIMES DAILY PRN
Status: DISCONTINUED | OUTPATIENT
Start: 2024-04-17 | End: 2024-05-01 | Stop reason: HOSPADM

## 2024-04-17 RX ORDER — GUAIFENESIN 100 MG/5ML
200 SOLUTION ORAL EVERY 4 HOURS PRN
Status: DISCONTINUED | OUTPATIENT
Start: 2024-04-17 | End: 2024-05-01 | Stop reason: HOSPADM

## 2024-04-17 RX ORDER — TALC
9 POWDER (GRAM) TOPICAL NIGHTLY PRN
Status: DISCONTINUED | OUTPATIENT
Start: 2024-04-17 | End: 2024-05-01 | Stop reason: HOSPADM

## 2024-04-17 RX ORDER — ONDANSETRON 4 MG/1
8 TABLET, ORALLY DISINTEGRATING ORAL EVERY 8 HOURS PRN
Status: DISCONTINUED | OUTPATIENT
Start: 2024-04-17 | End: 2024-05-01 | Stop reason: HOSPADM

## 2024-04-17 RX ORDER — LISINOPRIL 20 MG/1
40 TABLET ORAL DAILY
Status: DISCONTINUED | OUTPATIENT
Start: 2024-04-17 | End: 2024-05-01 | Stop reason: HOSPADM

## 2024-04-17 RX ORDER — SODIUM CHLORIDE 0.9 % (FLUSH) 0.9 %
10 SYRINGE (ML) INJECTION
Status: DISCONTINUED | OUTPATIENT
Start: 2024-04-17 | End: 2024-05-01 | Stop reason: HOSPADM

## 2024-04-17 RX ORDER — OXYCODONE HYDROCHLORIDE 10 MG/1
10 TABLET ORAL EVERY 6 HOURS PRN
Status: DISCONTINUED | OUTPATIENT
Start: 2024-04-17 | End: 2024-04-18

## 2024-04-17 RX ORDER — FAMOTIDINE 20 MG/1
20 TABLET, FILM COATED ORAL 2 TIMES DAILY
Status: DISCONTINUED | OUTPATIENT
Start: 2024-04-17 | End: 2024-04-25

## 2024-04-17 RX ORDER — POLYETHYLENE GLYCOL 3350 17 G/17G
17 POWDER, FOR SOLUTION ORAL 3 TIMES DAILY PRN
Status: DISCONTINUED | OUTPATIENT
Start: 2024-04-17 | End: 2024-05-01 | Stop reason: HOSPADM

## 2024-04-17 RX ORDER — POLYETHYLENE GLYCOL 3350 17 G/17G
17 POWDER, FOR SOLUTION ORAL 2 TIMES DAILY PRN
Status: ON HOLD
Start: 2024-04-17 | End: 2024-05-01 | Stop reason: HOSPADM

## 2024-04-17 RX ORDER — NALOXONE HCL 0.4 MG/ML
0.02 VIAL (ML) INJECTION
Status: DISCONTINUED | OUTPATIENT
Start: 2024-04-17 | End: 2024-05-01 | Stop reason: HOSPADM

## 2024-04-17 RX ORDER — AMOXICILLIN 250 MG
2 CAPSULE ORAL 2 TIMES DAILY
Status: ON HOLD
Start: 2024-04-17 | End: 2024-05-01 | Stop reason: HOSPADM

## 2024-04-17 RX ORDER — BENZONATATE 100 MG/1
100 CAPSULE ORAL 3 TIMES DAILY PRN
Status: DISCONTINUED | OUTPATIENT
Start: 2024-04-17 | End: 2024-05-01 | Stop reason: HOSPADM

## 2024-04-17 RX ORDER — FERROUS SULFATE, DRIED 160(50) MG
1 TABLET, EXTENDED RELEASE ORAL 2 TIMES DAILY
Start: 2024-04-17 | End: 2025-04-17

## 2024-04-17 RX ORDER — CALCIUM CARBONATE 200(500)MG
500 TABLET,CHEWABLE ORAL 3 TIMES DAILY PRN
Status: DISCONTINUED | OUTPATIENT
Start: 2024-04-17 | End: 2024-05-01 | Stop reason: HOSPADM

## 2024-04-17 RX ORDER — PANTOPRAZOLE SODIUM 40 MG/1
40 TABLET, DELAYED RELEASE ORAL DAILY
Status: DISCONTINUED | OUTPATIENT
Start: 2024-04-17 | End: 2024-04-17

## 2024-04-17 RX ORDER — MORPHINE SULFATE 4 MG/ML
2 INJECTION, SOLUTION INTRAMUSCULAR; INTRAVENOUS EVERY 6 HOURS PRN
Status: DISCONTINUED | OUTPATIENT
Start: 2024-04-17 | End: 2024-04-23

## 2024-04-17 RX ORDER — ASPIRIN 81 MG/1
81 TABLET ORAL 2 TIMES DAILY
Status: ON HOLD
Start: 2024-04-17 | End: 2024-05-01 | Stop reason: HOSPADM

## 2024-04-17 RX ORDER — METHYLPREDNISOLONE 4 MG/1
4 TABLET ORAL DAILY
Status: DISCONTINUED | OUTPATIENT
Start: 2024-04-17 | End: 2024-04-18

## 2024-04-17 RX ORDER — POLYETHYLENE GLYCOL 3350 17 G/17G
17 POWDER, FOR SOLUTION ORAL 2 TIMES DAILY PRN
Status: DISCONTINUED | OUTPATIENT
Start: 2024-04-17 | End: 2024-04-17 | Stop reason: SDUPTHER

## 2024-04-17 RX ORDER — ENOXAPARIN SODIUM 100 MG/ML
40 INJECTION SUBCUTANEOUS EVERY 24 HOURS
Status: DISCONTINUED | OUTPATIENT
Start: 2024-04-17 | End: 2024-05-01 | Stop reason: HOSPADM

## 2024-04-17 RX ORDER — BISACODYL 10 MG/1
10 SUPPOSITORY RECTAL DAILY PRN
Status: DISCONTINUED | OUTPATIENT
Start: 2024-04-17 | End: 2024-05-01 | Stop reason: HOSPADM

## 2024-04-17 RX ORDER — LISINOPRIL 40 MG/1
40 TABLET ORAL DAILY
Status: ON HOLD
Start: 2024-04-17 | End: 2024-05-01

## 2024-04-17 RX ORDER — CLONIDINE HYDROCHLORIDE 0.2 MG/1
0.2 TABLET ORAL 3 TIMES DAILY PRN
Status: ON HOLD
Start: 2024-04-17 | End: 2024-05-01 | Stop reason: HOSPADM

## 2024-04-17 RX ORDER — ONDANSETRON HYDROCHLORIDE 2 MG/ML
4 INJECTION, SOLUTION INTRAVENOUS EVERY 8 HOURS PRN
Status: DISCONTINUED | OUTPATIENT
Start: 2024-04-17 | End: 2024-05-01 | Stop reason: HOSPADM

## 2024-04-17 RX ORDER — SIMETHICONE 80 MG
1 TABLET,CHEWABLE ORAL 4 TIMES DAILY PRN
Status: DISCONTINUED | OUTPATIENT
Start: 2024-04-17 | End: 2024-05-01 | Stop reason: HOSPADM

## 2024-04-17 RX ORDER — HYDROCODONE BITARTRATE AND ACETAMINOPHEN 5; 325 MG/1; MG/1
1 TABLET ORAL EVERY 6 HOURS PRN
Status: DISCONTINUED | OUTPATIENT
Start: 2024-04-17 | End: 2024-04-19

## 2024-04-17 RX ORDER — METHOCARBAMOL 500 MG/1
500 TABLET, FILM COATED ORAL 3 TIMES DAILY
Status: DISCONTINUED | OUTPATIENT
Start: 2024-04-17 | End: 2024-04-19

## 2024-04-17 RX ADMIN — ACETAMINOPHEN 650 MG: 325 TABLET, FILM COATED ORAL at 02:04

## 2024-04-17 RX ADMIN — OXYCODONE HYDROCHLORIDE 10 MG: 10 TABLET ORAL at 08:04

## 2024-04-17 RX ADMIN — SENNOSIDES AND DOCUSATE SODIUM 2 TABLET: 8.6; 5 TABLET ORAL at 08:04

## 2024-04-17 RX ADMIN — OXYCODONE HYDROCHLORIDE 10 MG: 10 TABLET ORAL at 01:04

## 2024-04-17 RX ADMIN — FAMOTIDINE 20 MG: 20 TABLET, FILM COATED ORAL at 08:04

## 2024-04-17 RX ADMIN — METHOCARBAMOL 500 MG: 500 TABLET ORAL at 08:04

## 2024-04-17 RX ADMIN — METHYLPREDNISOLONE 4 MG: 4 TABLET ORAL at 08:04

## 2024-04-17 RX ADMIN — Medication 1 TABLET: at 08:04

## 2024-04-17 RX ADMIN — ENOXAPARIN SODIUM 40 MG: 40 INJECTION SUBCUTANEOUS at 05:04

## 2024-04-17 RX ADMIN — LISINOPRIL 40 MG: 20 TABLET ORAL at 08:04

## 2024-04-17 RX ADMIN — ACETAMINOPHEN 650 MG: 325 TABLET, FILM COATED ORAL at 06:04

## 2024-04-17 RX ADMIN — ACETAMINOPHEN 650 MG: 325 TABLET ORAL at 05:04

## 2024-04-17 RX ADMIN — METHOCARBAMOL 500 MG: 500 TABLET ORAL at 02:04

## 2024-04-17 NOTE — PT/OT/SLP PROGRESS
Occupational Therapy  Treatment    Name: Terence Tanner Jr.    : 1945 (79 y.o.)  MRN: [id number]           TREATMENT SUMMARY AND RECOMMENDATIONS:      OT Date of Treatment: 24  OT Start Time: 1305  OT Stop Time: 1316  OT Total Time (min): 11 min      Subjective Assessment:   No complaints  Lethargic   X Awake, alert, cooperative  Impulsive    Uncooperative   Flat affect    Agitated X c/o pain    Appropriate  c/o fatigue    Confused X Treated at bedside     Emotionally labile  Treated in gym/dept.      Other:        Therapy Precautions:    Cognitive deficits  Spinal precautions    Collar - hard  Sternal precautions    Collar - soft   TLSO   X Fall risk  LSO    Hip precautions - posterior  Knee immobilizer    Hip precautions - anterior X WBAT RLE    Impaired communication  Partial weightbearing    Oxygen  TTWB    PEG tube  NWB    Visual deficits      Hearing deficits  X Other: ROMAT RLE       Treatment Objectives:     Mobility Training:    Mobility task Assist level Comments    Bed mobility SBA-Min A Semi supine>EOB (R side) SBA  EOB>supine (L side) Min A for RLE mgmt   Transfer     Sit to stands transitions CGA From EOB level   Functional mobility CGA Pt ambulated short distances throughout room c RW.   Sitting balance     Standing balance      Other:        ADL Training:    ADL Assist level Comments    Feeding     Grooming/hygiene     Bathing     Upper body dressing     Lower body dressing     Toileting Max A For hygiene mgmt   Toilet transfer Min A Stand step t/f c RW EOB<>toilet   Adaptive equipment training     Other:         Additional Comments:  Pt c poor tolerance this PM.    Assessment: Patient tolerated session well. Pt demonstrates improvements in bed mobility this session. Pt would continue to benefit from skilled OT services to improve pt's safety and independence with daily occupations, decrease caregiver burden, and reduce pt's risk of falls.    GOALS:   Multidisciplinary Problems        Occupational Therapy Goals          Problem: Occupational Therapy    Goal Priority Disciplines Outcome Interventions   Occupational Therapy Goal     OT, PT/OT Ongoing, Progressing    Description: Goals to be met by: 5/17/2024     Patient will increase functional independence with ADLs by performing:    LE Dressing with Stand-by Assistance.  Grooming while standing at sink with Stand-by Assistance.  Toileting from toilet with Stand-by Assistance for hygiene and clothing management.   Toilet transfer to toilet with Stand-by Assistance.                         Recommendations:     Discharge Equipment Recommendations:  none     Plan:     Patient to be seen  (5-6x/week (QD-BID)) to address the above listed problems via self-care/home management, neuromuscular re-education, therapeutic activities, therapeutic exercises  Plan of Care Expires: 05/17/24  Plan of Care Reviewed with: patient, spouse  Revisions made to plan of care: No      Skilled OT Minutes Provided: 11  Communication with Treatment Team:     Equipment recommendations:       At end of treatment, patient remained:   Up in chair     Up in wheelchair in room   X In bed   X With alarm activated   X Bed rails up   X Call bell in reach     Family/friends present    Restraints secured properly    In bathroom with CNA/RN notified    In gym with PT/PTA/tech    Nurse aware    Other:

## 2024-04-17 NOTE — NURSING
Nurses Note -- 4 Eyes      4/17/2024   6:20 PM      Skin assessed during: Transfer      [] No Altered Skin Integrity Present    []Prevention Measures Documented      [x] Yes- Altered Skin Integrity Present or Discovered   [] LDA Added if Not in Epic (Describe Wound)   [x] New Altered Skin Integrity was Present on Admit and Documented in LDA   [x] Wound Image Taken    Wound Care Consulted? No    Attending Nurse:  Sun Mcpherson RN/Staff Member:   Verona Carney. NILAN

## 2024-04-17 NOTE — PT/OT/SLP EVAL
"Occupational Therapy Evaluation    Name: Terence Tanner Jr.  MRN: [id number]  Admitting Diagnosis: Closed fracture of right hip  Recent Surgery: * No surgery found *      Recommendations:     Discharge Recommendations: Low Intensity Therapy  Level of Assistance Recommended: 24 hours light assistance  Discharge Equipment Recommendations: none  Barriers to discharge: Other (Comment) (current level of assist needed)    Assessment:     Terence Tanner Jr. is a 79 y.o. male with a medical diagnosis of Closed fracture of right hip. Pt has a PMHx significant for:rheumatoid arthritis, osteoarthritis, osteopenia, hx of partial colon resection ("black spot on colon" - reportedly non cancerous), and newly diagnosed HTN who initially presented to [facility] s/p fall at home resulting in a right intertrochanteric femur fracture. Pt WBAT and ROMAT to RLE. Pt has R knee brace from home that he uses for ambulation. Pt's wife reports he lives c her in a SLH, ramp to enter, walkin shower c seat. Pt's wife reports he is only alone for short periods of time and she is always near by when he is completing bADL tasks. He presents with performance deficits affecting function including weakness, impaired endurance, impaired self care skills, impaired functional mobility, impaired balance, decreased coordination, decreased safety awareness, pain, impaired fine motor.    Rehab Prognosis: Good; patient would benefit from acute OT services to address these deficits and reach maximum level of function.    Plan:     Patient to be seen  (5-6x/week (QD-BID)) to address the above listed problems via self-care/home management, neuromuscular re-education, therapeutic activities, therapeutic exercises  Plan of Care Expires: 05/17/24  Plan of Care Reviewed with: patient, spouse    Subjective     Chief Complaint: RLE pain  Patient Comments/Goals: return home to [facility]  Pain/Comfort:  Location - Side 1: Right  Location - Orientation 1: lower  Location 1: hip  Pain " Addressed 1: Reposition, Distraction, Cessation of Activity    Patients cultural, spiritual, Roman Catholic conflicts given the current situation: no    Social History:  Living Environment: Patient lives with their spouse in a single story home with ramped and walk-in shower  Prior Level of Function: Prior to admission, patient was modified independent with ADLs using rollator for mobility. Pt's wife reports she provides SBA for all ADL tasks.  Roles and Routines: Patient was not driving and retired prior to admission.  Equipment Used at Home: bedside commode, wheelchair, walker, rolling, rollator, shower chair  DME owned (not currently used): none  Assistance Upon Discharge: family    Objective:     Communicated with NSG and physical therapy prior to session. Patient found HOB elevated with peripheral IV, bed alarm upon OT entry to room.    General Precautions: Standard, fall   Orthopedic Precautions: RLE weight bearing as tolerated (RLE ROMAT)   Braces:  (RLE knee brace)    Respiratory Status: Room air    Occupational Performance    Gait belt applied - Yes    Bed Mobility:   Supine to sit from left side of bed with minimum assistance  Sit to Supine with minimum assistance on L side of bed    Functional Mobility/Transfers:  Sit <> Stand Transfer with contact guard assistance with rolling walker  Functional Mobility: Pt ambulated ~25ft c RW    Activities of Daily Living:  Lower Body Dressing: maximal assistance  Toileting: contact guard assistance (+) void; standing c use of urinal    Cognitive/Visual Perceptual:  Cognitive/Psychosocial Skills:    -     Oriented to: Person, Place, and Situation    Physical Exam:  Dominant hand: Right  Upper Extremity Range of Motion:     -       Right Upper Extremity: WFL  -       Left Upper Extremity: WFL  Upper Extremity Strength:    -       Right Upper Extremity: WFL  -       Left Upper Extremity: WFL    AMPAC 6 Click ADL:  AMPAC Total Score: 19    Treatment & Education:  Therapist  provided facilitation and instruction of proper body mechanics, energy conservation, and fall prevention strategies during tasks listed above  Patient educated on role of OT, POC, and goals for therapy  Patient educated on importance of OOB activities with staff member assistance and sitting OOB majority of the day      Patient clear to stand pivot transfer with RN/PCT, assist Skip VELASCO .    Patient left HOB elevated with all lines intact, call button in reach, bed alarm on, and family present.    GOALS:   Multidisciplinary Problems       Occupational Therapy Goals          Problem: Occupational Therapy    Goal Priority Disciplines Outcome Interventions   Occupational Therapy Goal     OT, PT/OT Ongoing, Progressing    Description: Goals to be met by: 5/17/2024     Patient will increase functional independence with ADLs by performing:    LE Dressing with Stand-by Assistance.  Grooming while standing at sink with Stand-by Assistance.  Toileting from toilet with Stand-by Assistance for hygiene and clothing management.   Toilet transfer to toilet with Stand-by Assistance.                         History:     No past medical history on file.      Past Surgical History:   Procedure Laterality Date    INTRAMEDULLARY RODDING OF FEMUR Right 4/11/2024    Procedure: INSERTION, INTRAMEDULLARY ROSE MARY, FEMUR;  Surgeon: Stu Tatum MD;  Location: [facility];  Service: Orthopedics;  Laterality: Right;       Time Tracking:     OT Date of Treatment: 04/17/24  OT Start Time: 1135  OT Stop Time: 1154  OT Total Time (min): 19 min    Billable Minutes: Evaluation 19    4/17/2024

## 2024-04-17 NOTE — PLAN OF CARE
SS[facility] uploaded discharge orders/AVS and clinicals to [facility]. Report packet was given to nurse.

## 2024-04-17 NOTE — PLAN OF CARE
Problem: Adult Inpatient Plan of Care  Goal: Plan of Care Review  Outcome: Ongoing, Progressing  Flowsheets (Taken 4/17/2024 1249)  Plan of Care Reviewed With: patient  Goal: Patient-Specific Goal (Individualized)  Outcome: Ongoing, Progressing  Flowsheets (Taken 4/17/2024 1249)  Anxieties, Fears or Concerns: None expressed at this time  Individualized Care Needs: PT/OT evaluation, NPO except thin liquids, modified barium swallow study on 4/17, meds cushed with applesauce, infection control, and pain management  Goal: Absence of Hospital-Acquired Illness or Injury  Outcome: Ongoing, Progressing  Intervention: Identify and Manage Fall Risk  Flowsheets (Taken 4/17/2024 1249)  Safety Promotion/Fall Prevention:   assistive device/personal item within reach   bed alarm set   nonskid shoes/socks when out of bed   instructed to call staff for mobility   side rails raised x 3  Intervention: Prevent Skin Injury  Flowsheets (Taken 4/17/2024 1249)  Body Position: sitting up in bed  Skin Protection: tubing/devices free from skin contact  Intervention: Prevent and Manage VTE (Venous Thromboembolism) Risk  Flowsheets (Taken 4/17/2024 1249)  Activity Management: Step side-to-side along edge of bed - L3  VTE Prevention/Management:   bleeding precations maintained   bleeding risk assessed  Range of Motion: active ROM (range of motion) encouraged  Intervention: Prevent Infection  Flowsheets (Taken 4/17/2024 1249)  Infection Prevention:   cohorting utilized   environmental surveillance performed   equipment surfaces disinfected   hand hygiene promoted   personal protective equipment utilized   rest/sleep promoted   single patient room provided  Goal: Optimal Comfort and Wellbeing  Outcome: Ongoing, Progressing  Intervention: Monitor Pain and Promote Comfort  Flowsheets (Taken 4/17/2024 1249)  Pain Management Interventions:   pain management plan reviewed with patient/caregiver   pillow support provided   position adjusted   care  clustered   quiet environment facilitated  Intervention: Provide Person-Centered Care  Flowsheets (Taken 4/17/2024 1249)  Trust Relationship/Rapport:   care explained   choices provided   emotional support provided   empathic listening provided   questions answered   questions encouraged   reassurance provided   thoughts/feelings acknowledged  Goal: Readiness for Transition of Care  Outcome: Ongoing, Progressing  Intervention: Mutually Develop Transition Plan  Flowsheets (Taken 4/17/2024 1249)  Equipment Currently Used at Home:   shower chair   bedside commode   walker, rolling   walker, standard   cane, straight  Transportation Anticipated: family or friend will provide  Who are your caregiver(s) and their phone number(s)?: Eze Cisneros   Communicated LIZABETH with patient/caregiver: Date not available/Unable to determine  Do you expect to return to your current living situation?: Yes  Do you have help at home or someone to help you manage your care at home?: Yes  Readmission within 30 days?: No  Do you currently have service(s) that help you manage your care at home?: No     Problem: Infection  Goal: Absence of Infection Signs and Symptoms  Outcome: Ongoing, Progressing  Intervention: Prevent or Manage Infection  Flowsheets (Taken 4/17/2024 1249)  Fever Reduction/Comfort Measures:   lightweight bedding   lightweight clothing  Infection Management: aseptic technique maintained  Isolation Precautions:   precautions initiated   protective     Problem: Fall Injury Risk  Goal: Absence of Fall and Fall-Related Injury  Outcome: Ongoing, Progressing  Intervention: Identify and Manage Contributors  Flowsheets (Taken 4/17/2024 1249)  Self-Care Promotion:   independence encouraged   BADL personal objects within reach  Medication Review/Management: medications reviewed  Intervention: Promote Injury-Free Environment  Flowsheets (Taken 4/17/2024 1249)  Safety Promotion/Fall Prevention:   assistive device/personal item  within reach   bed alarm set   nonskid shoes/socks when out of bed   instructed to call staff for mobility   side rails raised x 3     Problem: Swallowing Impairment  Goal: Optimal Eating and Swallowing Without Aspiration  Outcome: Ongoing, Progressing  Intervention: Optimize Eating and Swallowing  Flowsheets (Taken 4/17/2024 1249)  Aspiration Precautions: upright posture maintained  Swallowing Interventions: Dysphagia: (NPO except thin liquids, meds crushed with applesauce) other (see comments)  Swallowing Method: (Modified Barium Swallow Study on 4/17/24) other (see comments)     Problem: Hypertension Comorbidity  Goal: Blood Pressure in Desired Range  Outcome: Ongoing, Progressing  Intervention: Maintain Blood Pressure Management  Flowsheets (Taken 4/17/2024 1249)  Medication Review/Management: medications reviewed     Problem: Osteoarthritis Comorbidity  Goal: Maintenance of Osteoarthritis Symptom Control  Outcome: Ongoing, Progressing  Intervention: Maintain Osteoarthritis Symptom Control  Flowsheets (Taken 4/17/2024 1249)  Activity Management: Step side-to-side along edge of bed - L3  Medication Review/Management: medications reviewed     Problem: Pain Chronic (Persistent) (Comorbidity Management)  Goal: Acceptable Pain Control and Functional Ability  Outcome: Ongoing, Progressing  Intervention: Develop Pain Management Plan  Flowsheets (Taken 4/17/2024 1249)  Pain Management Interventions:   pain management plan reviewed with patient/caregiver   pillow support provided   position adjusted   care clustered   quiet environment facilitated  Intervention: Manage Persistent Pain  Flowsheets (Taken 4/17/2024 1249)  Medication Review/Management: medications reviewed

## 2024-04-17 NOTE — PLAN OF CARE
Problem: Occupational Therapy  Goal: Occupational Therapy Goal  Description: Goals to be met by: 5/17/2024     Patient will increase functional independence with ADLs by performing:    LE Dressing with Stand-by Assistance.  Grooming while standing at sink with Stand-by Assistance.  Toileting from toilet with Stand-by Assistance for hygiene and clothing management.   Toilet transfer to toilet with Stand-by Assistance.    Outcome: Ongoing, Progressing
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
## 2024-04-17 NOTE — NURSING
Patient to be discharged to [facility]. Report was called at given to Sun Lui LPN. Patients admitting diagnosis as well as history was reviewed with nurse. Surgical interventions, weight bearing status, as well as dressing changes was also discussed with nurse. No additional questions were asked. IV's to remain in place per nurses request. Patient stable upon discharge.

## 2024-04-17 NOTE — PROGRESS NOTES
Patient Name: Terence Tanner Jr.  MRN: [id number]  Admission Date: 4/10/2024  Hospital Length of Stay: 7 days  Attending Provider: Derrek Rose MD  Primary Care Provider: Whitney, Primary Doctor  Follow-up For: Procedure(s) (LRB):  INSERTION, INTRAMEDULLARY ROSE MARY, FEMUR (Right)      Subjective:       Principal Orthopedic Problem: 6 Days Post-Op   IMN right IT fracture      Interval History:    Patient resting comfortably this morning. Family is at bedside. Awaiting placement.     Review of patient's allergies indicates:  No Known Allergies    Current Facility-Administered Medications   Medication Dose Route Frequency Provider Last Rate Last Admin    acetaminophen tablet 650 mg  650 mg Oral Q4H PRN Roland Lincoln MD   650 mg at 04/17/24 0629    aluminum-magnesium hydroxide-simethicone 200-200-20 mg/5 mL suspension 30 mL  30 mL Oral QID PRN Roland Lincoln MD        calcium-vitamin D3 500 mg-5 mcg (200 unit) per tablet 1 tablet  1 tablet Oral BID Derrek Rose MD   1 tablet at 04/17/24 0840    cloNIDine tablet 0.2 mg  0.2 mg Oral TID PRN Roland Lincoln MD   0.2 mg at 04/16/24 1202    enoxaparin injection 40 mg  40 mg Subcutaneous Daily Roland Lincoln MD   40 mg at 04/16/24 1644    heparin, porcine (PF) 100 unit/mL injection flush 500 Units  5 mL Intravenous On Call Procedure Roland Lincoln MD        hydrALAZINE injection 10 mg  10 mg Intravenous Q4H PRN Roland Lincoln MD        labetaloL injection 10 mg  10 mg Intravenous Q4H PRN Roland Lincoln MD   10 mg at 04/11/24 1142    lisinopriL tablet 40 mg  40 mg Oral Daily Derrek Rose MD   40 mg at 04/17/24 0840    melatonin tablet 6 mg  6 mg Oral Nightly PRN Roland Lincoln MD   6 mg at 04/16/24 2043    methocarbamoL tablet 500 mg  500 mg Oral TID Derrek Rose MD   500 mg at 04/17/24 0840    methylnaltrexone 12 mg/0.6 mL subcutaneous injection 12 mg  12 mg Subcutaneous Every other day Derrek Rose MD   12 mg at 04/15/24 0922    methylPREDNISolone tablet 4 mg  4 mg Oral Daily SalazarRoland  "MD   4 mg at 04/17/24 0839    ondansetron injection 4 mg  4 mg Intravenous Q4H PRN Roland Lincoln MD   4 mg at 04/13/24 1036    oxyCODONE immediate release tablet 5 mg  5 mg Oral Q4H PRN Humaira Ruelas PA-C        oxyCODONE immediate release tablet Tab 10 mg  10 mg Oral Q4H PRN Humaira Ruelas PA-C   10 mg at 04/16/24 1644    polyethylene glycol packet 17 g  17 g Oral BID PRN Roland Lincoln MD   17 g at 04/13/24 2036    polyethylene glycol packet 17 g  17 g Oral BID Derrek Rose MD   17 g at 04/15/24 0922    prochlorperazine injection Soln 5 mg  5 mg Intravenous Q6H PRN Roland Lincoln MD   5 mg at 04/12/24 0114    senna-docusate 8.6-50 mg per tablet 2 tablet  2 tablet Oral BID Derrek Rose MD   2 tablet at 04/17/24 0839    sodium chloride 0.9% flush 10 mL  10 mL Intravenous PRN Roland Lincoln MD         Objective:     Vital Signs (Most Recent):  Temp: 97.5 °F (36.4 °C) (04/17/24 0710)  Pulse: 74 (04/17/24 0710)  Resp: 16 (04/17/24 0318)  BP: (!) 154/87 (04/17/24 0710)  SpO2: 98 % (04/17/24 0710) Vital Signs (24h Range):  Temp:  [97.3 °F (36.3 °C)-97.8 °F (36.6 °C)] 97.5 °F (36.4 °C)  Pulse:  [66-94] 74  Resp:  [14-20] 16  SpO2:  [96 %-99 %] 98 %  BP: (126-173)/(56-88) 154/87     Weight: 70.3 kg (155 lb)  Height: 5' 9" (175.3 cm)  Body mass index is 22.89 kg/m².      Intake/Output Summary (Last 24 hours) at 4/17/2024 1021  Last data filed at 4/17/2024 0544  Gross per 24 hour   Intake 120 ml   Output 900 ml   Net -780 ml       Physical Exam:   Ortho/SPM Exam  General the patient is alert and oriented x3 no acute distress nontoxic-appearing appropriate affect.                      RLE: -Skin: Dressing with small serosanguinous drainage; No signs of new abrasions or lacerations, no scars,            -MSK: : Hip and Knee F/E, EHL/FHL, Gastroc/Tib anterior Strength 5/5; Tolerates full passive range of motion of the hip           -Neuro:  Sensation intact to light touch L3-S1 dermatomes           -Lymphatic: No " "signs of lymphadenopathy           -CV: Capillary refill is less than 2 seconds. DP pulse 2+ Compartments soft and compressible although does have moderate swelling.         Diagnostic Findings:   Significant Labs: BMP:   No results for input(s): "GLU", "NA", "K", "CL", "CO2", "BUN", "CREATININE", "CALCIUM", "MG" in the last 48 hours.    CBC:   No results for input(s): "WBC", "HGB", "HCT", "PLT" in the last 48 hours.    CMP:   No results for input(s): "NA", "K", "CL", "CO2", "GLU", "BUN", "CREATININE", "CALCIUM", "PROT", "ALBUMIN", "BILITOT", "ALKPHOS", "AST", "ALT", "ANIONGAP", "EGFRNONAA" in the last 48 hours.    Invalid input(s): "ESTGFAFR[place]"    All pertinent labs within the past 24 hours have been reviewed.        Significant Imaging: I have reviewed all pertinent imaging results/findings.     Assessment/Plan:     Vital signs stable.    -DVT ppx: Lovenox while in house, ASA 81 mg Bid upon d/c.   -WBAT to RLE, full ROM, work with therapy on mobilzing.   - Daily dry dressing changes to the right hip  -CM for Rehab referral as indicated.   - Follow up with Dr. Tatum in approx 2 weeks for wound check and repeat x rays.   -Ortho stable for DC at this time.  Please call with questions or concerns.     The above findings, diagnostics, and treatment plan were discussed with Dr. Baldwin who is in agreement with the plan of care except as stated in additional documentation.       Elida Barnhart FNP-C  [facility]   Orthopedic Trauma      "

## 2024-04-17 NOTE — PLAN OF CARE
04/17/24 1022   Final Note   Assessment Type Final Discharge Note   Anticipated Discharge Disposition SNF   Post-Acute Status   Post-Acute Authorization Placement   Post-Acute Placement Status Set-up Complete/Auth obtained   Discharge Delays None known at this time     Patient discharged to [facility], report was called to Sun. Transportation was arranged with [facility] transport van.

## 2024-04-17 NOTE — PLAN OF CARE
Patient has been accepted to [facility] swing bed. Plan to dc today, patient and provider made aware of acceptance.

## 2024-04-17 NOTE — PT/OT/SLP EVAL
"Speech Language Pathology Evaluation  Bedside Swallow    Patient Name:  Terence Tanner Jr.   MRN:  [id number]  Admitting Diagnosis: Closed fracture of right hip    Recommendations:                 General Recommendations:  Modified barium swallow study  Diet recommendations:   ,   NPO, except water and meds crushed in applesauce  Aspiration Precautions: Meds crushed in puree   General Precautions: Standard,    Communication strategies:  go to room if call light pushed    Assessment:     Terence Tanner Jr. is a 79 y.o. male with an SLP diagnosis of Dysphagia.  He presents with oropharyngeal dysphagia.    History:     No past medical history on file.    Past Surgical History:   Procedure Laterality Date    INTRAMEDULLARY RODDING OF FEMUR Right 4/11/2024    Procedure: INSERTION, INTRAMEDULLARY ROSE MARY, FEMUR;  Surgeon: Stu Tatum MD;  Location: [facility];  Service: Orthopedics;  Laterality: Right;       Social History: Patient lives with wife.    Prior Intubation HX:  N/A    Modified Barium Swallow: To be completed on 4/18/24    Chest X-Rays: Please see medical records    Prior diet: Pt/wife report that prior to admit foods had to have gravy and be very moist. Pt states that he spits in a cup prior to admit and was spiitting in a cup since admit to remove material.    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    Subjective     Pt seen at bedside with wife present.  Patient goals: Get better and go home.     Pain/Comfort:       Respiratory Status: Room air    Objective:     Oral Musculature Evaluation       Bedside Swallow Eval:   Consistencies Assessed:  Thin liquids    Puree        Oral Phase:   Pt wears dentures.  Oral motor strength and agility WFL    Pharyngeal Phase:   multiple spontaneous swallows  throat clearing  wet vocal quality after swallow  Pt produced multiple throat clearing and vocal quality continued to be wet/gargly with pudding.  Pt reported that pudding felt "stuck" and he was unable to clear completely. "   Nursing reports that pt choked on meds and pt/wife reports that he choked on scrambled eggs this morning.    Compensatory Strategies  Effortful swallow    Treatment: Modified Barium Swallow Study    Goals:    Complete Modified Barium Swallow Study       Plan:     Patient to be seen:  3-5x/week    Plan of Care expires:  5/17/24   Plan of Care reviewed with:   Patient, spouse, treatment team   SLP Follow-Up:     yes     Discharge recommendations:      Barriers to Discharge:  Level of Skilled Assistance Needed   and Safety Awareness      Time Tracking:     SLP Treatment Date:    4/17/24  Speech Start Time:   12:10 PM  Speech Stop Time:     12:30 PM   Speech Total Time (min):   20    Billable Minutes: Eval Swallow and Oral Function 20 04/17/2024

## 2024-04-17 NOTE — PROGRESS NOTES
"Inpatient Nutrition Assessment    Admit Date: 4/17/2024   Total duration of encounter: 1 day   Patient Age: 79 y.o.    Nutrition Recommendation/Prescription     Continue NPO except with meds crush applesauce. 2. Advanced diet to SLP diet modifications      Communication of Recommendations:       Nutrition Assessment     Malnutrition Assessment/Nutrition-Focused Physical Exam                                                                A minimum of two characteristics is recommended for diagnosis of either severe or non-severe malnutrition.    Chart Review    Reason Seen: continuous nutrition monitoring and length of stay    Malnutrition Screening Tool Results   Have you recently lost weight without trying?: Yes: 2-13 lbs  Have you been eating poorly because of a decreased appetite?: No   MST Score: 1   Diagnosis:  S/p fall at home resulting in a right intertrochanteric femur fracture   Post-op anemia   HTN  Rheumatoid arthritis, osteoarthritis    Relevant Medical History:  rheumatoid arthritis, osteoarthritis, osteopenia, hx of partial colon resection ("black spot on colon" - non cancerous)    Scheduled Medications:  calcium-vitamin D3, 1 tablet, BID  enoxparin, 40 mg, Daily  famotidine, 20 mg, BID  lisinopriL, 40 mg, Daily  methocarbamoL, 500 mg, TID  methylPREDNISolone, 4 mg, Daily  senna-docusate 8.6-50 mg, 2 tablet, BID    Continuous Infusions:   PRN Medications:   Current Facility-Administered Medications:     acetaminophen, 650 mg, Oral, Q4H PRN    albuterol-ipratropium, 3 mL, Nebulization, Q6H PRN    aluminum-magnesium hydroxide-simethicone, 30 mL, Oral, QID PRN    benzonatate, 100 mg, Oral, TID PRN    bisacodyL, 10 mg, Rectal, Daily PRN    calcium carbonate, 500 mg, Oral, TID PRN    guaiFENesin 100 mg/5 ml, 200 mg, Oral, Q4H PRN    hydrALAZINE, 10 mg, Intravenous, Q4H PRN    HYDROcodone-acetaminophen, 1 tablet, Oral, Q6H PRN    labetalol, 10 mg, Intravenous, QID PRN    melatonin, 9 mg, Oral, Nightly PRN    " "morphine, 2 mg, Intravenous, Q6H PRN    naloxone, 0.02 mg, Intravenous, PRN    ondansetron, 8 mg, Oral, Q8H PRN    ondansetron, 4 mg, Intravenous, Q8H PRN    oxyCODONE, 10 mg, Oral, Q6H PRN    polyethylene glycol, 17 g, Oral, TID PRN    simethicone, 1 tablet, Oral, QID PRN    sodium chloride 0.9%, 10 mL, Intravenous, PRN    Calorie Containing IV Medications: no significant kcals from medications at this time    Recent Labs   Lab 04/10/24  2102 04/11/24  0447 04/12/24  0616 04/14/24  0457    141 140 139   K 3.7 4.1 3.6 3.6   CALCIUM 9.5 9.2 8.6* 9.0   CHLORIDE 106 106 103 101   CO2 23 25 27 30   BUN 18.1 17.3 15.7 17.7   CREATININE 0.99 0.90 0.82 0.83   EGFRNORACEVR >60 >60 >60 >60   GLUCOSE 165* 150* 107 93   BILITOT 0.4  --  0.4 0.6   ALKPHOS 44  --  34* 32*   ALT 13  --  9 6   AST 14  --  17 18   ALBUMIN 3.3*  --  2.7* 2.4*   WBC 19.10* 9.96 13.34* 8.15   HGB 14.1 12.4* 10.4* 9.5*   HCT 42.1 37.4* 31.4* 28.9*     Nutrition Orders:  Diet NPO Except for: Medication (crush meds and place in applesauce), Other (Comment)      Appetite/Oral Intake: NPO/NPO  Factors Affecting Nutritional Intake: difficulty/impaired swallowing  Social Needs Impacting Access to Food: unable to assess at this time; will attempt on follow-up  Food/Religion/Cultural Preferences:     Food Allergies: none reported  Last Bowel Movement: 04/16/24  Wound(s):      Comments      Per MD notes. Patient complained of difficulty swallowing upon admission, he was evaluated by speech therapy and needs to remain NPO until a barium swallow is performed.  Does affirm that he has been compensating for difficulty swallowing at home for many years and has self modified his diet to easy to chew foods.     04/17/24: Pt c/o of difficulty swallowing. Pt NPO until barium swallow. Per MD pt follow easy to chew foods at home. Will follow up with SLP on diet modifications. Will complete NFPE on next follow up.    Anthropometrics    Height: 5' 9" (175.3 cm),  "   Last Weight: 66.2 kg (145 lb 15.1 oz) (04/17/24 1541), Weight Method: Bed Scale  BMI (Calculated): 21.5  BMI Classification: normal (BMI 18.5-24.9)        Ideal Body Weight (IBW), Male: 160 lb     % Ideal Body Weight, Male (lb): 91.22 %                 Usual Body Weight (UBW), kg:  (unable to obtain)        Usual Weight Provided By: unable to obtain usual weight    Wt Readings from Last 5 Encounters:   04/17/24 66.2 kg (145 lb 15.1 oz)   04/12/24 70.3 kg (155 lb)     Weight Change(s) Since Admission:   Wt Readings from Last 1 Encounters:   04/17/24 1541 66.2 kg (145 lb 15.1 oz)   04/17/24 1100 66.2 kg (145 lb 15.1 oz)   Admit Weight: 66.2 kg (145 lb 15.1 oz) (04/17/24 1100), Weight Method: Bed Scale    Estimated Needs    Weight Used For Calorie Calculations: 66.2 kg (145 lb 15.1 oz)  Energy Calorie Requirements (kcal): 1,655 kcal (25 kcal/kg/CBW)  Energy Need Method: Kcal/kg  Weight Used For Protein Calculations: 66.2 kg (145 lb 15.1 oz)  Protein Requirements: 66.34 gm (1.0 gm/kg/CBW)  Fluid Requirements (mL): 1,655 mL (1 mL/kcal)        Enteral Nutrition     Patient not receiving enteral nutrition at this time.    Parenteral Nutrition     Patient not receiving parenteral nutrition support at this time.    Evaluation of Received Nutrient Intake    Calories: not meeting estimated needs  Protein: not meeting estimated needs    Patient Education     Not applicable.    Nutrition Diagnosis     PES: Inadequate oral intake related to swallowing difficulty as evidenced by easy to chews at home, NPO. (new)         Nutrition Interventions     Intervention(s): collaboration with other providers    Goal: Consume % of meals/snacks by follow-up. (new)      Nutrition Goals & Monitoring     Dietitian will monitor: weight, weight change, electrolyte/renal panel, glucose/endocrine profile, and gastrointestinal profile  Discharge planning: too early to determine; pending clinical course  Nutrition Risk/Follow-Up: high  (follow-up in 1-4 days)   Please consult if re-assessment needed sooner.

## 2024-04-17 NOTE — DISCHARGE SUMMARY
[facility] Hospital Medicine Discharge Summary    Admit Date: 4/10/2024  Discharge Date and Time: 4/17/20248:34 AM  Admitting Physician: WILLAM Team  Discharging Physician: Karan Bernal MD.  Primary Care Physician: Whitney, Primary Doctor  Consults: Orthopedic Surgery    Discharge Diagnoses:  Closed right intertrochanteric femur fracture  Ground level fall  Hypertension-probably secondary to pain  Reactive leukocytosis/stress response  History osteoarthritis and rheumatoid arthritis on chronic methylprednisolone    Hospital Course:   79-year-old male with medical history of chronic rheumatoid arthritis, osteoarthritis with deformities on chronic methylprednisolone 4 mg daily present to the ED after a ground level fall with complaint of right hip pain.  Report he has significant arthritis and bowing at his knees and while trying to stand up he slipped and fell landing on his right side he immediately started having right hip pain and deformed hip.  Denies hitting his head or losing consciousness  On arrival to ED he was afebrile and hypertensive.  Labs notable for WBC 19.10, CMP unremarkable, urinalysis unremarkable.  Chest x-ray show no airspace disease, right hip x-ray showed right intertrochanteric femur fracture.  Orthopedic surgery consulted and planned for operative management tomorrow.  Referred to hospital medicine service for further evaluation and management.  He denies any history of heart disease or prior interventions, lung disease, or prior reaction to anesthesia.  Pending SNF placement.  Had good bowel movement yesterday.  Orthopedic surgery following.  Aspirin 81 b.i.d. on discharge.  Blood pressure improving, increase lisinopril to 40 mg  Continue methylprednisolone 4 mg daily per home dose.  calcium and vitamin-D tablet for chronic steroids.  Scheduled methocarbamol    PT and OT-  SNF  CM informed pt is accepted to [facility] and requested discharge orders   Pt is medically  cleared for discharge, asa 81mg BID   Pt was seen and examined on the day of discharge  Vitals:  VITAL SIGNS: 24 HRS MIN & MAX LAST   Temp  Min: 97.3 °F (36.3 °C)  Max: 97.8 °F (36.6 °C) 97.5 °F (36.4 °C)   BP  Min: 126/69  Max: 173/88 (!) 154/87   Pulse  Min: 66  Max: 94  74   Resp  Min: 14  Max: 20 16   SpO2  Min: 96 %  Max: 99 % 98 %       Physical Exam:  General: In no acute distress, afebrile  Chest: Clear to auscultation bilaterally  Heart: RRR, +S1, S2, no appreciable murmur  Abdomen: Soft, nontender, BS +  MSK: Warm, no lower extremity edema, no clubbing or cyanosis  Neurologic: Alert and oriented x4, Cranial nerve II-XII intact,     Recent Labs   Lab 04/11/24 0447 04/12/24  0616 04/14/24 0457   WBC 9.96 13.34* 8.15   RBC 4.12* 3.47* 3.16*   HGB 12.4* 10.4* 9.5*   HCT 37.4* 31.4* 28.9*   MCV 90.8 90.5 91.5   MCH 30.1 30.0 30.1   MCHC 33.2 33.1 32.9*   RDW 12.8 12.9 12.9    189 181   MPV 9.8 10.2 10.4       Recent Labs   Lab 04/10/24  2102 04/11/24 0447 04/12/24  0616 04/14/24 0457    141 140 139   K 3.7 4.1 3.6 3.6   CO2 23 25 27 30   BUN 18.1 17.3 15.7 17.7   CREATININE 0.99 0.90 0.82 0.83   CALCIUM 9.5 9.2 8.6* 9.0   ALBUMIN 3.3*  --  2.7* 2.4*   ALKPHOS 44  --  34* 32*   ALT 13  --  9 6   AST 14  --  17 18   BILITOT 0.4  --  0.4 0.6        Microbiology Results (last 7 days)       ** No results found for the last 168 hours. **             X-Ray Femur 2 View Right  Narrative: EXAMINATION:  XR FEMUR 2 VIEW RIGHT    CLINICAL HISTORY:  post op;    COMPARISON:  X-rays dated 04/10/2024    FINDINGS:  There is is improved alignment following internal flex a shin of the right femoral neck.  There are postoperative changes within the soft tissues.  Vascular calcifications are noted.  Impression: Improved alignment following internal fixation of the right femoral neck.    Electronically signed by: Mahi Crocker  Date:    04/11/2024  Time:    11:47  SURG FL Surgery Fluoro Usage  See OP Notes for  results.     IMPRESSION: See OP Notes for results.     This procedure was auto-finalized by: Virtual Radiologist  X-Ray Knee Complete 4 or More Views Left  Narrative: EXAMINATION:  XR KNEE COMP 4 OR MORE VIEWS LEFT    CLINICAL HISTORY:  pain;    COMPARISON:  None.    FINDINGS:  No acute displaced fractures or dislocations.    There is complete obliteration of the medial compartment of the knee joint with sclerosis marginal osteophytosis and subchondral cyst formation significant degenerative changes seen of the lateral compartment and the patellofemoral joint articular spaces otherwise preserved with smooth articular surfaces    No blastic or lytic lesions.    Small suprapatellar effusion identified  Impression: Tricompartmental degenerative changes.    Suprapatellar effusion.    Electronically signed by: Rachid Otero  Date:    04/11/2024  Time:    08:58  X-Ray Knee 1 or 2 View Right  Narrative: EXAMINATION:  XR KNEE 1 OR 2 VIEW RIGHT    CLINICAL HISTORY:  pain;    COMPARISON:  None.    FINDINGS:  No acute displaced fractures or dislocations.    There is complete obliteration of the medial compartment of the knee joint with sclerosis marginal osteophytosis and subchondral cyst formation significant degenerative changes also identified of the lateral compartment and the patellofemoral joint articular spaces are otherwise preserved with smooth articular surfaces    No blastic or lytic lesions.    Soft tissues within normal limits.  Impression: Tricompartmental degenerative changes.    Electronically signed by: Rachid Otero  Date:    04/11/2024  Time:    08:56         Medication List        START taking these medications      aspirin 81 MG EC tablet  Commonly known as: ECOTRIN  Take 1 tablet (81 mg total) by mouth 2 (two) times a day.     calcium-vitamin D3 500 mg-5 mcg (200 unit) per tablet  Commonly known as: OS-SARAI 500 + D3  Take 1 tablet by mouth 2 (two) times daily.     cloNIDine 0.2 MG tablet  Commonly  known as: CATAPRES  Take 1 tablet (0.2 mg total) by mouth 3 (three) times daily as needed (SBP > 160 (1st Choice if not NPO)).     lisinopriL 40 MG tablet  Commonly known as: PRINIVIL,ZESTRIL  Take 1 tablet (40 mg total) by mouth once daily.     methocarbamoL 500 MG Tab  Commonly known as: ROBAXIN  Take 1 tablet (500 mg total) by mouth 3 (three) times daily. for 10 days     oxyCODONE 10 mg Tab immediate release tablet  Commonly known as: ROXICODONE  Take 1 tablet (10 mg total) by mouth every 6 (six) hours as needed (severe).     polyethylene glycol 17 gram Pwpk  Commonly known as: GLYCOLAX  Take 17 g by mouth 2 (two) times daily as needed for Constipation ((Second Choice)).     senna-docusate 8.6-50 mg 8.6-50 mg per tablet  Commonly known as: PERICOLACE  Take 2 tablets by mouth 2 (two) times daily.            CONTINUE taking these medications      methylPREDNISolone 4 MG Tab  Commonly known as: MEDROL               Where to Get Your Medications        Information about where to get these medications is not yet available    Ask your nurse or doctor about these medications  aspirin 81 MG EC tablet  calcium-vitamin D3 500 mg-5 mcg (200 unit) per tablet  cloNIDine 0.2 MG tablet  lisinopriL 40 MG tablet  methocarbamoL 500 MG Tab  oxyCODONE 10 mg Tab immediate release tablet  polyethylene glycol 17 gram Pwpk  senna-docusate 8.6-50 mg 8.6-50 mg per tablet          Explained in detail to the patient about the discharge plan, medications, and follow-up visits. Pt understands and agrees with the treatment plan  Discharge Disposition:   [facility]    Discharged Condition: stable  Diet-   Dietary Orders (From admission, onward)       Start     Ordered    04/13/24 0748  Diet Heart Healthy  Diet effective now         04/13/24 0747                   Medications Per DC med rec  Activities as tolerated   Follow-up Information       Stu Tatum MD Follow up in 2 week(s).    Specialty: Orthopedic Surgery  Why: For suture  removal, For wound re-check  Contact information:  [address] [facility] [id number] [phone]               Please f/u with your pcp in 2 weeks Follow up.                           For further questions contact hospitalist office    Discharge time 34 minutes    For worsening symptoms, chest pain, shortness of breath, increased abdominal pain, high grade fever, stroke or stroke like symptoms, immediately go to the nearest Emergency Room or call 911 as soon as possible.    Portions of this note dictated using EMR integrated voice recognition software, and may be subject to voice recognition errors not corrected at proofreading. Please contact writer for clarification if needed    Karan Bernal MD  Department of Hospital Medicine   [facility]   04/17/2024 8:34 AM

## 2024-04-17 NOTE — PLAN OF CARE
Problem: Physical Therapy  Goal: Physical Therapy Goal  Description: Goals to be met by: Discharge      Patient will increase functional independence with mobility by performin. Sit to stand transfer with Modified [place]  2. Bed to chair transfer with Modified [place] using Rolling Walker  3. Gait  x at least 50 feet with Modified [place] and up to 175 feet with SBA using Rolling Walker.   4. Increased functional strength to 4/5 for R LE  5. Supine<>Sit with MOD I.    Outcome: Ongoing, Progressing      RN gave report to Abrahan Lakhani on [address], RN  05/27/20 4841

## 2024-04-17 NOTE — H&P
"[facility] MEDICINE - H&P ADMISSION NOTE    Patient Name: Terence Tanner Jr.  MRN: [id number]  Patient Class: IP- Swing   Admission Date: 4/17/2024   Admitting Physician: WILLAM Service   Attending Physician: JOEY Brooks  Primary Care Provider: Whitney Primary Doctor  Face-to-Face encounter date: 04/17/2024      CHIEF COMPLAINT     No chief complaint on file.    HISTORY OF PRESENTING ILLNESS     Patient is 79 years old male with a past medical history of rheumatoid arthritis, osteoarthritis, osteopenia, hx of partial colon resection ("black spot on colon" - reportedly non cancerous), and newly diagnosed HTN who initially presented to [facility] s/p fall at home resulting in a right intertrochanteric femur fracture. Patient subsequently underwent IM nailing of right intertrochanteric femur fracture on 04/11/2024 with Dr. Tatum.  Patient was noted to have elevated blood pressure readings during admission therefore was initiated on lisinopril, currently receiving 40 mg daily. Patient was transferred to our swing unit for continued therapy.  On exam today, patient denies any complaints.  He does endorse an episode of choking while eating breakfast this morning but contributes this to the dry food.  Will obtain SLP eval prior to initiating diet.  At baseline, patient does use an assistive device to walk, mainly Rollator.  He also has a powered wheelchair, a walker, a ramp at home, and lives with his wife who does assist with ADLs as needed.    PAST MEDICAL HISTORY     No past medical history on file.    PAST SURGICAL HISTORY     Past Surgical History:   Procedure Laterality Date    INTRAMEDULLARY RODDING OF FEMUR Right 4/11/2024    Procedure: INSERTION, INTRAMEDULLARY ROSE MARY, FEMUR;  Surgeon: Stu Tatum MD;  Location: [facility];  Service: Orthopedics;  Laterality: Right;     FAMILY HISTORY     Reviewed and noncontributory to this case    SOCIAL HISTORY     Social History "     Socioeconomic History    Marital status:      Social Determinants of Health     Food Insecurity: Unknown (4/11/2024)    Hunger Vital Sign     Worried About Running Out of Food in the Last Year: Never true   Transportation Needs: Unknown (4/11/2024)    PRAPARE - Transportation     Lack of Transportation (Medical): No   Housing Stability: Unknown (4/11/2024)    Housing Stability Vital Sign     Unable to Pay for Housing in the Last Year: No     HOME MEDICATIONS     Prior to Admission medications    Medication Sig Start Date End Date Taking? Authorizing Provider   aspirin (ECOTRIN) 81 MG EC tablet Take 1 tablet (81 mg total) by mouth 2 (two) times a day. 4/17/24   Karan Bernal MD   calcium-vitamin D3 (OS-SARAI 500 + D3) 500 mg-5 mcg (200 unit) per tablet Take 1 tablet by mouth 2 (two) times daily. 4/17/24 4/17/25  Karan Bernal MD   cloNIDine (CATAPRES) 0.2 MG tablet Take 1 tablet (0.2 mg total) by mouth 3 (three) times daily as needed (SBP > 160 (1st Choice if not NPO)). 4/17/24   Karan Bernal MD   lisinopriL (PRINIVIL,ZESTRIL) 40 MG tablet Take 1 tablet (40 mg total) by mouth once daily. 4/17/24   Karan Bernal MD   methocarbamoL (ROBAXIN) 500 MG Tab Take 1 tablet (500 mg total) by mouth 3 (three) times daily. for 10 days 4/17/24 4/27/24  Karan Bernal MD   methylPREDNISolone (MEDROL) 4 MG Tab Take 4 mg by mouth once daily. 3/18/24   Provider, Historical   oxyCODONE (ROXICODONE) 10 mg Tab immediate release tablet Take 1 tablet (10 mg total) by mouth every 6 (six) hours as needed (severe). 4/17/24   Karan Bernal MD   polyethylene glycol (GLYCOLAX) 17 gram PwPk Take 17 g by mouth 2 (two) times daily as needed for Constipation ((Second Choice)). 4/17/24   Karan Bernal MD   senna-docusate 8.6-50 mg (PERICOLACE) 8.6-50 mg per tablet Take 2 tablets by mouth 2 (two) times daily. 4/17/24   Karan Bernal MD     ALLERGIES     Patient has no known allergies.    REVIEW OF SYSTEMS     Except as documented above, all other  "systems reviewed and negative    PHYSICAL EXAM     There were no vitals filed for this visit.     General:  In no acute distress, resting comfortably  Head and neck:  Atraumatic, normocephalic, moist mucous membranes  Chest:  Clear to auscultation bilaterally  Heart:  S1, S2, no appreciable murmur  Abdomen:  Soft, nontender, BS +  MSK:  Warm, no lower extremity edema, no clubbing or cyanosis  Neuro:  Alert and oriented x4, moving all extremities with good strength  Integumentary:  Right hip incision without any erythema or drainage   Psychiatry:  Appropriate mood and affect    ASSESSMENT AND PLAN     S/p fall at home resulting in a right intertrochanteric femur fracture  Underwent IM nailing of right intertrochanteric femur fracture on 04/11/2024 with Dr. Tatum  WBAT to RLE, full ROM   Lovenox for DVT prevention, and orthopedics recommends aspirin 81 mg b.i.d. upon discharge, but patient is allergic.  PT/OT eval   Follow-up with Dr. Tatum on 04/30/2024 at 0800    Post-op anemia   Repeat labs in AM    HTN   Reports newly diagnosed   Currently receiving Lisinopril 40mg QD      Rheumatoid arthritis, osteoarthritis  Continue home methylprednisone 4 mg daily  PPI during admission    DVT prophylaxis: Lovenox   __________________________________________________________________  LABS/MICRO/MEDS/DIAGNOSTICS     LABS  No results for input(s): "NA", "K", "CHLORIDE", "CO2", "BUN", "CREATININE", "GLUCOSE", "CALCIUM", "ALKPHOS", "AST", "ALT", "ALBUMIN" in the last 72 hours.  No results for input(s): "WBC", "RBC", "HCT", "MCV", "PLT" in the last 72 hours.    Invalid input(s): "HG"    MICROBIOLOGY  Microbiology Results (last 7 days)       ** No results found for the last 168 hours. **          MEDICATIONS  Current Facility-Administered Medications   Medication Dose Route Frequency Provider Last Rate Last Admin    acetaminophen tablet 650 mg  650 mg Oral Q4H PRN Bollich, Lilian W, PA        albuterol-ipratropium 2.5 mg-0.5 mg/3 mL " nebulizer solution 3 mL  3 mL Nebulization Q6H PRN Lilian Estrada, PA        aluminum-magnesium hydroxide-simethicone 200-200-20 mg/5 mL suspension 30 mL  30 mL Oral QID PRN Lilian Estrada PA        benzonatate capsule 100 mg  100 mg Oral TID PRN Lilian Estrada PA        bisacodyL suppository 10 mg  10 mg Rectal Daily PRN Lilian Estrada PA        calcium carbonate 200 mg calcium (500 mg) chewable tablet 500 mg  500 mg Oral TID PRN Lilian Estrada PA        calcium-vitamin D3 500 mg-5 mcg (200 unit) per tablet 1 tablet  1 tablet Oral BID Lilian Estrada PA        enoxaparin injection 40 mg  40 mg Subcutaneous Daily Lilian Estrada PA        guaiFENesin 100 mg/5 ml syrup 200 mg  200 mg Oral Q4H PRN Lilian Estrada PA        hydrALAZINE injection 10 mg  10 mg Intravenous Q4H PRN Lilian Estrada PA        HYDROcodone-acetaminophen 5-325 mg per tablet 1 tablet  1 tablet Oral Q6H PRN Lilian Estrada PA        labetalol 20 mg/4 mL (5 mg/mL) IV syring  10 mg Intravenous QID PRN Lilian Estrada PA        lisinopriL tablet 40 mg  40 mg Oral Daily Lilian Estrada PA        melatonin tablet 9 mg  9 mg Oral Nightly PRN Lilian Estrada PA        methocarbamoL tablet 500 mg  500 mg Oral TID Lilian Estrada PA        methylPREDNISolone tablet 4 mg  4 mg Oral Daily Lilian Estrada PA        morphine injection 2 mg  2 mg Intravenous Q6H PRN Lilian Estrada PA        naloxone 0.4 mg/mL injection 0.02 mg  0.02 mg Intravenous PRN Lilian Estrada PA        ondansetron disintegrating tablet 8 mg  8 mg Oral Q8H PRN Lilian Estrada PA        ondansetron injection 4 mg  4 mg Intravenous Q8H PRN Lilian Estrada, PA        oxyCODONE immediate release tablet Tab 10 mg  10 mg Oral Q6H PRN Lilian Estrada PA        polyethylene glycol packet 17 g  17 g Oral TID PRN Lilian Estrada, PA        senna-docusate 8.6-50 mg per tablet 2 tablet  2 tablet Oral BID Lilian Estrada PA        simethicone chewable tablet 80 mg  1 tablet  Oral QID PRN Lilian Estrada, PA        sodium chloride 0.9% flush 10 mL  10 mL Intravenous PRN Lilian Estrada, PA          INFUSIONS  Current Facility-Administered Medications   Medication Dose Route Frequency Provider Last Rate Last Admin    acetaminophen tablet 650 mg  650 mg Oral Q4H PRN Lilian Estrada, PA        albuterol-ipratropium 2.5 mg-0.5 mg/3 mL nebulizer solution 3 mL  3 mL Nebulization Q6H PRN Lilian Estrada PA        aluminum-magnesium hydroxide-simethicone 200-200-20 mg/5 mL suspension 30 mL  30 mL Oral QID PRN Lilian Estrada PA        benzonatate capsule 100 mg  100 mg Oral TID PRN Lilian Estrada PA        bisacodyL suppository 10 mg  10 mg Rectal Daily PRN Lilian Estrada, PA        calcium carbonate 200 mg calcium (500 mg) chewable tablet 500 mg  500 mg Oral TID PRN Lilian Estrada PA        calcium-vitamin D3 500 mg-5 mcg (200 unit) per tablet 1 tablet  1 tablet Oral BID Lilian Estrada, PA        enoxaparin injection 40 mg  40 mg Subcutaneous Daily Lilian Estrada PA        guaiFENesin 100 mg/5 ml syrup 200 mg  200 mg Oral Q4H PRN Lilian Estrada PA        hydrALAZINE injection 10 mg  10 mg Intravenous Q4H PRN Lilian Estrada PA        HYDROcodone-acetaminophen 5-325 mg per tablet 1 tablet  1 tablet Oral Q6H PRN Lilian Estrada PA        labetalol 20 mg/4 mL (5 mg/mL) IV syring  10 mg Intravenous QID PRN Lilian Estrada PA        lisinopriL tablet 40 mg  40 mg Oral Daily Lilian Estrada PA        melatonin tablet 9 mg  9 mg Oral Nightly PRN Lilian Estrada PA        methocarbamoL tablet 500 mg  500 mg Oral TID Lilian Estrada PA        methylPREDNISolone tablet 4 mg  4 mg Oral Daily Lilian Estrada PA        morphine injection 2 mg  2 mg Intravenous Q6H PRN Lilian Estrada PA        naloxone 0.4 mg/mL injection 0.02 mg  0.02 mg Intravenous PRN Lilian Estrada PA        ondansetron disintegrating tablet 8 mg  8 mg Oral Q8H PRN Lilian Estrada PA        ondansetron injection 4  mg  4 mg Intravenous Q8H PRN Lilian Estrada PA        oxyCODONE immediate release tablet Tab 10 mg  10 mg Oral Q6H PRN Lilian Estrada PA        polyethylene glycol packet 17 g  17 g Oral TID PRN Lilian Estrada PA        senna-docusate 8.6-50 mg per tablet 2 tablet  2 tablet Oral BID Lilian Estrada PA        simethicone chewable tablet 80 mg  1 tablet Oral QID PRN Lilian Estrada PA        sodium chloride 0.9% flush 10 mL  10 mL Intravenous PRN Lilian Estrada PA         DIAGNOSTIC TESTS  No orders to display      Patient information was obtained from patient, patient's family, past medical records and ER records.   All diagnosis and differential diagnosis have been reviewed; assessment and plan has been documented. I have personally reviewed the labs and test results that are presently available; I have reviewed the patients medication list. I have reviewed the consulting providers response and recommendations. I have reviewed or attempted to review medical records based upon their availability.  All of the patient's questions have been addressed and answered. Patient's is agreeable to the above stated plan. I will continue to monitor closely and make adjustments to medical management as needed.  This note was created using [employer] voice recognition software that occasionally misinterpreted phrases or words.  Please contact me if any questions may rise regarding documentation to clarify verbiage.      JOEY Brooks   Internal Medicine  Department of [facility]  [facility]

## 2024-04-18 LAB
ANION GAP SERPL CALC-SCNC: 9 MEQ/L
BASOPHILS # BLD AUTO: 0.03 X10(3)/MCL
BASOPHILS NFR BLD AUTO: 0.3 %
BUN SERPL-MCNC: 14.6 MG/DL (ref 8.4–25.7)
CALCIUM SERPL-MCNC: 8.9 MG/DL (ref 8.8–10)
CHLORIDE SERPL-SCNC: 103 MMOL/L (ref 98–107)
CO2 SERPL-SCNC: 28 MMOL/L (ref 23–31)
CREAT SERPL-MCNC: 0.74 MG/DL (ref 0.73–1.18)
CREAT/UREA NIT SERPL: 20
EOSINOPHIL # BLD AUTO: 0.2 X10(3)/MCL (ref 0–0.9)
EOSINOPHIL NFR BLD AUTO: 2.3 %
ERYTHROCYTE [DISTWIDTH] IN BLOOD BY AUTOMATED COUNT: 13.2 % (ref 11.5–17)
GFR SERPLBLD CREATININE-BSD FMLA CKD-EPI: >60 MLS/MIN/1.73/M2
GLUCOSE SERPL-MCNC: 94 MG/DL (ref 82–115)
HCT VFR BLD AUTO: 32 % (ref 42–52)
HGB BLD-MCNC: 10.6 G/DL (ref 14–18)
IMM GRANULOCYTES # BLD AUTO: 0.14 X10(3)/MCL (ref 0–0.04)
IMM GRANULOCYTES NFR BLD AUTO: 1.6 %
LYMPHOCYTES # BLD AUTO: 1.64 X10(3)/MCL (ref 0.6–4.6)
LYMPHOCYTES NFR BLD AUTO: 18.7 %
MCH RBC QN AUTO: 30.5 PG (ref 27–31)
MCHC RBC AUTO-ENTMCNC: 33.1 G/DL (ref 33–36)
MCV RBC AUTO: 92.2 FL (ref 80–94)
MONOCYTES # BLD AUTO: 0.83 X10(3)/MCL (ref 0.1–1.3)
MONOCYTES NFR BLD AUTO: 9.5 %
NEUTROPHILS # BLD AUTO: 5.92 X10(3)/MCL (ref 2.1–9.2)
NEUTROPHILS NFR BLD AUTO: 67.6 %
PLATELET # BLD AUTO: 251 X10(3)/MCL (ref 130–400)
PMV BLD AUTO: 10.2 FL (ref 7.4–10.4)
POTASSIUM SERPL-SCNC: 3.8 MMOL/L (ref 3.5–5.1)
RBC # BLD AUTO: 3.47 X10(6)/MCL (ref 4.7–6.1)
SODIUM SERPL-SCNC: 140 MMOL/L (ref 136–145)
WBC # SPEC AUTO: 8.76 X10(3)/MCL (ref 4.5–11.5)

## 2024-04-18 PROCEDURE — 97535 SELF CARE MNGMENT TRAINING: CPT

## 2024-04-18 PROCEDURE — 25000003 PHARM REV CODE 250: Performed by: STUDENT IN AN ORGANIZED HEALTH CARE EDUCATION/TRAINING PROGRAM

## 2024-04-18 PROCEDURE — 92611 MOTION FLUOROSCOPY/SWALLOW: CPT

## 2024-04-18 PROCEDURE — 25000003 PHARM REV CODE 250: Performed by: PHYSICIAN ASSISTANT

## 2024-04-18 PROCEDURE — 80048 BASIC METABOLIC PNL TOTAL CA: CPT | Performed by: STUDENT IN AN ORGANIZED HEALTH CARE EDUCATION/TRAINING PROGRAM

## 2024-04-18 PROCEDURE — 99900035 HC TECH TIME PER 15 MIN (STAT)

## 2024-04-18 PROCEDURE — 97530 THERAPEUTIC ACTIVITIES: CPT

## 2024-04-18 PROCEDURE — 97110 THERAPEUTIC EXERCISES: CPT

## 2024-04-18 PROCEDURE — 63600175 PHARM REV CODE 636 W HCPCS: Performed by: PHYSICIAN ASSISTANT

## 2024-04-18 PROCEDURE — 94760 N-INVAS EAR/PLS OXIMETRY 1: CPT

## 2024-04-18 PROCEDURE — 11000004 HC SNF PRIVATE

## 2024-04-18 PROCEDURE — 85025 COMPLETE CBC W/AUTO DIFF WBC: CPT | Performed by: STUDENT IN AN ORGANIZED HEALTH CARE EDUCATION/TRAINING PROGRAM

## 2024-04-18 RX ORDER — ACETAMINOPHEN 325 MG/1
325 TABLET ORAL EVERY 4 HOURS PRN
Status: DISCONTINUED | OUTPATIENT
Start: 2024-04-18 | End: 2024-04-19

## 2024-04-18 RX ORDER — METHYLPREDNISOLONE 4 MG/1
4 TABLET ORAL DAILY
Status: COMPLETED | OUTPATIENT
Start: 2024-04-18 | End: 2024-04-20

## 2024-04-18 RX ORDER — CODEINE PHOSPHATE AND GUAIFENESIN 10; 100 MG/5ML; MG/5ML
5 SOLUTION ORAL EVERY 4 HOURS PRN
Status: DISCONTINUED | OUTPATIENT
Start: 2024-04-18 | End: 2024-04-18

## 2024-04-18 RX ORDER — ACETAMINOPHEN AND CODEINE PHOSPHATE 300; 30 MG/1; MG/1
1 TABLET ORAL EVERY 4 HOURS PRN
Status: DISCONTINUED | OUTPATIENT
Start: 2024-04-19 | End: 2024-04-24

## 2024-04-18 RX ORDER — CODEINE PHOSPHATE AND GUAIFENESIN 10; 100 MG/5ML; MG/5ML
5 SOLUTION ORAL EVERY 4 HOURS PRN
Status: DISCONTINUED | OUTPATIENT
Start: 2024-04-18 | End: 2024-04-19

## 2024-04-18 RX ADMIN — Medication 1 TABLET: at 10:04

## 2024-04-18 RX ADMIN — LISINOPRIL 40 MG: 20 TABLET ORAL at 10:04

## 2024-04-18 RX ADMIN — METHOCARBAMOL 500 MG: 500 TABLET ORAL at 10:04

## 2024-04-18 RX ADMIN — SENNOSIDES AND DOCUSATE SODIUM 2 TABLET: 8.6; 5 TABLET ORAL at 10:04

## 2024-04-18 RX ADMIN — GUAIFENESIN AND CODEINE PHOSPHATE 5 ML: 100; 10 SOLUTION ORAL at 10:04

## 2024-04-18 RX ADMIN — FAMOTIDINE 20 MG: 20 TABLET, FILM COATED ORAL at 08:04

## 2024-04-18 RX ADMIN — ACETAMINOPHEN 325 MG: 325 TABLET ORAL at 10:04

## 2024-04-18 RX ADMIN — ACETAMINOPHEN 325 MG: 325 TABLET ORAL at 02:04

## 2024-04-18 RX ADMIN — METHOCARBAMOL 500 MG: 500 TABLET ORAL at 08:04

## 2024-04-18 RX ADMIN — GUAIFENESIN AND CODEINE PHOSPHATE 5 ML: 100; 10 SOLUTION ORAL at 08:04

## 2024-04-18 RX ADMIN — ENOXAPARIN SODIUM 40 MG: 40 INJECTION SUBCUTANEOUS at 05:04

## 2024-04-18 RX ADMIN — METHOCARBAMOL 500 MG: 500 TABLET ORAL at 02:04

## 2024-04-18 RX ADMIN — METHYLPREDNISOLONE 4 MG: 4 TABLET ORAL at 10:04

## 2024-04-18 RX ADMIN — SENNOSIDES AND DOCUSATE SODIUM 2 TABLET: 8.6; 5 TABLET ORAL at 08:04

## 2024-04-18 RX ADMIN — Medication 1 TABLET: at 08:04

## 2024-04-18 RX ADMIN — GUAIFENESIN AND CODEINE PHOSPHATE 5 ML: 100; 10 SOLUTION ORAL at 02:04

## 2024-04-18 RX ADMIN — ACETAMINOPHEN 325 MG: 325 TABLET ORAL at 08:04

## 2024-04-18 RX ADMIN — FAMOTIDINE 20 MG: 20 TABLET, FILM COATED ORAL at 10:04

## 2024-04-18 NOTE — PLAN OF CARE
Problem: Adult Inpatient Plan of Care  Goal: Plan of Care Review  Outcome: Ongoing, Progressing  Flowsheets (Taken 4/18/2024 1121)  Plan of Care Reviewed With:   patient   spouse  Goal: Patient-Specific Goal (Individualized)  Outcome: Ongoing, Progressing  Flowsheets (Taken 4/18/2024 1121)  Anxieties, Fears or Concerns: Right leg pain  Individualized Care Needs: pain management, NPO pending MBS results, [place] [facility] crushed in applesauce  Goal: Absence of Hospital-Acquired Illness or Injury  Outcome: Ongoing, Progressing  Intervention: Identify and Manage Fall Risk  Flowsheets (Taken 4/18/2024 1121)  Safety Promotion/Fall Prevention:   assistive device/personal item within reach   bed alarm set   medications reviewed   nonskid shoes/socks when out of bed   side rails raised x 3   instructed to call staff for mobility  Intervention: Prevent Skin Injury  Flowsheets (Taken 4/18/2024 1121)  Body Position:   position changed independently   log-rolled   weight shifting   sitting up in bed   supine  Skin Protection:   adhesive use limited   incontinence pads utilized   transparent dressing maintained   tubing/devices free from skin contact  Intervention: Prevent and Manage VTE (Venous Thromboembolism) Risk  Flowsheets (Taken 4/18/2024 1121)  Activity Management: Rolling - L1  VTE Prevention/Management:   bleeding precations maintained   bleeding risk assessed  Intervention: Prevent Infection  Flowsheets (Taken 4/18/2024 1121)  Infection Prevention:   cohorting utilized   environmental surveillance performed   equipment surfaces disinfected   hand hygiene promoted   single patient room provided   rest/sleep promoted  Goal: Optimal Comfort and Wellbeing  Outcome: Ongoing, Progressing  Intervention: Monitor Pain and Promote Comfort  Flowsheets (Taken 4/18/2024 1121)  Pain Management Interventions:   care clustered   pillow support provided   position adjusted   medication offered  Intervention:  Provide Person-Centered Care  Flowsheets (Taken 4/18/2024 1121)  Trust Relationship/Rapport:   care explained   questions answered   questions encouraged  Goal: Readiness for Transition of Care  Outcome: Ongoing, Progressing     Problem: Infection  Goal: Absence of Infection Signs and Symptoms  Outcome: Ongoing, Progressing  Intervention: Prevent or Manage Infection  Flowsheets (Taken 4/18/2024 1121)  Infection Management: aseptic technique maintained     Problem: Fall Injury Risk  Goal: Absence of Fall and Fall-Related Injury  Outcome: Ongoing, Progressing  Intervention: Identify and Manage Contributors  Flowsheets (Taken 4/18/2024 1121)  Self-Care Promotion:   independence encouraged   BADL personal objects within reach   meal set-up provided   safe use of adaptive equipment encouraged  Medication Review/Management: medications reviewed  Intervention: Promote Injury-Free Environment  Flowsheets (Taken 4/18/2024 1121)  Safety Promotion/Fall Prevention:   assistive device/personal item within reach   bed alarm set   medications reviewed   nonskid shoes/socks when out of bed   side rails raised x 3   instructed to call staff for mobility     Problem: Swallowing Impairment  Goal: Optimal Eating and Swallowing Without Aspiration  Outcome: Ongoing, Progressing  Intervention: Optimize Eating and Swallowing  Flowsheets (Taken 4/18/2024 1121)  Aspiration Precautions: awake/alert before oral intake     Problem: Hypertension Comorbidity  Goal: Blood Pressure in Desired Range  Outcome: Ongoing, Progressing  Intervention: Maintain Blood Pressure Management  Flowsheets (Taken 4/18/2024 1121)  Medication Review/Management: medications reviewed     Problem: Osteoarthritis Comorbidity  Goal: Maintenance of Osteoarthritis Symptom Control  Outcome: Ongoing, Progressing  Intervention: Maintain Osteoarthritis Symptom Control  Flowsheets (Taken 4/18/2024 1121)  Activity Management: Rolling - L1  Medication Review/Management:  medications reviewed     Problem: Pain Chronic (Persistent) (Comorbidity Management)  Goal: Acceptable Pain Control and Functional Ability  Outcome: Ongoing, Progressing  Intervention: Develop Pain Management Plan  Flowsheets (Taken 4/18/2024 1121)  Pain Management Interventions:   care clustered   pillow support provided   position adjusted   medication offered  Intervention: Manage Persistent Pain  Flowsheets (Taken 4/18/2024 1121)  Bowel Elimination Promotion:   adequate fluid intake promoted   ambulation promoted  Medication Review/Management: medications reviewed

## 2024-04-18 NOTE — PLAN OF CARE
Problem: Adult Inpatient Plan of Care  Goal: Plan of Care Review  Outcome: Ongoing, Progressing  Flowsheets (Taken 4/17/2024 1919)  Plan of Care Reviewed With:   patient   spouse  Goal: Patient-Specific Goal (Individualized)  Outcome: Ongoing, Progressing  Flowsheets (Taken 4/17/2024 1919)  Anxieties, Fears or Concerns: Consistent right leg pain  Individualized Care Needs: Pan management, NPO pending MBS in AM, Safety with transfers to [facility], Safety with med admin  Goal: Absence of Hospital-Acquired Illness or Injury  Outcome: Ongoing, Progressing  Intervention: Identify and Manage Fall Risk  Flowsheets (Taken 4/17/2024 1919)  Safety Promotion/Fall Prevention:   assistive device/personal item within reach   bed alarm set   muscle strengthening facilitated   nonskid shoes/socks when out of bed   lighting adjusted   medications reviewed   room near unit station  Intervention: Prevent Skin Injury  Flowsheets (Taken 4/17/2024 1919)  Body Position: sitting up in bed  Skin Protection:   adhesive use limited   incontinence pads utilized   skin-to-device areas padded   transparent dressing maintained   skin-to-skin areas padded   tubing/devices free from skin contact  Intervention: Prevent and Manage VTE (Venous Thromboembolism) Risk  Flowsheets (Taken 4/17/2024 1919)  Activity Management: Walk with assistive devise and /or staff member - L3  VTE Prevention/Management:   bleeding risk assessed   bleeding precations maintained   fluids promoted   ROM (active) performed  Range of Motion: active ROM (range of motion) encouraged  Intervention: Prevent Infection  Flowsheets (Taken 4/17/2024 1919)  Infection Prevention:   cohorting utilized   hand hygiene promoted   single patient room provided  Goal: Optimal Comfort and Wellbeing  Outcome: Ongoing, Progressing  Intervention: Monitor Pain and Promote Comfort  Flowsheets (Taken 4/17/2024 1919)  Pain Management Interventions:   diversional activity provided   pillow support  provided   position adjusted   quiet environment facilitated  Intervention: Provide Person-Centered Care  Flowsheets (Taken 4/17/2024 1919)  Trust Relationship/Rapport:   care explained   choices provided   questions answered   thoughts/feelings acknowledged     Problem: Infection  Goal: Absence of Infection Signs and Symptoms  Outcome: Ongoing, Progressing  Intervention: Prevent or Manage Infection  Flowsheets (Taken 4/17/2024 1919)  Fever Reduction/Comfort Measures:   lightweight bedding   lightweight clothing  Infection Management: aseptic technique maintained  Isolation Precautions:   protective   precautions maintained     Problem: Fall Injury Risk  Goal: Absence of Fall and Fall-Related Injury  Outcome: Ongoing, Progressing  Intervention: Identify and Manage Contributors  Flowsheets (Taken 4/17/2024 1919)  Self-Care Promotion:   BADL personal objects within reach   BADL personal routines maintained   safe use of adaptive equipment encouraged  Medication Review/Management: medications reviewed  Intervention: Promote Injury-Free Environment  Flowsheets (Taken 4/17/2024 1919)  Safety Promotion/Fall Prevention:   assistive device/personal item within reach   bed alarm set   muscle strengthening facilitated   nonskid shoes/socks when out of bed   lighting adjusted   medications reviewed   room near unit station     Problem: Swallowing Impairment  Goal: Optimal Eating and Swallowing Without Aspiration  Outcome: Ongoing, Progressing  Intervention: Optimize Eating and Swallowing  Flowsheets (Taken 4/17/2024 1919)  Aspiration Precautions:   liquids thickened   NPO pending swallow screening/evaluation     Problem: Hypertension Comorbidity  Goal: Blood Pressure in Desired Range  Outcome: Ongoing, Progressing  Intervention: Maintain Blood Pressure Management  Flowsheets (Taken 4/17/2024 1919)  Medication Review/Management: medications reviewed     Problem: Osteoarthritis Comorbidity  Goal: Maintenance of Osteoarthritis  Symptom Control  Outcome: Ongoing, Progressing  Intervention: Maintain Osteoarthritis Symptom Control  Flowsheets (Taken 4/17/2024 1919)  Activity Management: Walk with assistive devise and /or staff member - L3  Medication Review/Management: medications reviewed     Problem: Pain Chronic (Persistent) (Comorbidity Management)  Goal: Acceptable Pain Control and Functional Ability  Outcome: Ongoing, Progressing  Intervention: Develop Pain Management Plan  Flowsheets (Taken 4/17/2024 1919)  Pain Management Interventions:   diversional activity provided   pillow support provided   position adjusted   quiet environment facilitated  Intervention: Manage Persistent Pain  Flowsheets (Taken 4/17/2024 1919)  Sleep/Rest Enhancement: relaxation techniques promoted  Bowel Elimination Promotion: adequate fluid intake promoted  Medication Review/Management: medications reviewed  Intervention: Optimize Psychosocial Wellbeing  Flowsheets (Taken 4/17/2024 1919)  Spiritual Activities Assistance: affirmation provided  Supportive Measures:   active listening utilized   goal-setting facilitated   self-care encouraged  Diversional Activities: television

## 2024-04-18 NOTE — PROGRESS NOTES
"[facility]  [facility] MEDICINE ~ PROGRESS NOTE    CHIEF COMPLAINT     Hospital follow up    HOSPITAL COURSE     Patient is 79 years old male with a past medical history of rheumatoid arthritis, osteoarthritis, osteopenia, hx of partial colon resection ("black spot on colon" - reportedly non cancerous), and newly diagnosed HTN who initially presented to [facility] s/p fall at home resulting in a right intertrochanteric femur fracture. Patient subsequently underwent IM nailing of right intertrochanteric femur fracture on 04/11/2024 with Dr. Tatum.  Patient was noted to have elevated blood pressure readings during admission therefore was initiated on lisinopril, currently receiving 40 mg daily. Patient was transferred to our swing unit for continued therapy.  On exam today, patient denies any complaints.  He does endorse an episode of choking while eating breakfast this morning but contributes this to the dry food.  Will obtain SLP eval prior to initiating diet.  At baseline, patient does use an assistive device to walk, mainly Rollator.  He also has a powered wheelchair, a walker, a ramp at home, and lives with his wife who does assist with ADLs as needed.     Today:  Patient was evaluated by SLP yesterday who recommends a MBS.  Patient is scheduled to complete it this morning and then advance diet as able.  His patient reports pain is not well-controlled.  We will increase methylprednisolone to 8 mg for 3 days and change oxycodone to Tylenol 3.    OBJECTIVE/PHYSICAL EXAM     VITAL SIGNS (MOST RECENT):  Temp: 97.4 °F (36.3 °C) (04/18/24 0700)  Pulse: 97 (04/18/24 0700)  Resp: 19 (04/18/24 0700)  BP: (!) 178/86 (04/18/24 0700)  SpO2: 97 % (04/18/24 0700) VITAL SIGNS (24 HOUR RANGE):  Temp:  [97.4 °F (36.3 °C)-98.4 °F (36.9 °C)] 97.4 °F (36.3 °C)  Pulse:  [79-97] 97  Resp:  [18-20] 19  SpO2:  [96 %-97 %] 97 %  BP: (130-178)/(64-86) 178/86     GENERAL: In no acute distress, afebrile  HEENT:  " Atraumatic, normocephalic, moist mucous membranes  CHEST: Clear to auscultation bilaterally  HEART: S1, S2, no appreciable murmur  ABDOMEN: Soft, nontender, BS +  MSK: Warm, no lower extremity edema, RA changes throughout   NEUROLOGIC: Alert and oriented x4, moving all extremities   INTEGUMENTARY: Right hip incision without erythema or drainage   PSYCHIATRY: Appropriate mood and affect     ASSESSMENT/PLAN     S/p fall at home resulting in a right intertrochanteric femur fracture  Underwent IM nailing of right intertrochanteric femur fracture on 04/11/2024 with Dr. Tatum  WBAT to RLE, full ROM   Lovenox for DVT prevention, and orthopedics recommends aspirin 81 mg b.i.d. upon discharge, but patient is allergic.  PT/OT  Follow-up with Dr. Tatum on 04/30/2024 at 0800  Pain control as needed    Dysphagia  Currently NPO  MBS scheduled for today      Post-op anemia   Repeat labs in AM     HTN   Reports newly diagnosed   Currently receiving Lisinopril 40mg QD     Rheumatoid arthritis, osteoarthritis  Continue home methylprednisone 4 mg daily - increasing to 8mg QD x3 days for better pain control   PPI during admission     DVT prophylaxis: Lovenox     Anticipated discharge and disposition: Continue PT/OT  __________________________________________________________________________    LABS/MICRO/MEDS/DIAGNOSTICS     LABS  Recent Labs     04/18/24  0420      K 3.8   CHLORIDE 103   CO2 28   BUN 14.6   CREATININE 0.74   GLUCOSE 94   CALCIUM 8.9     Recent Labs     04/18/24  0420   WBC 8.76   RBC 3.47*   HCT 32.0*   MCV 92.2        MICROBIOLOGY  Microbiology Results (last 7 days)       ** No results found for the last 168 hours. **             MEDICATIONS  Current Facility-Administered Medications   Medication Dose Route Frequency    calcium-vitamin D3  1 tablet Oral BID    enoxparin  40 mg Subcutaneous Daily    famotidine  20 mg Oral BID    lisinopriL  40 mg Oral Daily    methocarbamoL  500 mg Oral TID     "methylPREDNISolone  4 mg Oral Daily    methylPREDNISolone  4 mg Oral Daily    senna-docusate 8.6-50 mg  2 tablet Oral BID         INFUSIONS  Current Facility-Administered Medications   Medication Dose Route Frequency Last Rate Last Admin        DIAGNOSTIC TESTS  Fl Modified Barium Swallow Speech    (Results Pending)        No results found for: "EF"     NUTRITION STATUS  Patient meets ASPEN criteria for   malnutrition of   per RD assessment as evidenced by:                       A minimum of two characteristics is recommended for diagnosis of either severe or non-severe malnutrition.     Case related differential diagnoses have been reviewed; assessment and plan has been documented. I have personally reviewed the labs and test results that are currently available; I have reviewed the patients medication list. I have reviewed the consulting providers recommendations. I have reviewed or attempted to review medical records based upon their availability.  All of the patient's and/or family's questions have been addressed and answered to the best of my ability.  I will continue to monitor closely and make adjustments to medical management as needed.  This document was created using M*Modal Fluency Direct.  Transcription errors may have been made.  Please contact me if any questions may rise regarding documentation to clarify transcription.      JOEY Brooks   Internal Medicine  Department of Hospital Medicine [facility]    "

## 2024-04-18 NOTE — PT/OT/SLP PROGRESS
Physical Therapy Treatment Note           Name: Terence Tanner Jr.    : 1945 (79 y.o.)  MRN: [id number]           TREATMENT SUMMARY AND RECOMMENDATIONS:    PT Received On: 24  PT Start Time: 925     PT Stop Time:   PT Total Time (min): 45 min     Subjective Assessment:   No complaints  Lethargic   x Awake, alert, cooperative  Uncooperative   x Agitated x c/o pain    Appropriate x c/o fatigue    Confused  Treated at bedside     Emotionally labile x Treated in gym/dept.    Impulsive  Other:    Flat affect       Therapy Precautions:    Cognitive deficits  Spinal precautions    Collar - hard  Sternal precautions    Collar - soft   TLSO   x Fall risk  LSO    Hip precautions - posterior  Knee immobilizer    Hip precautions - anterior x WBAT    Impaired communication  Partial weightbearing    Oxygen  TTWB    PEG tube  NWB    Visual deficits x Other:currently NPO-awaiting ST assessment     Hearing deficits          Treatment Objectives:     Mobility Training:   Assist level Comments    Bed mobility MIN A Sit>Supine; assistance with R LE   Transfer CGA/MIN A Stand pivot bed<>Swallow study chair and swallow study chair<>WC without AD   Gait Unable    Sit to stand transitions CGA/SBA Multiple times throughout session    Sitting balance     Standing balance      Wheelchair mobility     Car transfer     Other:          Therapeutic Exercise:   Exercise Sets Reps Comments   STM to B quad and knee due to RA/Pain using biofreeze  5'    Seated B LE EX- AAROM R LE, AROM LLE  20 Hip flexion, knee ext, ankle PF/DF                   Additional Comments:  PT in more pain today, unable to ambulate and more agitated/restless . PT assisted ST getting pt on and off chair for MBS completion.     Assessment: Patient tolerated session Fair; impacted by pain. PT to progress as able. .    PT Plan: continue per POC  Revisions made to plan of care: No    GOALS:   Multidisciplinary Problems       Physical Therapy Goals           Problem: Physical Therapy    Goal Priority Disciplines Outcome Goal Variances Interventions   Physical Therapy Goal     PT, PT/OT Ongoing, Progressing     Description: Goals to be met by: Discharge      Patient will increase functional independence with mobility by performin. Sit to stand transfer with Modified Kay  2. Bed to chair transfer with Modified Kay using Rolling Walker  3. Gait  x at least 50 feet with Modified Kay and up to 175 feet with SBA using Rolling Walker.   4. Increased functional strength to 4/5 for R LE  5. Supine<>Sit with MOD I.                         Skilled PT Minutes Provided: 30   Communication with Treatment Team:     Equipment recommendations:       At end of treatment, patient remained:   Up in chair     Up in wheelchair in room   x In bed    With alarm activated   x Bed rails up   x Call bell in reach    x Family/friends present    Restraints secured properly    In bathroom with CNA/RN notified    Nurse aware    In gym with therapist/tech    Other:

## 2024-04-18 NOTE — PT/OT/SLP PROGRESS
Physical Therapy Treatment Note           Name: Terence Tanner Jr.    : 1945 (79 y.o.)  MRN: [id number]           TREATMENT SUMMARY AND RECOMMENDATIONS:    PT Received On: 24  PT Start Time: 1325     PT Stop Time: 1345  PT Total Time (min): 20 min     Subjective Assessment:   No complaints  Lethargic   x Awake, alert, cooperative  Uncooperative   x Agitated x c/o pain    Appropriate x c/o fatigue    Confused  Treated at bedside     Emotionally labile x Treated in gym/dept.    Impulsive  Other:    Flat affect       Therapy Precautions:    Cognitive deficits  Spinal precautions    Collar - hard  Sternal precautions    Collar - soft   TLSO   x Fall risk  LSO    Hip precautions - posterior  Knee immobilizer    Hip precautions - anterior x WBAT    Impaired communication  Partial weightbearing    Oxygen  TTWB    PEG tube  NWB    Visual deficits  Other:currently NPO-awaiting ST assessment     Hearing deficits          Treatment Objectives:     Mobility Training:   Assist level Comments    Bed mobility MIN A Sit>Supine; assistance with R LE   Transfer CGA/MIN A Stand pivot WC>Bed using RW   Gait CGA  X 35 feet using RW, following with WC   Sit to stand transitions CGA/SBA Multiple times throughout session    Sitting balance     Standing balance      Wheelchair mobility     Car transfer     Other:          Therapeutic Exercise:   Exercise Sets Reps Comments   Supine PROM to R LE  10 SLR, KTC to tolerance   Sciatic and Peroneal N glides in long sitting  10 ea    Supine quat sets and hooklying hip add/abd isometrics   10              Additional Comments:  PT in 11/10 pain. Pain is impacting tx participation. PT to progress as able.     Assessment: Patient tolerated session Fair; impacted by pain.     PT Plan: continue per POC  Revisions made to plan of care: No    GOALS:   Multidisciplinary Problems       Physical Therapy Goals          Problem: Physical Therapy    Goal Priority Disciplines Outcome Goal  Variances Interventions   Physical Therapy Goal     PT, PT/OT Ongoing, Progressing     Description: Goals to be met by: Discharge      Patient will increase functional independence with mobility by performin. Sit to stand transfer with Modified Lund  2. Bed to chair transfer with Modified Lund using Rolling Walker  3. Gait  x at least 50 feet with Modified Lund and up to 175 feet with SBA using Rolling Walker.   4. Increased functional strength to 4/5 for R LE  5. Supine<>Sit with MOD I.                         Skilled PT Minutes Provided: 15   Communication with Treatment Team:     Equipment recommendations:       At end of treatment, patient remained:   Up in chair     Up in wheelchair in room   x In bed   x With alarm activated   x Bed rails up   x Call bell in reach    x Family/friends present    Restraints secured properly    In bathroom with CNA/RN notified    Nurse aware    In gym with therapist/tech    Other:

## 2024-04-18 NOTE — PROGRESS NOTES
"Inpatient Nutrition Assessment    Admit Date: 4/17/2024   Total duration of encounter: 1 day   Patient Age: 79 y.o.    Nutrition Recommendation/Prescription     Continue easy to chew mildly thick liquid: soft and bite sized gravy on meats. 2.Add boost original 1 bottle BID provides 240 kcal and 10 gm of protein, per serving. 3. Monitor intake, wt, labs, medications    Communication of Recommendations: reviewed with provider and reviewed with patient    Nutrition Assessment     Malnutrition Assessment/Nutrition-Focused Physical Exam    Malnutrition Context: acute illness or injury (04/18/24 1455)  Malnutrition Level: moderate (04/18/24 1455)  Energy Intake (Malnutrition): less than 75% for greater than 7 days (04/18/24 1455)  Weight Loss (Malnutrition): other (see comments) (-5.97% in recent months. Reported 10# wt loss pt stated) (04/18/24 1455)  Subcutaneous Fat (Malnutrition): moderate depletion (04/18/24 1455)  Orbital Region (Subcutaneous Fat Loss): moderate depletion  Upper Arm Region (Subcutaneous Fat Loss): moderate depletion     Muscle Mass (Malnutrition): moderate depletion (04/18/24 1455)     Clavicle Bone Region (Muscle Loss): moderate depletion        Dorsal Hand (Muscle Loss): moderate depletion                    A minimum of two characteristics is recommended for diagnosis of either severe or non-severe malnutrition.    Chart Review    Reason Seen: continuous nutrition monitoring, length of stay, and follow-up    Malnutrition Screening Tool Results   Have you recently lost weight without trying?: Yes: 2-13 lbs  Have you been eating poorly because of a decreased appetite?: No   MST Score: 1   Diagnosis:  S/p fall at home resulting in a right intertrochanteric femur fracture   Post-op anemia   HTN  Dysphagia  Rheumatoid arthritis, osteoarthritis  Relevant Medical History: rheumatoid arthritis, osteoarthritis, osteopenia, hx of partial colon resection ("black spot on colon" - non cancerous     Scheduled " Medications:  calcium-vitamin D3, 1 tablet, BID  enoxparin, 40 mg, Daily  famotidine, 20 mg, BID  lisinopriL, 40 mg, Daily  methocarbamoL, 500 mg, TID  methylPREDNISolone, 4 mg, Daily  senna-docusate 8.6-50 mg, 2 tablet, BID    Continuous Infusions:   PRN Medications:   Current Facility-Administered Medications:     acetaminophen, 325 mg, Oral, Q4H PRN    acetaminophen, 650 mg, Oral, Q4H PRN    [START ON 4/19/2024] acetaminophen-codeine 300-30mg, 1 tablet, Oral, Q4H PRN    albuterol-ipratropium, 3 mL, Nebulization, Q6H PRN    aluminum-magnesium hydroxide-simethicone, 30 mL, Oral, QID PRN    barium sulfate, 140 mL, Oral, ONCE PRN    benzonatate, 100 mg, Oral, TID PRN    bisacodyL, 10 mg, Rectal, Daily PRN    calcium carbonate, 500 mg, Oral, TID PRN    ez paste cream ba sulfate, 10 g, Oral, ONCE PRN    guaiFENesin 100 mg/5 ml, 200 mg, Oral, Q4H PRN    guaiFENesin-codeine 100-10 mg/5 ml, 5 mL, Oral, Q4H PRN    hydrALAZINE, 10 mg, Intravenous, Q4H PRN    HYDROcodone-acetaminophen, 1 tablet, Oral, Q6H PRN    labetalol, 10 mg, Intravenous, QID PRN    melatonin, 9 mg, Oral, Nightly PRN    morphine, 2 mg, Intravenous, Q6H PRN    naloxone, 0.02 mg, Intravenous, PRN    ondansetron, 8 mg, Oral, Q8H PRN    ondansetron, 4 mg, Intravenous, Q8H PRN    polyethylene glycol, 17 g, Oral, TID PRN    simethicone, 1 tablet, Oral, QID PRN    sodium chloride 0.9%, 10 mL, Intravenous, PRN    Calorie Containing IV Medications: no significant kcals from medications at this time    Recent Labs   Lab 04/12/24  0616 04/14/24  0457 04/18/24  0420    139 140   K 3.6 3.6 3.8   CALCIUM 8.6* 9.0 8.9   CHLORIDE 103 101 103   CO2 27 30 28   BUN 15.7 17.7 14.6   CREATININE 0.82 0.83 0.74   EGFRNORACEVR >60 >60 >60   GLUCOSE 107 93 94   BILITOT 0.4 0.6  --    ALKPHOS 34* 32*  --    ALT 9 6  --    AST 17 18  --    ALBUMIN 2.7* 2.4*  --    WBC 13.34* 8.15 8.76   HGB 10.4* 9.5* 10.6*   HCT 31.4* 28.9* 32.0*     Nutrition Orders:  Diet Easy to Chew  "(IDDSI Level 7) Mildly Thick Liquids (IDDSI Level 2)      Appetite/Oral Intake: fair/50-75% of meals  Factors Affecting Nutritional Intake: chewing difficulty, decreased appetite, and difficulty/impaired swallowing  Social Needs Impacting Access to Food: none identified  Food/Faith/Cultural Preferences: none reported  Food Allergies: none reported  Last Bowel Movement: 24  Wound(s):      Comments  Per MD notes. Patient complained of difficulty swallowing upon admission, he was evaluated by speech therapy and needs to remain NPO until a barium swallow is performed.  Does affirm that he has been compensating for difficulty swallowing at home for many years and has self modified his diet to easy to chew foods.     24: SLP recommended easy to chew, soft and bite sized gravy on meats mildly thick liquids. Delivered lunch tray to patient. Family present during rounds. Pt and family reported decrease intake over week. Pt has good appetite at home with access to meals. Pt follow easy to chew diet. Discussed diet change with family and patient. Pt on thickened liquids. Provide education on how to thickened to mildly thick liquids consistency with family. Discussed food preferences. Pt also report weight loss of 10#. Pt drinks boost at home. Provide recs to MD for addition nutrition since, decrease intake x one week.      24: Pt c/o of difficulty swallowing. Pt NPO until barium swallow. Per MD pt follow easy to chew foods at home. Will follow up with SLP on diet modifications. Will complete NFPE on next follow up.     Anthropometrics    Height: 5' 9" (175.3 cm),    Last Weight: 66.2 kg (145 lb 15.1 oz) (24 1452), Weight Method: Bed Scale  BMI (Calculated): 21.5  BMI Classification: normal (BMI 18.5-24.9)        Ideal Body Weight (IBW), Male: 160 lb     % Ideal Body Weight, Male (lb): 91.22 %                 Usual Body Weight (UBW), k.4 kg  % Usual Body Weight: 94.23  % Weight Change From Usual " Weight: -5.97 %  Usual Weight Provided By: patient    Wt Readings from Last 5 Encounters:   04/18/24 66.2 kg (145 lb 15.1 oz)   04/12/24 70.3 kg (155 lb)     Weight Change(s) Since Admission:   Wt Readings from Last 1 Encounters:   04/18/24 1452 66.2 kg (145 lb 15.1 oz)   04/17/24 1541 66.2 kg (145 lb 15.1 oz)   04/17/24 1100 66.2 kg (145 lb 15.1 oz)   Admit Weight: 66.2 kg (145 lb 15.1 oz) (04/17/24 1100), Weight Method: Bed Scale    Estimated Needs    Weight Used For Calorie Calculations: 66.2 kg (145 lb 15.1 oz)  Energy Calorie Requirements (kcal): 1,784.09 kcal (Smyth-St Jeor x 1.3 stress factor/kg/CBW)  Energy Need Method: Smyth-St Jeor  Weight Used For Protein Calculations: 66.2 kg (145 lb 15.1 oz)  Protein Requirements: 79.61 gm (1.2 gm/kg/CBW)  Fluid Requirements (mL): 1,784.09 mL (1 mL/kcal)        Enteral Nutrition     Patient not receiving enteral nutrition at this time.    Parenteral Nutrition     Patient not receiving parenteral nutrition support at this time.    Evaluation of Received Nutrient Intake    Calories: meeting estimated needs  Protein: meeting estimated needs    Patient Education     Not applicable.    Nutrition Diagnosis     PES: Swallowing difficulty related to oropharyngeal dysphagia as evidence bydecreased mastication, a-p transfer, delayed swallow reflex, pharyngeal residue after the swallow. -valleculae, pyriform sinus, top of airway.  Laryngeal penetration with thin liquids   . (new)     PES: Moderate acute disease or injury related malnutrition related to acute illness as evidenced by less than 75% needs met for greater than 7 days, moderate fat depletion, and moderate muscle depletion. (new)    Nutrition Interventions     Intervention(s): general/healthful diet, modified composition of meals/snacks, commercial beverage, and collaboration with other providers    Goal: Consume % of meals/snacks by follow-up. (new)  Goal: Consume % of meals/snacks by follow-up.  (new)    Nutrition Goals & Monitoring     Dietitian will monitor: food and beverage intake, weight, weight change, electrolyte/renal panel, glucose/endocrine profile, and gastrointestinal profile  Discharge planning:  easy to chew soft bite sized meats with extra gravy mildly thick liquids with boost supplements diet with texture modifications per SLP  Nutrition Risk/Follow-Up: moderate (follow-up in 3-5 days)   Please consult if re-assessment needed sooner.

## 2024-04-18 NOTE — PT/OT/SLP PROGRESS
Occupational Therapy  Treatment    Name: Terence Tanner Jr.    : 1945 (79 y.o.)  MRN: [id number]           TREATMENT SUMMARY AND RECOMMENDATIONS:      OT Date of Treatment: 24  OT Start Time: 1300  OT Stop Time: 1326  OT Total Time (min): 26 min      Subjective Assessment:   No complaints  Lethargic   X Awake, alert, cooperative  Impulsive    Uncooperative   Flat affect    Agitated X c/o pain (11/10 RLE)    Appropriate  c/o fatigue    Confused X Treated at bedside     Emotionally labile X Treated in gym/dept.      Other:        Therapy Precautions:    Cognitive deficits  Spinal precautions    Collar - hard  Sternal precautions    Collar - soft   TLSO   X Fall risk  LSO    Hip precautions - posterior  Knee immobilizer    Hip precautions - anterior X WBAT RLE    Impaired communication  Partial weightbearing    Oxygen  TTWB    PEG tube  NWB    Visual deficits      Hearing deficits  X Other: ROMAT RLE       Treatment Objectives:     Mobility Training:    Mobility task Assist level Comments    Bed mobility SBA Semi supine>EOB   Transfer     Sit to stands transitions Min A From EOB c BUE support   Functional mobility CGA Pt ambulated ~30ft c RW.    Sitting balance     Standing balance      Other:        ADL Training:    ADL Assist level Comments    Feeding     Grooming/hygiene     Bathing     Upper body dressing     Lower body dressing     Toileting Mod A Pt demonstrated ability to manage clothing up/down. OT assisting c hygiene mgmt 2/2 pain and standing balance   Toilet transfer Min A Stand step t/f c RW EOB>toilet>standing   Adaptive equipment training     Other:           Therapeutic Exercise:   Exercise Sets Reps Comments   UB strengthening 2 10 With 2# dowel, pt perf bicep curls, chest press, and                          Additional Comments:  Pt's wife asking about using home OTC salonpas gel. OT informing NSG/PA of pt's wife request.   NSG informed of pt c/o pain.     Assessment: Patient tolerated session  fair. Pt continues to be limited by pain. Pt continues to be demonstrate deficits in fxnl endurance, fxnl ax tolerance, balance, safety awareness, pain, FM, and STS t/fs. Pt would continue to benefit from skilled OT services to improve pt's safety and independence with daily occupations, decrease caregiver burden, and reduce pt's risk of falls.     GOALS:   Multidisciplinary Problems       Occupational Therapy Goals          Problem: Occupational Therapy    Goal Priority Disciplines Outcome Interventions   Occupational Therapy Goal     OT, PT/OT Ongoing, Progressing    Description: Goals to be met by: 5/17/2024     Patient will increase functional independence with ADLs by performing:    LE Dressing with Stand-by Assistance.  Grooming while standing at sink with Stand-by Assistance.  Toileting from toilet with Stand-by Assistance for hygiene and clothing management.   Toilet transfer to toilet with Stand-by Assistance.                         Recommendations:     Discharge Equipment Recommendations:  none     Plan:     Patient to be seen  (5-6x/week (QD-BID)) to address the above listed problems via self-care/home management, neuromuscular re-education, therapeutic activities, therapeutic exercises  Plan of Care Expires: 05/17/24  Plan of Care Reviewed with: patient, spouse  Revisions made to plan of care: No      Skilled OT Minutes Provided: 26  Communication with Treatment Team:     Equipment recommendations:       At end of treatment, patient remained:   Up in chair     Up in wheelchair in room    In bed    With alarm activated    Bed rails up    Call bell in reach     Family/friends present    Restraints secured properly    In bathroom with CNA/RN notified   X In gym with PT/PTA/tech    Nurse aware    Other:

## 2024-04-18 NOTE — PT/OT/SLP PROGRESS
Occupational Therapy  Treatment    Name: Terence Tanner Jr.    : 1945 (79 y.o.)  MRN: [id number]           TREATMENT SUMMARY AND RECOMMENDATIONS:      OT Date of Treatment: 24  OT Start Time: 900  OT Stop Time: 923  OT Total Time (min): 23 min      Subjective Assessment:   No complaints  Lethargic   X Awake, alert, cooperative  Impulsive    Uncooperative   Flat affect    Agitated X c/o pain (12/10 RLE)    Appropriate  c/o fatigue    Confused X Treated at bedside     Emotionally labile X Treated in gym/dept.      Other:        Therapy Precautions:    Cognitive deficits  Spinal precautions    Collar - hard  Sternal precautions    Collar - soft   TLSO   X Fall risk  LSO    Hip precautions - posterior  Knee immobilizer    Hip precautions - anterior X WBAT RLE    Impaired communication  Partial weightbearing    Oxygen  TTWB    PEG tube  NWB    Visual deficits      Hearing deficits  X Other: ROMAT RLE       Treatment Objectives:     Mobility Training:    Mobility task Assist level Comments    Bed mobility Min A Semi supine>EOB   Transfer CGA Stand step t/f c RW EOB>w/c   Sit to stands transitions     Functional mobility     Sitting balance     Standing balance      Other:        ADL Training:    ADL Assist level Comments    Feeding     Grooming/hygiene     Bathing     Upper body dressing Setup Pt seated unsupported doffed hospital gown and donned pullover shirt   Lower body dressing Min A Pt seated unsupported donned pants. Assistance needed to pull pant legs up   Toileting     Toilet transfer     Adaptive equipment training     Other:           Therapeutic Exercise:   Exercise Sets Reps Comments   UBE 2 5' F/B Min-Mod resistance  No r/bs                         Additional Comments:  NSG informed of pt c/o 12/10 pain.     Assessment: Patient tolerated session fair. Pt limited this session 2/2 pain. Pt continues to demonstrate deficits in fxnl endurance, fxnl ax tolerance, balance, safety awareness, pain, and  overall strength impacting occupational performance. Pt would continue to benefit from skilled OT services to improve pt's safety and independence with daily occupations, decrease caregiver burden, and reduce pt's risk of falls.     GOALS:   Multidisciplinary Problems       Occupational Therapy Goals          Problem: Occupational Therapy    Goal Priority Disciplines Outcome Interventions   Occupational Therapy Goal     OT, PT/OT Ongoing, Progressing    Description: Goals to be met by: 5/17/2024     Patient will increase functional independence with ADLs by performing:    LE Dressing with Stand-by Assistance.  Grooming while standing at sink with Stand-by Assistance.  Toileting from toilet with Stand-by Assistance for hygiene and clothing management.   Toilet transfer to toilet with Stand-by Assistance.                         Recommendations:     Discharge Equipment Recommendations:  none     Plan:     Patient to be seen  (5-6x/week (QD-BID)) to address the above listed problems via self-care/home management, neuromuscular re-education, therapeutic activities, therapeutic exercises  Plan of Care Expires: 05/17/24  Plan of Care Reviewed with: patient, spouse  Revisions made to plan of care: No      Skilled OT Minutes Provided: 23  Communication with Treatment Team:     Equipment recommendations:       At end of treatment, patient remained:   Up in chair     Up in wheelchair in room    In bed    With alarm activated    Bed rails up    Call bell in reach     Family/friends present    Restraints secured properly    In bathroom with CNA/RN notified   X In gym with PT/PTA/tech    Nurse aware    Other:

## 2024-04-18 NOTE — PLAN OF CARE
[facility] - Medical Surgical Unit  Initial Discharge Assessment       Primary Care Provider: Whitney, Primary Doctor    Admission Diagnosis: Closed fracture of right hip [S72.001A]    Admission Date: 4/17/2024  Expected Discharge Date: 4/30/2024    Transition of Care Barriers: None    Payor: [facility] MCARE / Plan: [facility] MEDICARE COMPLETE / Product Type: Medicare Advantage /     Extended Emergency Contact Information  Primary Emergency Contact: Amelia Tanner  Home Phone: [phone]  Mobile Phone: [phone]  Relation: Spouse  Preferred language: English   needed? No    Discharge Plan A: Home Health, Home with family       No Pharmacies Listed    Initial Assessment (most recent)       Adult Discharge Assessment - 04/18/24 0825          Discharge Assessment    Assessment Type Discharge Planning Assessment     Confirmed/corrected address, phone number and insurance Yes     Confirmed Demographics Correct on Facesheet     Source of Information patient     If unable to respond/provide information was family/caregiver contacted? Yes     Contact Name/Number Amelia spouse [phone]     Communicated LIZABETH with patient/caregiver Date not available/Unable to determine     Reason For Admission S/p Fall FX Femur     People in Home spouse     Facility Arrived [place]: [facility] [place]     Do you expect to return to your current living situation? Yes     Do you have help at home or someone to help you manage your care at home? Yes     Who are your caregiver(s) and their phone number(s)? Amelia spouse [phone]     Prior to hospitilization cognitive status: Alert/Oriented     Current cognitive status: Alert/Oriented     Walking or Climbing Stairs Difficulty yes     Walking or Climbing Stairs ambulation difficulty, assistance 1 person     Dressing/Bathing Difficulty no     Home Accessibility wheelchair accessible     Home Layout Able to live on 1st floor     Equipment Currently Used at Home bedside commode;walker,  rolling     Readmission within 30 days? No     Patient currently being followed by outpatient case management? No     Do you currently have service(s) that help you manage your care at home? No     Do you take prescription medications? Yes     Do you have prescription coverage? Yes     Coverage [facility]     Do you have any problems affording any of your prescribed medications? TBD     Is the patient taking medications as prescribed? yes     Who is going to help you get home at discharge? Amelia spouse [phone]     How do you get to doctors appointments? family or friend will provide     Are you on dialysis? No     Do you take coumadin? No     Discharge Plan A Home Health;Home with family     DME Needed Upon Discharge  none     Discharge Plan discussed with: Spouse/sig other     Name(s) and Number(s) Amelia spouse [phone]     Transition of Care Barriers None        Physical Activity    On average, how many days per week do you engage in moderate to strenuous exercise (like a brisk walk)? 0 days     On average, how many minutes do you engage in exercise at this level? 0 min        Financial Resource Strain    How hard is it for you to pay for the very basics like food, housing, medical care, and heating? Not very hard        Housing Stability    In the last 12 months, was there a time when you were not able to pay the mortgage or rent on time? No     In the past 12 months, how many times have you moved where you were living? 1     At any time in the past 12 months, were you homeless or living in a shelter (including now)? No        Transportation Needs    In the past 12 months, has lack of transportation kept you from medical appointments or from getting medications? No     In the past 12 months, has lack of transportation kept you from meetings, work, or from getting things needed for daily living? No        Food Insecurity    Within the past 12 months, you worried that your food would run out before you got the  money to buy more. Never true     Within the past 12 months, the food you bought just didn't last and you didn't have money to get more. Never true        Stress    Do you feel stress - tense, restless, nervous, or anxious, or unable to sleep at night because your mind is troubled all the time - these days? To some extent        Social Connections    In a typical week, how many times do you talk on the phone with family, friends, or neighbors? More than three times a week     How often do you get together with friends or relatives? More than three times a week     How often do you attend Anglican or Scientologist services? 1 to 4 times per year     Do you belong to any clubs or organizations such as Anglican groups, unions, fraternal or athletic groups, or school groups? Yes     How often do you attend meetings of the clubs or organizations you belong to? 1 to 4 times per year     Are you , , , , never , or living with a partner?         Alcohol Use    Q1: How often do you have a drink containing alcohol? Never     Q2: How many drinks containing alcohol do you have on a typical day when you are drinking? Patient does not drink     Q3: How often do you have six or more drinks on one occasion? Never        OTHER    Name(s) of People in Home Amelia spouse [phone]

## 2024-04-18 NOTE — PT/OT/SLP EVAL
Modified Barium Swallow    Patient Name:  Terence Tanner Jr.   MRN:  [id number]      Recommendations:     Recommendations:                General Recommendations:  Dysphagia therapy  Diet recommendations:   ,     Aspiration Precautions: 1 bite/sip at a time, Assistance with meals and Assistance with thickening liquids, Chin tuck, HOB to 90 degrees, Meds crushed in puree, and Monitor for s/s of aspiration   General Precautions: Standard,    Communication strategies:  go to room if call light pushed    Referral     Reason for Referral  Patient was referred for a Modified Barium Swallow Study to assess the efficiency of his/her swallow function, rule out aspiration and make recommendations regarding safe dietary consistencies, effective compensatory strategies, and safe eating environment.     Diagnosis: Closed fracture of right hip       History:     No past medical history on file.    Objective:     Current Respiratory Status:       Alert: yes    Cooperative: yes    Follows Directions: yes    Prior Diet: Soft with gravy as per patient and wife.  Nothing dry.    Chest x-ray: Please see medical records    Patient goals: Get stronger and go home    Pain/Comfort:          Visualization  Patient was seen in the lateral view    Oral Peripheral Examination       Consistencies Assessed  Thin liquids    Nectar thick liquids    Puree    Solids      Oral Preparation/Oral Phase  Thin liquids Decreased base of tongue mobility, Nectar thick liquids Decreased base of tongue mobility, Puree Decreased base of tongue mobility, and Solids prolonged A-P transfer, prolonged mastication, and Decreased base of tongue mobility     Pharyngeal Phase   Thin liquids delayed swallow initation and multiple spontaneous swallows  Nectar thick liquids delayed swallow initation and multiple spontaneous swallows  Puree delayed swallow initation and throat clearing  Solids delayed swallow initation, multiple spontaneous swallows, and throat  clearing    Cervical Esophageal Phase  Decreased UES opening  Retrograde flow      Assessment:     Impressions   Patient demonstrates   Oropharyngeal  dysphagia characterized by decreased mastication, a-p transfer, delayed swallow reflex, pharyngeal residue after the swallow. -valleculae, pyriform sinus, top of airway.  Laryngeal penetration with thin liquids.    Activity Capabilities:  alertness, comprehension, expression, cognition, participation, and cooperation    Activity Limitations: pharyngeal residue, laryngeal penetration, and wears dentures    Prognosis: Good    Plan  Dysphagia exercises to increase tongue base and laryngeal strength     Education  Results were discussed with patient. Results were discussed with Medical Team who was in agreement with plan.  Role of Speech-Language Pathologist Goals of therapy   Also, discussed with patient's wife.  Wife needed results explained further and repeated.  Will follow up difficult to obtain history of patient's swallowing issues.    Goals:   Long Term Goal:   Tolerate least restrictive diet and liquids without s/s of aspiration  Short Term Goals:    Tolerate soft and bite sized diet with gravy (IDDSI 6) with mildly thick liquids  (IDDSI 2)  Complete tongue base and pharyngeal exercises 10x/5 sets  Verbalize and demonstrate swallowing strategies of chin tuck and alternating solids/liquids  Recommendation of possible esophagram-GI consult.    Plan:   Patient to be seen:  3-5 x/week    Plan of Care expires:  5/18/24   Plan of Care reviewed with:   Patient, wife, treatment team        Discharge recommendations:      Barriers to Discharge:  Level of Skilled Assistance Needed   and Safety Awareness      Time Tracking:        Billable Minutes:  Motion Fluoro Swallow, Cine/Vid 30    04/18/2024

## 2024-04-19 PROCEDURE — 99900035 HC TECH TIME PER 15 MIN (STAT)

## 2024-04-19 PROCEDURE — 97535 SELF CARE MNGMENT TRAINING: CPT

## 2024-04-19 PROCEDURE — 97110 THERAPEUTIC EXERCISES: CPT

## 2024-04-19 PROCEDURE — 92526 ORAL FUNCTION THERAPY: CPT

## 2024-04-19 PROCEDURE — 94760 N-INVAS EAR/PLS OXIMETRY 1: CPT

## 2024-04-19 PROCEDURE — 97530 THERAPEUTIC ACTIVITIES: CPT

## 2024-04-19 PROCEDURE — 25000003 PHARM REV CODE 250: Performed by: PHYSICIAN ASSISTANT

## 2024-04-19 PROCEDURE — 97116 GAIT TRAINING THERAPY: CPT

## 2024-04-19 PROCEDURE — 11000004 HC SNF PRIVATE

## 2024-04-19 PROCEDURE — 25000003 PHARM REV CODE 250: Performed by: STUDENT IN AN ORGANIZED HEALTH CARE EDUCATION/TRAINING PROGRAM

## 2024-04-19 PROCEDURE — 63600175 PHARM REV CODE 636 W HCPCS: Performed by: PHYSICIAN ASSISTANT

## 2024-04-19 RX ORDER — METHOCARBAMOL 750 MG/1
750 TABLET, FILM COATED ORAL 3 TIMES DAILY
Status: DISCONTINUED | OUTPATIENT
Start: 2024-04-19 | End: 2024-05-01 | Stop reason: HOSPADM

## 2024-04-19 RX ORDER — LIDOCAINE 50 MG/G
1 PATCH TOPICAL
Status: DISCONTINUED | OUTPATIENT
Start: 2024-04-19 | End: 2024-05-01 | Stop reason: HOSPADM

## 2024-04-19 RX ADMIN — Medication 1 TABLET: at 08:04

## 2024-04-19 RX ADMIN — SENNOSIDES AND DOCUSATE SODIUM 2 TABLET: 8.6; 5 TABLET ORAL at 09:04

## 2024-04-19 RX ADMIN — Medication 1 TABLET: at 09:04

## 2024-04-19 RX ADMIN — FAMOTIDINE 20 MG: 20 TABLET, FILM COATED ORAL at 08:04

## 2024-04-19 RX ADMIN — METHOCARBAMOL 750 MG: 750 TABLET ORAL at 02:04

## 2024-04-19 RX ADMIN — ENOXAPARIN SODIUM 40 MG: 40 INJECTION SUBCUTANEOUS at 05:04

## 2024-04-19 RX ADMIN — METHOCARBAMOL 500 MG: 500 TABLET ORAL at 08:04

## 2024-04-19 RX ADMIN — METHOCARBAMOL 750 MG: 750 TABLET ORAL at 09:04

## 2024-04-19 RX ADMIN — LISINOPRIL 40 MG: 20 TABLET ORAL at 08:04

## 2024-04-19 RX ADMIN — ACETAMINOPHEN AND CODEINE PHOSPHATE 1 TABLET: 300; 30 TABLET ORAL at 02:04

## 2024-04-19 RX ADMIN — LIDOCAINE 5% 1 PATCH: 700 PATCH TOPICAL at 09:04

## 2024-04-19 RX ADMIN — SENNOSIDES AND DOCUSATE SODIUM 2 TABLET: 8.6; 5 TABLET ORAL at 08:04

## 2024-04-19 RX ADMIN — METHYLPREDNISOLONE 4 MG: 4 TABLET ORAL at 08:04

## 2024-04-19 RX ADMIN — GUAIFENESIN AND CODEINE PHOSPHATE 5 ML: 100; 10 SOLUTION ORAL at 08:04

## 2024-04-19 RX ADMIN — FAMOTIDINE 20 MG: 20 TABLET, FILM COATED ORAL at 09:04

## 2024-04-19 RX ADMIN — ACETAMINOPHEN 325 MG: 325 TABLET ORAL at 08:04

## 2024-04-19 NOTE — PT/OT/SLP PROGRESS
Occupational Therapy  Treatment    Name: Terence Tanner Jr.    : 1945 (79 y.o.)  MRN: [id number]           TREATMENT SUMMARY AND RECOMMENDATIONS:      OT Date of Treatment: 24  OT Start Time: 1310  OT Stop Time: 1333  OT Total Time (min): 23 min      Subjective Assessment:   No complaints  Lethargic   X Awake, alert, cooperative  Impulsive    Uncooperative   Flat affect    Agitated X c/o pain    Appropriate  c/o fatigue    Confused X Treated at bedside     Emotionally labile X Treated in gym/dept.      Other:        Therapy Precautions:    Cognitive deficits  Spinal precautions    Collar - hard  Sternal precautions    Collar - soft   TLSO   X Fall risk  LSO    Hip precautions - posterior  Knee immobilizer    Hip precautions - anterior X WBAT    Impaired communication  Partial weightbearing    Oxygen  TTWB    PEG tube  NWB    Visual deficits      Hearing deficits  X Other: ROMAT RLE       Treatment Objectives:     Mobility Training:    Mobility task Assist level Comments    Bed mobility     Transfer     Sit to stands transitions CGA From chair level c BUE support   Functional mobility CGA Pt ambulated ~30ft c RW.    Sitting balance     Standing balance      Other:        ADL Training:    ADL Assist level Comments    Feeding     Grooming/hygiene     Bathing     Upper body dressing     Lower body dressing     Toileting CGA Pt demonstrated ability to manage clothing up/down   Toilet transfer CGA Stand step t/f c RW recliner>toilet>standing   Adaptive equipment training     Other:           Therapeutic Exercise:   Exercise Sets Reps Comments   UB strengthening 2 10 With 3# dowel, pt perf chest press, straight arm raises, bicep curls, forward rows, backwards rows. Min v/cs for proper technique and positioning. Min length r/bs PRN.                          Assessment: Patient tolerated session well. Pt continues to be limited by pain; however demonstrated good participation this session as evidenced by ability to  ambulate ~30ft this PM c RW CGA. Pt would continue to benefit from skilled OT services to improve pt's safety and independence with daily occupations, decrease caregiver burden, and reduce pt's risk of falls.    GOALS:   Multidisciplinary Problems       Occupational Therapy Goals          Problem: Occupational Therapy    Goal Priority Disciplines Outcome Interventions   Occupational Therapy Goal     OT, PT/OT Ongoing, Progressing    Description: Goals to be met by: 5/17/2024     Patient will increase functional independence with ADLs by performing:    LE Dressing with Stand-by Assistance.  Grooming while standing at sink with Stand-by Assistance.  Toileting from toilet with Stand-by Assistance for hygiene and clothing management.   Toilet transfer to toilet with Stand-by Assistance.                         Recommendations:     Discharge Equipment Recommendations:  none     Plan:     Patient to be seen  (5-6x/week (QD-BID)) to address the above listed problems via self-care/home management, neuromuscular re-education, therapeutic activities, therapeutic exercises  Plan of Care Expires: 05/17/24  Plan of Care Reviewed with: patient, spouse  Revisions made to plan of care: No      Skilled OT Minutes Provided: 23  Communication with Treatment Team:     Equipment recommendations:       At end of treatment, patient remained:   Up in chair     Up in wheelchair in room    In bed    With alarm activated    Bed rails up    Call bell in reach     Family/friends present    Restraints secured properly    In bathroom with CNA/RN notified   X In gym with PT/PTA/tech    Nurse aware    Other:

## 2024-04-19 NOTE — PT/OT/SLP PROGRESS
Speech Language Pathology Treatment    Patient Name:  Terence Tanner Jr.   MRN:  [id number]  Admitting Diagnosis: Closed fracture of right hip    Recommendations:                 General Recommendations:  Dysphagia therapy  Diet recommendations:   ,     Aspiration Precautions: 1 bite/sip at a time, Alternating bites/sips, Chin tuck, HOB to 90 degrees, and Meds crushed in puree   General Precautions: Standard,    Communication strategies:  go to room if call light pushed    Assessment:     Terence Tanner Jr. is a 79 y.o. male with an SLP diagnosis of Dysphagia.  He presents with moderate oropharyngeal dysphagia.    Subjective     Pt upright in chair upon SLP arrival.  Pt pleasant and cooperative.    Patient goals: Get leg better, stronger,  and return home     Pain/Comfort:       Respiratory Status: Room air    Objective:     Has the patient been evaluated by SLP for swallowing?   Yes   Keep patient NPO?     Current Respiratory Status:         Reviewed the recommended diet modification and the reasons, reviewed and demonstrated swallow strategies.  Tolerated soft and bite sized diet with gravy pt did have throat clearing x 3 while eating to clear materials, reviewed importance of sitting upright at 90 degrees while eating/drinking and 30 mins post. Progressing towards short term goals.          Goals:   Multidisciplinary Problems       SLP Goals          Problem: SLP    Goal Priority Disciplines Outcome   SLP Goal     SLP    Description: Goals:   Long Term Goal:  1.  Tolerate least restrictive diet and liquids without s/s of aspiration  Short Term Goals:    1. Tolerate soft and bite sized diet with gravy (IDDSI 6) with mildly thick liquids  (IDDSI 2)  2. Complete tongue base and pharyngeal exercises 10x/5 sets  3. Verbalize and demonstrate swallowing strategies of chin tuck and alternating solids/liquids  4. Recommendation of possible esophagram-GI consult.                         Plan:     Patient to be seen:  3-5x/week     Plan of Care expires:  5/18/24   Plan of Care reviewed with:   Patient and treatment team   SLP Follow-Up:  yes        Discharge recommendations:      Barriers to Discharge:  Level of Skilled Assistance Needed   and Safety Awareness      Time Tracking:     SLP Treatment Date: 4/19/24      Speech Start Time:   11:45  Speech Stop Time:    12:10    Speech Total Time (min):  25     Billable Minutes: Treatment Swallowing Dysfunction 25 04/19/2024

## 2024-04-19 NOTE — PT/OT/SLP PROGRESS
Physical Therapy Treatment Note           Name: Terence Tanner Jr.    : 1945 (79 y.o.)  MRN: [id number]           TREATMENT SUMMARY AND RECOMMENDATIONS:    PT Received On: 24  PT Start Time: 1335     PT Stop Time: 1400  PT Total Time (min): 25 min     Subjective Assessment:   No complaints  Lethargic   x Awake, alert, cooperative  Uncooperative   x Agitated x c/o pain    Appropriate x c/o fatigue    Confused  Treated at bedside     Emotionally labile x Treated in gym/dept.    Impulsive  Other:    Flat affect       Therapy Precautions:    Cognitive deficits  Spinal precautions    Collar - hard  Sternal precautions    Collar - soft   TLSO   x Fall risk  LSO    Hip precautions - posterior  Knee immobilizer    Hip precautions - anterior x WBAT    Impaired communication  Partial weightbearing    Oxygen  TTWB    PEG tube  NWB    Visual deficits  Other:currently NPO-awaiting ST assessment     Hearing deficits          Treatment Objectives:     Mobility Training:   Assist level Comments    Bed mobility MIN A Sit>Supine; assistance with R LE   Transfer CGA Stand pivot WC>Bed using HOB bed rail   Gait CGA  X 40 feet using RW, following with WC   Sit to stand transitions CGA/SBA Multiple times throughout session    Sitting balance     Standing balance      Wheelchair mobility     Car transfer     Other:          Therapeutic Exercise:   Exercise Sets Reps Comments   Supine PROM to R LE  10 SLR         Seated B LE EX- AAROM R LE, AROM LLE  20 Hip flexion, knee ext, ankle PF/DF, hip add/abd isometrics              Additional Comments:  Pt still very  impacted by pain, PT progressing as able.     Assessment: Patient tolerated session Fair; impacted by pain.     PT Plan: continue per POC  Revisions made to plan of care: No    GOALS:   Multidisciplinary Problems       Physical Therapy Goals          Problem: Physical Therapy    Goal Priority Disciplines Outcome Goal Variances Interventions   Physical Therapy Goal      PT, PT/OT Ongoing, Progressing     Description: Goals to be met by: Discharge      Patient will increase functional independence with mobility by performin. Sit to stand transfer with Modified Apex  2. Bed to chair transfer with Modified Apex using Rolling Walker  3. Gait  x at least 50 feet with Modified Apex and up to 175 feet with SBA using Rolling Walker.   4. Increased functional strength to 4/5 for R LE  5. Supine<>Sit with MOD I.                         Skilled PT Minutes Provided: 25   Communication with Treatment Team:     Equipment recommendations:       At end of treatment, patient remained:   Up in chair     Up in wheelchair in room   x In bed   x With alarm activated   x Bed rails up   x Call bell in reach    x Family/friends present    Restraints secured properly    In bathroom with CNA/RN notified    Nurse aware    In gym with therapist/tech    Other:

## 2024-04-19 NOTE — PLAN OF CARE
Problem: Adult Inpatient Plan of Care  Goal: Plan of Care Review  Outcome: Ongoing, Progressing  Flowsheets (Taken 4/18/2024 1941)  Plan of Care Reviewed With:   patient   spouse  Goal: Patient-Specific Goal (Individualized)  Outcome: Ongoing, Progressing  Flowsheets (Taken 4/18/2024 1941)  Anxieties, Fears or Concerns: Pt currently denies pain  Individualized Care Needs: Pain management, Supervision with PO intake, Safety with mobility, Meds crushed in pudding  Goal: Absence of Hospital-Acquired Illness or Injury  Outcome: Ongoing, Progressing  Intervention: Identify and Manage Fall Risk  Flowsheets (Taken 4/18/2024 1941)  Safety Promotion/Fall Prevention:   assistive device/personal item within reach   bed alarm set   muscle strengthening facilitated   nonskid shoes/socks when out of bed   lighting adjusted   medications reviewed   room near unit station  Intervention: Prevent Skin Injury  Flowsheets (Taken 4/18/2024 1941)  Body Position: sitting up in bed  Skin Protection:   adhesive use limited   incontinence pads utilized   skin-to-device areas padded   skin-to-skin areas padded   transparent dressing maintained   tubing/devices free from skin contact  Intervention: Prevent and Manage VTE (Venous Thromboembolism) Risk  Flowsheets (Taken 4/18/2024 1941)  Activity Management: Walk with assistive devise and /or staff member - L3  VTE Prevention/Management:   bleeding risk assessed   bleeding precations maintained   fluids promoted   ROM (active) performed  Range of Motion: active ROM (range of motion) encouraged  Intervention: Prevent Infection  Flowsheets (Taken 4/18/2024 1941)  Infection Prevention:   cohorting utilized   hand hygiene promoted   single patient room provided  Goal: Optimal Comfort and Wellbeing  Outcome: Ongoing, Progressing  Intervention: Monitor Pain and Promote Comfort  Flowsheets (Taken 4/18/2024 1941)  Pain Management Interventions:   diversional activity provided   pillow support  provided   position adjusted   quiet environment facilitated  Intervention: Provide Person-Centered Care  Flowsheets (Taken 4/18/2024 1941)  Trust Relationship/Rapport:   care explained   choices provided   questions answered   thoughts/feelings acknowledged     Problem: Infection  Goal: Absence of Infection Signs and Symptoms  Outcome: Ongoing, Progressing  Intervention: Prevent or Manage Infection  Flowsheets (Taken 4/18/2024 1941)  Fever Reduction/Comfort Measures:   lightweight bedding   lightweight clothing  Infection Management: aseptic technique maintained  Isolation Precautions:   protective   precautions maintained     Problem: Fall Injury Risk  Goal: Absence of Fall and Fall-Related Injury  Outcome: Ongoing, Progressing  Intervention: Identify and Manage Contributors  Flowsheets (Taken 4/18/2024 1941)  Self-Care Promotion:   BADL personal objects within reach   BADL personal routines maintained   safe use of adaptive equipment encouraged  Medication Review/Management: medications reviewed  Intervention: Promote Injury-Free Environment  Flowsheets (Taken 4/18/2024 1941)  Safety Promotion/Fall Prevention:   assistive device/personal item within reach   bed alarm set   muscle strengthening facilitated   nonskid shoes/socks when out of bed   lighting adjusted   medications reviewed   room near unit station     Problem: Swallowing Impairment  Goal: Optimal Eating and Swallowing Without Aspiration  Outcome: Ongoing, Progressing  Intervention: Optimize Eating and Swallowing  Flowsheets (Taken 4/18/2024 1941)  Aspiration Precautions: awake/alert before oral intake  Swallowing Interventions: Dysphagia: food and liquid intake alternated     Problem: Hypertension Comorbidity  Goal: Blood Pressure in Desired Range  Outcome: Ongoing, Progressing  Intervention: Maintain Blood Pressure Management  Flowsheets (Taken 4/18/2024 1941)  Medication Review/Management: medications reviewed     Problem: Osteoarthritis  Comorbidity  Goal: Maintenance of Osteoarthritis Symptom Control  Outcome: Ongoing, Progressing  Intervention: Maintain Osteoarthritis Symptom Control  Flowsheets (Taken 4/18/2024 1941)  Assistive Device Utilized:   gait belt   walker  Activity Management: Walk with assistive devise and /or staff member - L3  Medication Review/Management: medications reviewed     Problem: Pain Chronic (Persistent) (Comorbidity Management)  Goal: Acceptable Pain Control and Functional Ability  Outcome: Ongoing, Progressing  Intervention: Develop Pain Management Plan  Flowsheets (Taken 4/18/2024 1941)  Pain Management Interventions:   diversional activity provided   pillow support provided   position adjusted   quiet environment facilitated  Intervention: Manage Persistent Pain  Flowsheets (Taken 4/18/2024 1941)  Sleep/Rest Enhancement: relaxation techniques promoted  Bowel Elimination Promotion: adequate fluid intake promoted  Medication Review/Management: medications reviewed  Intervention: Optimize Psychosocial Wellbeing  Flowsheets (Taken 4/18/2024 1941)  Spiritual Activities Assistance: affirmation provided  Supportive Measures:   active listening utilized   goal-setting facilitated   self-care encouraged  Diversional Activities:   television   smartphone

## 2024-04-19 NOTE — PLAN OF CARE
Problem: Adult Inpatient Plan of Care  Goal: Plan of Care Review  Outcome: Ongoing, Progressing  Flowsheets (Taken 4/19/2024 0844)  Plan of Care Reviewed With:   patient   spouse  Goal: Patient-Specific Goal (Individualized)  Outcome: Ongoing, Progressing  Flowsheets (Taken 4/19/2024 0844)  Anxieties, Fears or Concerns: Right leg pain  Individualized Care Needs: pain management, NPO pending MBS results, safety with [place] to [facility] [place], meds crushed in applesauce  Goal: Absence of Hospital-Acquired Illness or Injury  Outcome: Ongoing, Progressing  Intervention: Identify and Manage Fall Risk  Flowsheets (Taken 4/19/2024 0844)  Safety Promotion/Fall Prevention:   assistive device/personal item within reach   bed alarm set   medications reviewed   nonskid shoes/socks when out of bed   side rails raised x 3   instructed to call staff for mobility  Intervention: Prevent Skin Injury  Flowsheets (Taken 4/19/2024 0844)  Body Position:   position changed independently   log-rolled   weight shifting   sitting up in bed   supine  Skin Protection:   adhesive use limited   incontinence pads utilized   transparent dressing maintained   tubing/devices free from skin contact  Intervention: Prevent and Manage VTE (Venous Thromboembolism) Risk  Flowsheets (Taken 4/19/2024 0844)  Activity Management: Rolling - L1  VTE Prevention/Management:   bleeding precations maintained   bleeding risk assessed  Intervention: Prevent Infection  Flowsheets (Taken 4/19/2024 0844)  Infection Prevention:   cohorting utilized   environmental surveillance performed   equipment surfaces disinfected   hand hygiene promoted   single patient room provided   rest/sleep promoted  Goal: Optimal Comfort and Wellbeing  Outcome: Ongoing, Progressing  Intervention: Monitor Pain and Promote Comfort  Flowsheets (Taken 4/19/2024 0844)  Pain Management Interventions:   care clustered   pillow support provided   position adjusted   medication offered  Intervention:  Provide Person-Centered Care  Flowsheets (Taken 4/19/2024 0844)  Trust Relationship/Rapport:   care explained   questions answered   questions encouraged  Goal: Readiness for Transition of Care  Outcome: Ongoing, Progressing     Problem: Infection  Goal: Absence of Infection Signs and Symptoms  Outcome: Ongoing, Progressing  Intervention: Prevent or Manage Infection  Flowsheets (Taken 4/19/2024 0844)  Infection Management: aseptic technique maintained     Problem: Fall Injury Risk  Goal: Absence of Fall and Fall-Related Injury  Outcome: Ongoing, Progressing  Intervention: Identify and Manage Contributors  Flowsheets (Taken 4/19/2024 0844)  Self-Care Promotion:   independence encouraged   BADL personal objects within reach   meal set-up provided   safe use of adaptive equipment encouraged  Medication Review/Management: medications reviewed  Intervention: Promote Injury-Free Environment  Flowsheets (Taken 4/19/2024 0844)  Safety Promotion/Fall Prevention:   assistive device/personal item within reach   bed alarm set   medications reviewed   nonskid shoes/socks when out of bed   side rails raised x 3   instructed to call staff for mobility     Problem: Swallowing Impairment  Goal: Optimal Eating and Swallowing Without Aspiration  Outcome: Ongoing, Progressing  Intervention: Optimize Eating and Swallowing  Flowsheets (Taken 4/19/2024 0844)  Aspiration Precautions: awake/alert before oral intake     Problem: Hypertension Comorbidity  Goal: Blood Pressure in Desired Range  Outcome: Ongoing, Progressing  Intervention: Maintain Blood Pressure Management  Flowsheets (Taken 4/19/2024 0844)  Medication Review/Management: medications reviewed     Problem: Osteoarthritis Comorbidity  Goal: Maintenance of Osteoarthritis Symptom Control  Outcome: Ongoing, Progressing  Intervention: Maintain Osteoarthritis Symptom Control  Flowsheets (Taken 4/19/2024 0844)  Activity Management: Rolling - L1  Medication Review/Management:  medications reviewed     Problem: Pain Chronic (Persistent) (Comorbidity Management)  Goal: Acceptable Pain Control and Functional Ability  Outcome: Ongoing, Progressing  Intervention: Develop Pain Management Plan  Flowsheets (Taken 4/19/2024 0844)  Pain Management Interventions:   care clustered   pillow support provided   position adjusted   medication offered  Intervention: Manage Persistent Pain  Flowsheets (Taken 4/19/2024 0844)  Bowel Elimination Promotion:   adequate fluid intake promoted   ambulation promoted  Medication Review/Management: medications reviewed

## 2024-04-19 NOTE — PROGRESS NOTES
"[facility]  [facility] MEDICINE ~ PROGRESS NOTE    CHIEF COMPLAINT     Hospital follow up    HOSPITAL COURSE     Patient is 79 years old male with a past medical history of rheumatoid arthritis, osteoarthritis, osteopenia, hx of partial colon resection ("black spot on colon" - reportedly non cancerous), and newly diagnosed HTN who initially presented to [facility] s/p fall at home resulting in a right intertrochanteric femur fracture. Patient subsequently underwent IM nailing of right intertrochanteric femur fracture on 04/11/2024 with Dr. Tatum.  Patient was noted to have elevated blood pressure readings during admission therefore was initiated on lisinopril, currently receiving 40 mg daily. Patient was transferred to our swing unit for continued therapy.  On exam today, patient denies any complaints.  He does endorse an episode of choking while eating breakfast this morning but contributes this to the dry food.  Will obtain SLP eval prior to initiating diet.  At baseline, patient does use an assistive device to walk, mainly Rollator.  He also has a powered wheelchair, a walker, a ramp at home, and lives with his wife who does assist with ADLs as needed.     Today:  MBS completed yesterday and SLP recommended patient be on a soft and bite size with gravy diet and thickened liquids.  Patient states he has been tolerating the diet okay without any noted choking, but they thickened liquids are upsetting his stomach.  Planning for esophagram next week.  Patient also reports he is pain is not well controlled at this time.  We will adjust pain regimen today as able.    OBJECTIVE/PHYSICAL EXAM     VITAL SIGNS (MOST RECENT):  Temp: 98.4 °F (36.9 °C) (04/19/24 0700)  Pulse: 92 (04/19/24 0700)  Resp: 18 (04/19/24 0811)  BP: (!) 141/70 (04/19/24 0700)  SpO2: 98 % (04/19/24 0700) VITAL SIGNS (24 HOUR RANGE):  Temp:  [97.5 °F (36.4 °C)-98.4 °F (36.9 °C)] 98.4 °F (36.9 °C)  Pulse:  [] 92  Resp:  " [18-19] 18  SpO2:  [95 %-98 %] 98 %  BP: (141-158)/(70-82) 141/70     GENERAL: In no acute distress, afebrile  HEENT:  Atraumatic, normocephalic, moist mucous membranes  CHEST: Clear to auscultation bilaterally  HEART: S1, S2, no appreciable murmur  ABDOMEN: Soft, nontender, BS +  MSK: Warm, no lower extremity edema, RA changes throughout   NEUROLOGIC: Alert and oriented x4, moving all extremities   INTEGUMENTARY: Right hip incision without erythema or drainage   PSYCHIATRY: Appropriate mood and affect     ASSESSMENT/PLAN     S/p fall at home resulting in a right intertrochanteric femur fracture  Underwent IM nailing of right intertrochanteric femur fracture on 04/11/2024 with Dr. Tatum  WBAT to RLE, full ROM   Lovenox for DVT prevention, and orthopedics recommends aspirin 81 mg b.i.d. upon discharge, but patient is allergic.  PT/OT  Follow-up with Dr. Tatum on 04/30/2024 at 0800  Pain control as needed - starting Tylenol No. 3 today, adding lidocaine patch    Dysphagia  MBS on 04/18/2024 - SLP recommended soft and bite size diet with gravy and mildly thick liquids  Planning for esophagram next week      Post-op anemia   Stable     HTN   Reports newly diagnosed   Currently receiving Lisinopril 40mg QD     Rheumatoid arthritis, osteoarthritis  Continue home methylprednisone 4 mg daily - increasing to 8mg QD x3 days for better pain control   PPI during admission     DVT prophylaxis: Lovenox     Anticipated discharge and disposition: Continue PT/OT  __________________________________________________________________________    LABS/MICRO/MEDS/DIAGNOSTICS     LABS  Recent Labs     04/18/24  0420      K 3.8   CHLORIDE 103   CO2 28   BUN 14.6   CREATININE 0.74   GLUCOSE 94   CALCIUM 8.9     Recent Labs     04/18/24  0420   WBC 8.76   RBC 3.47*   HCT 32.0*   MCV 92.2        MICROBIOLOGY  Microbiology Results (last 7 days)       ** No results found for the last 168 hours. **             MEDICATIONS  Current  "Facility-Administered Medications   Medication Dose Route Frequency    calcium-vitamin D3  1 tablet Oral BID    enoxparin  40 mg Subcutaneous Daily    famotidine  20 mg Oral BID    LIDOcaine  1 patch Transdermal Q24H    lisinopriL  40 mg Oral Daily    methocarbamoL  750 mg Oral TID    methylPREDNISolone  4 mg Oral Daily    senna-docusate 8.6-50 mg  2 tablet Oral BID         INFUSIONS  Current Facility-Administered Medications   Medication Dose Route Frequency Last Rate Last Admin        DIAGNOSTIC TESTS  Fl Modified Barium Swallow Speech   Final Result           No results found for: "EF"     NUTRITION STATUS  Patient meets ASPEN criteria for moderate malnutrition of acute illness or injury per RD assessment as evidenced by:  Energy Intake (Malnutrition): less than 75% for greater than 7 days  Weight Loss (Malnutrition): other (see comments) (-5.97% in recent months. Reported 10# wt loss pt stated)  Subcutaneous Fat (Malnutrition): moderate depletion  Muscle Mass (Malnutrition): moderate depletion           A minimum of two characteristics is recommended for diagnosis of either severe or non-severe malnutrition.     Case related differential diagnoses have been reviewed; assessment and plan has been documented. I have personally reviewed the labs and test results that are currently available; I have reviewed the patients medication list. I have reviewed the consulting providers recommendations. I have reviewed or attempted to review medical records based upon their availability.  All of the patient's and/or family's questions have been addressed and answered to the best of my ability.  I will continue to monitor closely and make adjustments to medical management as needed.  This document was created using M*Modal Fluency Direct.  Transcription errors may have been made.  Please contact me if any questions may rise regarding documentation to clarify transcription.      JOEY Brooks   Internal " Medicine  Department of Hospital Medicine [facility]

## 2024-04-19 NOTE — PT/OT/SLP PROGRESS
Physical Therapy Treatment Note           Name: Terence Tanner Jr.    : 1945 (79 y.o.)  MRN: [id number]           TREATMENT SUMMARY AND RECOMMENDATIONS:    PT Received On: 24  PT Start Time: 925     PT Stop Time: 955  PT Total Time (min): 30 min     Subjective Assessment:   No complaints  Lethargic   x Awake, alert, cooperative  Uncooperative   x Agitated x c/o pain    Appropriate x c/o fatigue    Confused  Treated at bedside     Emotionally labile x Treated in gym/dept.    Impulsive  Other:    Flat affect       Therapy Precautions:    Cognitive deficits  Spinal precautions    Collar - hard  Sternal precautions    Collar - soft   TLSO   x Fall risk  LSO    Hip precautions - posterior  Knee immobilizer    Hip precautions - anterior x WBAT    Impaired communication  Partial weightbearing    Oxygen  TTWB    PEG tube  NWB    Visual deficits x Other: R knee brace    Hearing deficits          Treatment Objectives:     Mobility Training:   Assist level Comments    Bed mobility SBA Sit>Supine; assistance with R LE   Transfer CGA Stand pivot bed<>WC, WC<>Toilet   Gait CGA 2 x 15 feet using RW   Sit to stand transitions CGA/SBA From EOB, toilet and WC   Sitting balance     Standing balance  CGA For hygiene tasks post BM   Wheelchair mobility     Car transfer     Other:          Therapeutic Exercise:   Exercise Sets Reps Comments                               Additional Comments:  Pt reported PT over did it yesterday and he wasn't doing LE exercises today - PT explained we only did a 1/2 session in PM due to pain, he reported he knew that but it was still too much. Focus placed on functional tasks this AM. Added cushion to WC to help improve sitting tolerance.     Assessment: Patient tolerated session Fair; impacted by pain. PT to progress as able. .    PT Plan: continue per POC  Revisions made to plan of care: No    GOALS:   Multidisciplinary Problems       Physical Therapy Goals          Problem:  Physical Therapy    Goal Priority Disciplines Outcome Goal Variances Interventions   Physical Therapy Goal     PT, PT/OT Ongoing, Progressing     Description: Goals to be met by: Discharge      Patient will increase functional independence with mobility by performin. Sit to stand transfer with Modified [place]  2. Bed to chair transfer with Modified [place] using Rolling Walker  3. Gait  x at least 50 feet with Modified [place] and up to 175 feet with SBA using Rolling Walker.   4. Increased functional strength to 4/5 for R LE  5. Supine<>Sit with MOD I.                         Skilled PT Minutes Provided: 30   Communication with Treatment Team:     Equipment recommendations:       At end of treatment, patient remained:  x Up in chair     Up in wheelchair in room    In bed    With alarm activated    Bed rails up    Call bell in reach     Family/friends present    Restraints secured properly    In bathroom with CNA/RN notified    Nurse aware   x In gym with therapist/tech    Other:

## 2024-04-19 NOTE — PT/OT/SLP PROGRESS
Occupational Therapy  Treatment    Name: Terence Tanner Jr.    : 1945 (79 y.o.)  MRN: [id number]           TREATMENT SUMMARY AND RECOMMENDATIONS:      OT Date of Treatment: 24  OT Start Time: 958  OT Stop Time:   OT Total Time (min): 27 min      Subjective Assessment:   No complaints  Lethargic   X Awake, alert, cooperative  Impulsive    Uncooperative   Flat affect    Agitated X c/o pain    Appropriate  c/o fatigue    Confused  Treated at bedside     Emotionally labile X Treated in gym/dept.      Other:        Therapy Precautions:    Cognitive deficits  Spinal precautions    Collar - hard  Sternal precautions    Collar - soft   TLSO   X Fall risk  LSO    Hip precautions - posterior  Knee immobilizer    Hip precautions - anterior X WBAT RLE    Impaired communication  Partial weightbearing    Oxygen  TTWB    PEG tube  NWB    Visual deficits      Hearing deficits  X Other: ROMAT RLE       Treatment Objectives:     Mobility Training:    Mobility task Assist level Comments    Bed mobility     Transfer Min A Modified squat pivot t/f w/c>recliner   Sit to stands transitions     Functional mobility     Sitting balance GOOD Pt seated in w/c performed fxnl reaching task to grasp objects placed laterally and release onto target placed slightly outside SHARMILA anteriorly. Emphasis on ant pelvic tilt and fxnl reaching.    Standing balance      Other:          Therapeutic Exercise:   Exercise Sets Reps Comments   UBE 2 5' F/B Mod resistance  Min length r/bs PRN                         Assessment: Patient tolerated session fairly well. Pt demonstrates improvements in fxnl t/fs and was agreeable to sit UIC following tx session. Pt continues to demonstrate deficits in fxnl endurance, fxnl ax tolerance, FM, pain,  strength, FMC, postural strength, and core strength impacting occupational performance. Pt would continue to benefit from skilled OT services to improve pt's safety and independence with daily occupations,  decrease caregiver burden, and reduce pt's risk of falls.     GOALS:   Multidisciplinary Problems       Occupational Therapy Goals          Problem: Occupational Therapy    Goal Priority Disciplines Outcome Interventions   Occupational Therapy Goal     OT, PT/OT Ongoing, Progressing    Description: Goals to be met by: 5/17/2024     Patient will increase functional independence with ADLs by performing:    LE Dressing with Stand-by Assistance.  Grooming while standing at sink with Stand-by Assistance.  Toileting from toilet with Stand-by Assistance for hygiene and clothing management.   Toilet transfer to toilet with Stand-by Assistance.                         Recommendations:     Discharge Equipment Recommendations:  none     Plan:     Patient to be seen  (5-6x/week (QD-BID)) to address the above listed problems via self-care/home management, neuromuscular re-education, therapeutic activities, therapeutic exercises  Plan of Care Expires: 05/17/24  Plan of Care Reviewed with: patient, spouse  Revisions made to plan of care: No      Skilled OT Minutes Provided: 27  Communication with Treatment Team:     Equipment recommendations:       At end of treatment, patient remained:  X Up in chair     Up in wheelchair in room    In bed   X With alarm activated    Bed rails up   X Call bell in reach     Family/friends present    Restraints secured properly    In bathroom with CNA/RN notified    In gym with PT/PTA/tech    Nurse aware    Other:

## 2024-04-20 PROCEDURE — 11000004 HC SNF PRIVATE

## 2024-04-20 PROCEDURE — 25000003 PHARM REV CODE 250: Performed by: STUDENT IN AN ORGANIZED HEALTH CARE EDUCATION/TRAINING PROGRAM

## 2024-04-20 PROCEDURE — 97530 THERAPEUTIC ACTIVITIES: CPT

## 2024-04-20 PROCEDURE — 99900035 HC TECH TIME PER 15 MIN (STAT)

## 2024-04-20 PROCEDURE — 63600175 PHARM REV CODE 636 W HCPCS: Performed by: PHYSICIAN ASSISTANT

## 2024-04-20 PROCEDURE — 25000003 PHARM REV CODE 250: Performed by: PHYSICIAN ASSISTANT

## 2024-04-20 PROCEDURE — 97116 GAIT TRAINING THERAPY: CPT

## 2024-04-20 PROCEDURE — 94760 N-INVAS EAR/PLS OXIMETRY 1: CPT

## 2024-04-20 RX ADMIN — Medication 1 TABLET: at 08:04

## 2024-04-20 RX ADMIN — SENNOSIDES AND DOCUSATE SODIUM 2 TABLET: 8.6; 5 TABLET ORAL at 08:04

## 2024-04-20 RX ADMIN — METHYLPREDNISOLONE 4 MG: 4 TABLET ORAL at 08:04

## 2024-04-20 RX ADMIN — LISINOPRIL 40 MG: 20 TABLET ORAL at 08:04

## 2024-04-20 RX ADMIN — METHOCARBAMOL 750 MG: 750 TABLET ORAL at 08:04

## 2024-04-20 RX ADMIN — LIDOCAINE 5% 1 PATCH: 700 PATCH TOPICAL at 09:04

## 2024-04-20 RX ADMIN — METHOCARBAMOL 750 MG: 750 TABLET ORAL at 03:04

## 2024-04-20 RX ADMIN — ENOXAPARIN SODIUM 40 MG: 40 INJECTION SUBCUTANEOUS at 04:04

## 2024-04-20 RX ADMIN — FAMOTIDINE 20 MG: 20 TABLET, FILM COATED ORAL at 08:04

## 2024-04-20 RX ADMIN — ACETAMINOPHEN AND CODEINE PHOSPHATE 1 TABLET: 300; 30 TABLET ORAL at 08:04

## 2024-04-20 NOTE — NURSING
"Received a call from Marcie Mar. ([phone]) Daughter in law of this patient. She had questions about the patients chart but could not provide an MRN number. Both the nurse Giana and I told her without an MRN number we could not give any patient information. She then proceed to argue saying she has never heard of this because she is a surgeon and wanted to speak with the charge nurse. I made her aware that I was the charge nurse. She then started to threaten us saying "you dont want my  who has ptsd to come over there."  I immediately told her if she continues to make threats I would get security and the police involved. Security was made aware of the situation.       She told me that we are going to "kill" her father in law because his heart rate was 97. She also stated that she was informed that he was not doing well. I let her know that the doctor is aware of his vital signs and he is stable right now with no complaints. (Giana went in to check on the patient during our conversation and informed me patient has no complaints at this time, vs WDL, patient smiling)        She proceed to try to get information about the patient but again we reminded her about not having the MRN number. She asked to notify someone to put her name on the patients chart but I did inform her the person who does this is not available until the morning. She then stated she would call the patient advocate and asked if we were new nurse because we "dont know what we are talking about and does not believe a word we say" She wanted to speak with someone above me so I notified the AOC on call.    Case management and nurse manager aware.       The night nurse when ask the patient if we could give information. The patient stated "yes but she will be a pain in the ass"      I discussed the patients VS with marcie (per the okay from patient) and she was okay with the information. I told her we could place the patient on telemetry and " monitor him throughout the night.     She said she and her  would [place] the [place].

## 2024-04-20 NOTE — PLAN OF CARE
Problem: Adult Inpatient Plan of Care  Goal: Plan of Care Review  Outcome: Ongoing, Progressing  Flowsheets (Taken 4/20/2024 0846)  Plan of Care Reviewed With:   patient   spouse  Goal: Patient-Specific Goal (Individualized)  Outcome: Ongoing, Progressing  Flowsheets (Taken 4/20/2024 0846)  Anxieties, Fears or Concerns: Right leg pain  Individualized Care Needs: pain management, NPO pending MBS results, safety with transfers to [facility], meds crushed in applesauce  Goal: Absence of Hospital-Acquired Illness or Injury  Outcome: Ongoing, Progressing  Intervention: Identify and Manage Fall Risk  Flowsheets (Taken 4/20/2024 0846)  Safety Promotion/Fall Prevention:   assistive device/personal item within reach   bed alarm set   medications reviewed   nonskid shoes/socks when out of bed   side rails raised x 3   instructed to call staff for mobility  Intervention: Prevent Skin Injury  Flowsheets (Taken 4/20/2024 0846)  Body Position:   position changed independently   log-rolled   weight shifting   sitting up in bed   supine  Skin Protection:   adhesive use limited   incontinence pads utilized   transparent dressing maintained   tubing/devices free from skin contact  Intervention: Prevent and Manage VTE (Venous Thromboembolism) Risk  Flowsheets (Taken 4/20/2024 0846)  Activity Management: Rolling - L1  VTE Prevention/Management:   bleeding precations maintained   bleeding risk assessed  Intervention: Prevent Infection  Flowsheets (Taken 4/20/2024 0846)  Infection Prevention:   cohorting utilized   environmental surveillance performed   equipment surfaces disinfected   hand hygiene promoted   single patient room provided   rest/sleep promoted  Goal: Optimal Comfort and Wellbeing  Outcome: Ongoing, Progressing  Intervention: Monitor Pain and Promote Comfort  Flowsheets (Taken 4/20/2024 0846)  Pain Management Interventions:   care clustered   pillow support provided   position adjusted   medication offered  Intervention:  Provide Person-Centered Care  Flowsheets (Taken 4/20/2024 0846)  Trust Relationship/Rapport:   care explained   questions answered   questions encouraged  Goal: Readiness for Transition of Care  Outcome: Ongoing, Progressing     Problem: Infection  Goal: Absence of Infection Signs and Symptoms  Outcome: Ongoing, Progressing  Intervention: Prevent or Manage Infection  Flowsheets (Taken 4/20/2024 0846)  Infection Management: aseptic technique maintained     Problem: Fall Injury Risk  Goal: Absence of Fall and Fall-Related Injury  Outcome: Ongoing, Progressing  Intervention: Identify and Manage Contributors  Flowsheets (Taken 4/20/2024 0846)  Self-Care Promotion:   independence encouraged   BADL personal objects within reach   meal set-up provided   safe use of adaptive equipment encouraged  Medication Review/Management: medications reviewed  Intervention: Promote Injury-Free Environment  Flowsheets (Taken 4/20/2024 0846)  Safety Promotion/Fall Prevention:   assistive device/personal item within reach   bed alarm set   medications reviewed   nonskid shoes/socks when out of bed   side rails raised x 3   instructed to call staff for mobility     Problem: Swallowing Impairment  Goal: Optimal Eating and Swallowing Without Aspiration  Outcome: Ongoing, Progressing  Intervention: Optimize Eating and Swallowing  Flowsheets (Taken 4/20/2024 0846)  Aspiration Precautions: awake/alert before oral intake     Problem: Hypertension Comorbidity  Goal: Blood Pressure in Desired Range  Outcome: Ongoing, Progressing  Intervention: Maintain Blood Pressure Management  Flowsheets (Taken 4/20/2024 0846)  Medication Review/Management: medications reviewed     Problem: Osteoarthritis Comorbidity  Goal: Maintenance of Osteoarthritis Symptom Control  Outcome: Ongoing, Progressing  Intervention: Maintain Osteoarthritis Symptom Control  Flowsheets (Taken 4/20/2024 0846)  Activity Management: Rolling - L1  Medication Review/Management:  medications reviewed     Problem: Pain Chronic (Persistent) (Comorbidity Management)  Goal: Acceptable Pain Control and Functional Ability  Outcome: Ongoing, Progressing  Intervention: Develop Pain Management Plan  Flowsheets (Taken 4/20/2024 0846)  Pain Management Interventions:   care clustered   pillow support provided   position adjusted   medication offered  Intervention: Manage Persistent Pain  Flowsheets (Taken 4/20/2024 0846)  Bowel Elimination Promotion:   adequate fluid intake promoted   ambulation promoted  Medication Review/Management: medications reviewed

## 2024-04-20 NOTE — PT/OT/SLP PROGRESS
Physical Therapy Treatment Note           Name: Terence Tanner Jr.    : 1945 (79 y.o.)  MRN: [id number]           TREATMENT SUMMARY AND RECOMMENDATIONS:    PT Received On: 24  PT Start Time: 925     PT Stop Time: 948  PT Total Time (min): 23 min     Subjective Assessment:   No complaints  Lethargic   x Awake, alert, cooperative  Uncooperative   x Agitated x c/o pain    Appropriate x c/o fatigue    Confused x Treated at bedside     Emotionally labile  Treated in gym/dept.    Impulsive  Other:    Flat affect       Therapy Precautions:    Cognitive deficits  Spinal precautions    Collar - hard  Sternal precautions    Collar - soft   TLSO   x Fall risk  LSO    Hip precautions - posterior  Knee immobilizer    Hip precautions - anterior x WBAT    Impaired communication  Partial weightbearing    Oxygen  TTWB    PEG tube  NWB    Visual deficits x Other: R knee brace    Hearing deficits          Treatment Objectives:     Mobility Training:   Assist level Comments    Bed mobility MIN A  SBA Sit>Supine; assistance with R LE  Supine>Sit   Transfer CGA Stand pivot bed<>WC   Gait CGA 2 x 30 feet using RW   Sit to stand transitions CGA/SBA From EOB and WC   Sitting balance SBA For dressing tasks EOB   Standing balance      Wheelchair mobility     Car transfer     Other:          Therapeutic Exercise:   Exercise Sets Reps Comments   Bridges   3 For lower body dressing                         Additional Comments:  Pt reports today is going to be a bad day because of how he slept last night. Pt was complaining about not getting a bath so PT offered to provide one, but pt refused. Pt did agree to change clothes. Impacted by pain     Assessment: Patient tolerated session Fair; impacted by pain. PT to progress as able. .    PT Plan: continue per POC  Revisions made to plan of care: No    GOALS:   Multidisciplinary Problems       Physical Therapy Goals          Problem: Physical Therapy    Goal Priority Disciplines  Outcome Goal Variances Interventions   Physical Therapy Goal     PT, PT/OT Ongoing, Progressing     Description: Goals to be met by: Discharge      Patient will increase functional independence with mobility by performin. Sit to stand transfer with Modified [place]  2. Bed to chair transfer with Modified [place] using Rolling Walker  3. Gait  x at least 50 feet with Modified [place] and up to 175 feet with SBA using Rolling Walker.   4. Increased functional strength to 4/5 for R LE  5. Supine<>Sit with MOD I.                         Skilled PT Minutes Provided: 23   Communication with Treatment Team:     Equipment recommendations:       At end of treatment, patient remained:   Up in chair     Up in wheelchair in room   x In bed   x With alarm activated   x Bed rails up   x Call bell in reach     Family/friends present    Restraints secured properly    In bathroom with CNA/RN notified   x Nurse aware    In gym with therapist/tech    Other:

## 2024-04-20 NOTE — PROGRESS NOTES
"[facility]  [facility] MEDICINE ~ PROGRESS NOTE    CHIEF COMPLAINT     Hospital follow up    HOSPITAL COURSE     Patient is 79 years old male with a past medical history of rheumatoid arthritis, osteoarthritis, osteopenia, hx of partial colon resection ("black spot on colon" - reportedly non cancerous), and newly diagnosed HTN who initially presented to [facility] s/p fall at home resulting in a right intertrochanteric femur fracture. Patient subsequently underwent IM nailing of right intertrochanteric femur fracture on 04/11/2024 with Dr. Tatum.  Patient was noted to have elevated blood pressure readings during admission therefore was initiated on lisinopril, currently receiving 40 mg daily. Patient was transferred to our swing unit for continued therapy.  On exam today, patient denies any complaints.  He does endorse an episode of choking while eating breakfast this morning but contributes this to the dry food.  Will obtain SLP eval prior to initiating diet.  At baseline, patient does use an assistive device to walk, mainly Rollator.  He also has a powered wheelchair, a walker, a ramp at home, and lives with his wife who does assist with ADLs as needed.     4/19  MBS completed yesterday and SLP recommended patient be on a soft and bite size with gravy diet and thickened liquids.  Patient states he has been tolerating the diet okay without any noted choking, but they thickened liquids are upsetting his stomach.  Planning for esophagram next week.  Patient also reports he is pain is not well controlled at this time.  We will adjust pain regimen today as able.    4/20  Please see we will Aria.s  note from 04/19.  Patient was placed on tele due to the daughter-in-law said about his pulse.  His pulse does fluctuate usually with pain.  Patient was very upset this morning that he had to be on tele overnight.  He was of sound mind.  He said the worst bothered him all night.  I do not see an " indication for him to need to be on tele.  We check vitals q.4 hours.  His heart rate we will fluctuate based on pain and exercise.  He will be going to therapy today.  He so far since being here has been doing well.  I do have to respect the patient's wishes.  He states his pain is slowly becoming better controlled.    OBJECTIVE/PHYSICAL EXAM     VITAL SIGNS (MOST RECENT):  Temp: 97.9 °F (36.6 °C) (04/20/24 0730)  Pulse: 99 (04/20/24 0730)  Resp: 18 (04/20/24 0812)  BP: (!) 147/68 (04/20/24 0730)  SpO2: 98 % (04/20/24 0730) VITAL SIGNS (24 HOUR RANGE):  Temp:  [97.5 °F (36.4 °C)-98.1 °F (36.7 °C)] 97.9 °F (36.6 °C)  Pulse:  [] 99  Resp:  [18] 18  SpO2:  [96 %-99 %] 98 %  BP: (108-164)/(61-77) 147/68     GENERAL: In no acute distress, afebrile  HEENT: Atraumatic, normocephalic,  CHEST: Unlabored  HEART: S1, S2  ABDOMEN: Non distended      ASSESSMENT/PLAN     S/p fall at home resulting in a right intertrochanteric femur fracture  Underwent IM nailing of right intertrochanteric femur fracture on 04/11/2024 with Dr. Tatum  WBAT to RLE, full ROM   Lovenox for DVT prevention, and orthopedics recommends aspirin 81 mg b.i.d. upon discharge, but patient is allergic.  PT/OT  Follow-up with Dr. Tatum on 04/30/2024 at 0800  Pain control as needed - starting Tylenol No. 3 today, adding lidocaine patch    Dysphagia  MBS on 04/18/2024 - SLP recommended soft and bite size diet with gravy and mildly thick liquids  Planning for esophagram next week      Post-op anemia   Stable     HTN   Reports newly diagnosed   Currently receiving Lisinopril 40mg QD     Rheumatoid arthritis, osteoarthritis  Continue home methylprednisone 4 mg daily - increasing to 8mg QD x3 days for better pain control   PPI during admission     DVT prophylaxis: Lovenox     Anticipated discharge and disposition: Continue PT/OT  __________________________________________________________________________    LABS/MICRO/MEDS/DIAGNOSTICS     LABS  Recent Labs      "04/18/24  0420      K 3.8   CHLORIDE 103   CO2 28   BUN 14.6   CREATININE 0.74   GLUCOSE 94   CALCIUM 8.9     Recent Labs     04/18/24 0420   WBC 8.76   RBC 3.47*   HCT 32.0*   MCV 92.2        MICROBIOLOGY  Microbiology Results (last 7 days)       ** No results found for the last 168 hours. **             MEDICATIONS  Current Facility-Administered Medications   Medication Dose Route Frequency    calcium-vitamin D3  1 tablet Oral BID    enoxparin  40 mg Subcutaneous Daily    famotidine  20 mg Oral BID    LIDOcaine  1 patch Transdermal Q24H    lisinopriL  40 mg Oral Daily    methocarbamoL  750 mg Oral TID    senna-docusate 8.6-50 mg  2 tablet Oral BID         INFUSIONS  Current Facility-Administered Medications   Medication Dose Route Frequency Last Rate Last Admin        DIAGNOSTIC TESTS  Fl Modified Barium Swallow Speech   Final Result           No results found for: "EF"     NUTRITION STATUS  Patient meets ASPEN criteria for moderate malnutrition of acute illness or injury per RD assessment as evidenced by:  Energy Intake (Malnutrition): less than 75% for greater than 7 days  Weight Loss (Malnutrition): other (see comments) (-5.97% in recent months. Reported 10# wt loss pt stated)  Subcutaneous Fat (Malnutrition): moderate depletion  Muscle Mass (Malnutrition): moderate depletion           A minimum of two characteristics is recommended for diagnosis of either severe or non-severe malnutrition.     Case related differential diagnoses have been reviewed; assessment and plan has been documented. I have personally reviewed the labs and test results that are currently available; I have reviewed the patients medication list. I have reviewed the consulting providers recommendations. I have reviewed or attempted to review medical records based upon their availability.  All of the patient's and/or family's questions have been addressed and answered to the best of my ability.  I will continue to monitor " closely and make adjustments to medical management as needed.  This document was created using M*Modal Fluency Direct.  Transcription errors may have been made.  Please contact me if any questions may rise regarding documentation to clarify transcription.      Indira Galdamez MD   Internal Medicine  Department of Hospital Medicine [facility]

## 2024-04-20 NOTE — PLAN OF CARE
Problem: Adult Inpatient Plan of Care  Goal: Plan of Care Review  Outcome: Ongoing, Progressing  Flowsheets (Taken 4/19/2024 2000)  Plan of Care Reviewed With: patient  Goal: Patient-Specific Goal (Individualized)  Outcome: Ongoing, Progressing  Flowsheets (Taken 4/19/2024 2000)  Anxieties, Fears or Concerns: Pt denies any pain or discomfort at this time  Individualized Care Needs: Pain management, Supervision with PO intake, Safety with mobility, Meds crushed in applesauce  Goal: Absence of Hospital-Acquired Illness or Injury  Outcome: Ongoing, Progressing  Intervention: Prevent Skin Injury  Flowsheets (Taken 4/19/2024 2000)  Skin Protection:   adhesive use limited   incontinence pads utilized   tubing/devices free from skin contact  Intervention: Prevent and Manage VTE (Venous Thromboembolism) Risk  Flowsheets (Taken 4/19/2024 2000)  VTE Prevention/Management:   bleeding risk assessed   bleeding precations maintained   ROM (active) performed   fluids promoted  Range of Motion: active ROM (range of motion) encouraged  Intervention: Prevent Infection  Flowsheets (Taken 4/19/2024 2000)  Infection Prevention:   environmental surveillance performed   cohorting utilized   equipment surfaces disinfected   hand hygiene promoted   rest/sleep promoted   single patient room provided     Problem: Fall Injury Risk  Goal: Absence of Fall and Fall-Related Injury  Outcome: Ongoing, Progressing  Intervention: Identify and Manage Contributors  Flowsheets (Taken 4/19/2024 2000)  Self-Care Promotion:   independence encouraged   BADL personal objects within reach   meal set-up provided   safe use of adaptive equipment encouraged  Medication Review/Management: medications reviewed     Problem: Swallowing Impairment  Goal: Optimal Eating and Swallowing Without Aspiration  Outcome: Ongoing, Progressing  Intervention: Optimize Eating and Swallowing  Flowsheets (Taken 4/19/2024 2000)  Aspiration Precautions:   awake/alert before oral  intake   upright posture maintained   distractions minimized during oral intake   liquids thickened   respiratory status monitored   oral hygiene care promoted  Swallowing Interventions: Dysphagia:   food and liquid intake alternated   small bites/sips encouraged     Problem: Pain Chronic (Persistent) (Comorbidity Management)  Goal: Acceptable Pain Control and Functional Ability  Outcome: Ongoing, Progressing  Intervention: Develop Pain Management Plan  Flowsheets (Taken 4/19/2024 2000)  Pain Management Interventions:   care clustered   diversional activity provided   medication offered but refused   pain management plan reviewed with patient/caregiver   pillow support provided   position adjusted   quiet environment facilitated   relaxation techniques promoted  Intervention: Manage Persistent Pain  Flowsheets (Taken 4/19/2024 2000)  Sleep/Rest Enhancement:   awakenings minimized   consistent schedule promoted   noise level reduced   relaxation techniques promoted   regular sleep/rest pattern promoted   room darkened  Bowel Elimination Promotion: adequate fluid intake promoted  Medication Review/Management: medications reviewed  Intervention: Optimize Psychosocial Wellbeing  Flowsheets (Taken 4/19/2024 2000)  Supportive Measures:   active listening utilized   verbalization of feelings encouraged   positive reinforcement provided   self-care encouraged  Diversional Activities:   television   smartphone

## 2024-04-20 NOTE — NURSING
"Received call from Marcie Garciaosa ([phone]). States, "she is patient's daughter in law and she is calling with patient's wife, Amelia sitting next to her concerned about his vital signs". I requested patient's MRN number and she could not provide it. Informed her that I am unable to provide any patient information to her over the phone without an MRN number (Aria also spoke to her and told her this) and that his wife, Amelia is the only person on his chart as a point of contact. She also states, "this situation is only going to get worse for you".    Marcie Mar proceeds to ask for the patient advocate of this hospital because we don't know what we are talking about. She also asked to speak to the charge nurse, Aria proceeds to have a conversation with her. During this time I went to check on the patient. He denies any discomfort or needs at this time. He is calm and cooperative without any signs of distress noted. I asked him if it was okay to give information to his daughter in law, Marcie over the phone. Patient states, "yes but she will be a pain in the ass".     "

## 2024-04-21 PROCEDURE — 99900035 HC TECH TIME PER 15 MIN (STAT)

## 2024-04-21 PROCEDURE — 94760 N-INVAS EAR/PLS OXIMETRY 1: CPT

## 2024-04-21 PROCEDURE — 25000003 PHARM REV CODE 250: Performed by: INTERNAL MEDICINE

## 2024-04-21 PROCEDURE — 25000003 PHARM REV CODE 250: Performed by: STUDENT IN AN ORGANIZED HEALTH CARE EDUCATION/TRAINING PROGRAM

## 2024-04-21 PROCEDURE — 63600175 PHARM REV CODE 636 W HCPCS: Performed by: PHYSICIAN ASSISTANT

## 2024-04-21 PROCEDURE — 25000003 PHARM REV CODE 250: Performed by: PHYSICIAN ASSISTANT

## 2024-04-21 PROCEDURE — 11000004 HC SNF PRIVATE

## 2024-04-21 RX ORDER — IPRATROPIUM BROMIDE 0.5 MG/2.5ML
0.5 SOLUTION RESPIRATORY (INHALATION) 4 TIMES DAILY PRN
Status: DISCONTINUED | OUTPATIENT
Start: 2024-04-21 | End: 2024-05-01 | Stop reason: HOSPADM

## 2024-04-21 RX ORDER — LIDOCAINE 50 MG/G
1 PATCH TOPICAL
Status: DISCONTINUED | OUTPATIENT
Start: 2024-04-21 | End: 2024-05-01 | Stop reason: HOSPADM

## 2024-04-21 RX ADMIN — Medication 1 TABLET: at 08:04

## 2024-04-21 RX ADMIN — FAMOTIDINE 20 MG: 20 TABLET, FILM COATED ORAL at 08:04

## 2024-04-21 RX ADMIN — ENOXAPARIN SODIUM 40 MG: 40 INJECTION SUBCUTANEOUS at 04:04

## 2024-04-21 RX ADMIN — ACETAMINOPHEN AND CODEINE PHOSPHATE 1 TABLET: 300; 30 TABLET ORAL at 11:04

## 2024-04-21 RX ADMIN — METHOCARBAMOL 750 MG: 750 TABLET ORAL at 08:04

## 2024-04-21 RX ADMIN — LIDOCAINE 5% 1 PATCH: 700 PATCH TOPICAL at 01:04

## 2024-04-21 RX ADMIN — LISINOPRIL 40 MG: 20 TABLET ORAL at 08:04

## 2024-04-21 RX ADMIN — LIDOCAINE 5% 1 PATCH: 700 PATCH TOPICAL at 10:04

## 2024-04-21 RX ADMIN — SENNOSIDES AND DOCUSATE SODIUM 2 TABLET: 8.6; 5 TABLET ORAL at 08:04

## 2024-04-21 RX ADMIN — ACETAMINOPHEN 650 MG: 325 TABLET ORAL at 08:04

## 2024-04-21 RX ADMIN — METHOCARBAMOL 750 MG: 750 TABLET ORAL at 02:04

## 2024-04-21 NOTE — PROGRESS NOTES
"[facility]  [facility] MEDICINE ~ PROGRESS NOTE    CHIEF COMPLAINT     Hospital follow up    HOSPITAL COURSE     Patient is 79 years old male with a past medical history of rheumatoid arthritis, osteoarthritis, osteopenia, hx of partial colon resection ("black spot on colon" - reportedly non cancerous), and newly diagnosed HTN who initially presented to [facility] s/p fall at home resulting in a right intertrochanteric femur fracture. Patient subsequently underwent IM nailing of right intertrochanteric femur fracture on 04/11/2024 with Dr. Tatum.  Patient was noted to have elevated blood pressure readings during admission therefore was initiated on lisinopril, currently receiving 40 mg daily. Patient was transferred to our swing unit for continued therapy.  On exam today, patient denies any complaints.  He does endorse an episode of choking while eating breakfast this morning but contributes this to the dry food.  Will obtain SLP eval prior to initiating diet.  At baseline, patient does use an assistive device to walk, mainly Rollator.  He also has a powered wheelchair, a walker, a ramp at home, and lives with his wife who does assist with ADLs as needed.     4/19  MBS completed yesterday and SLP recommended patient be on a soft and bite size with gravy diet and thickened liquids.  Patient states he has been tolerating the diet okay without any noted choking, but they thickened liquids are upsetting his stomach.  Planning for esophagram next week.  Patient also reports he is pain is not well controlled at this time.  We will adjust pain regimen today as able.    4/20  Please see we will Aria.s  note from 04/19.  Patient was placed on tele due to the daughter-in-law said about his pulse.  His pulse does fluctuate usually with pain.  Patient was very upset this morning that he had to be on tele overnight.  He was of sound mind.  He said the worst bothered him all night.  I do not see an " indication for him to need to be on tele.  We check vitals q.4 hours.  His heart rate we will fluctuate based on pain and exercise.  He will be going to therapy today.  He so far since being here has been doing well.  I do have to respect the patient's wishes.  He states his pain is slowly becoming better controlled.      4/21  No acute events overnight.     OBJECTIVE/PHYSICAL EXAM     VITAL SIGNS (MOST RECENT):  Temp: 97.8 °F (36.6 °C) (04/21/24 0729)  Pulse: 90 (04/21/24 0729)  Resp: 18 (04/21/24 0320)  BP: 116/75 (04/21/24 0729)  SpO2: 98 % (04/21/24 0729) VITAL SIGNS (24 HOUR RANGE):  Temp:  [97.8 °F (36.6 °C)-98.6 °F (37 °C)] 97.8 °F (36.6 °C)  Pulse:  [85-94] 90  Resp:  [18-19] 18  SpO2:  [96 %-99 %] 98 %  BP: (109-153)/(64-76) 116/75     GENERAL: In no acute distress, afebrile  HEENT: Atraumatic, normocephalic,  CHEST: Unlabored  HEART: S1, S2  ABDOMEN: Non distended      ASSESSMENT/PLAN     S/p fall at home resulting in a right intertrochanteric femur fracture  Underwent IM nailing of right intertrochanteric femur fracture on 04/11/2024 with Dr. Tatum  WBAT to RLE, full ROM   Lovenox for DVT prevention, and orthopedics recommends aspirin 81 mg b.i.d. upon discharge, but patient is allergic.  PT/OT  Follow-up with Dr. Tatum on 04/30/2024 at 0800  Pain control as needed - starting Tylenol No. 3 today, adding lidocaine patch    Dysphagia  MBS on 04/18/2024 - SLP recommended soft and bite size diet with gravy and mildly thick liquids  Planning for esophagram next week      Post-op anemia   Stable     HTN   Reports newly diagnosed   Currently receiving Lisinopril 40mg QD     Rheumatoid arthritis, osteoarthritis  Continue home methylprednisone 4 mg daily - increasing to 8mg QD x3 days for better pain control   PPI during admission     DVT prophylaxis: Lovenox     Anticipated discharge and disposition: Continue  "PT/OT  __________________________________________________________________________    LABS/MICRO/MEDS/DIAGNOSTICS     LABS  No results for input(s): "NA", "K", "CHLORIDE", "CO2", "BUN", "CREATININE", "GLUCOSE", "CALCIUM", "ALKPHOS", "AST", "ALT", "ALBUMIN" in the last 72 hours.    No results for input(s): "WBC", "RBC", "HCT", "MCV", "PLT" in the last 72 hours.    Invalid input(s): "HG"    MICROBIOLOGY  Microbiology Results (last 7 days)       ** No results found for the last 168 hours. **             MEDICATIONS  Current Facility-Administered Medications   Medication Dose Route Frequency    calcium-vitamin D3  1 tablet Oral BID    enoxparin  40 mg Subcutaneous Daily    famotidine  20 mg Oral BID    LIDOcaine  1 patch Transdermal Q24H    lisinopriL  40 mg Oral Daily    methocarbamoL  750 mg Oral TID    senna-docusate 8.6-50 mg  2 tablet Oral BID         INFUSIONS  Current Facility-Administered Medications   Medication Dose Route Frequency Last Rate Last Admin        DIAGNOSTIC TESTS  Fl Modified Barium Swallow Speech   Final Result           No results found for: "EF"     NUTRITION STATUS  Patient meets ASPEN criteria for moderate malnutrition of acute illness or injury per RD assessment as evidenced by:  Energy Intake (Malnutrition): less than 75% for greater than 7 days  Weight Loss (Malnutrition): other (see comments) (-5.97% in recent months. Reported 10# wt loss pt stated)  Subcutaneous Fat (Malnutrition): moderate depletion  Muscle Mass (Malnutrition): moderate depletion           A minimum of two characteristics is recommended for diagnosis of either severe or non-severe malnutrition.     Case related differential diagnoses have been reviewed; assessment and plan has been documented. I have personally reviewed the labs and test results that are currently available; I have reviewed the patients medication list. I have reviewed the consulting providers recommendations. I have reviewed or attempted to review " medical records based upon their availability.  All of the patient's and/or family's questions have been addressed and answered to the best of my ability.  I will continue to monitor closely and make adjustments to medical management as needed.  This document was created using M*Modal Fluency Direct.  Transcription errors may have been made.  Please contact me if any questions may rise regarding documentation to clarify transcription.      Indira Galdamez MD   Internal Medicine  Department of Hospital Medicine [facility]

## 2024-04-21 NOTE — PLAN OF CARE
[facility]  Discharge Reassessment    Primary Care Provider: No, Primary Doctor    Expected Discharge Date: 4/30/2024    Reassessment (most recent)       Discharge Reassessment - 04/21/24 1528          Discharge Reassessment    Assessment Type Discharge Planning Reassessment     Did the patient's condition or plan change since previous assessment? No     Discharge Plan discussed with: Spouse/sig other     Name(s) and Number(s) Amelia spouse      Communicated LIZABETH with patient/caregiver Date not available/Unable to determine     Discharge Plan A Home with family;Home Health     DME Needed Upon Discharge  none     Transition of Care Barriers None     Why the patient remains in the hospital Requires continued medical care        Post-Acute Status    Post-Acute Authorization Home Health     Home Health Status Pending medical clearance/testing     Hospital Resources/Appts/Education Provided Provided patient/caregiver with written discharge plan information     Discharge Delays None known at this time

## 2024-04-21 NOTE — PLAN OF CARE
Problem: Adult Inpatient Plan of Care  Goal: Plan of Care Review  Outcome: Ongoing, Progressing  Flowsheets (Taken 4/20/2024 1948)  Plan of Care Reviewed With: patient  Goal: Patient-Specific Goal (Individualized)  Outcome: Ongoing, Progressing  Flowsheets (Taken 4/20/2024 1948)  Anxieties, Fears or Concerns: Pt denies any pain or discomfort at this time  Individualized Care Needs: Pain management, Supervision with PO intake, Safety with mobility, Meds crushed in applesauce  Goal: Absence of Hospital-Acquired Illness or Injury  Outcome: Ongoing, Progressing  Intervention: Prevent Skin Injury  Flowsheets (Taken 4/20/2024 1948)  Skin Protection:   adhesive use limited   incontinence pads utilized   tubing/devices free from skin contact  Intervention: Prevent and Manage VTE (Venous Thromboembolism) Risk  Flowsheets (Taken 4/20/2024 1948)  VTE Prevention/Management:   bleeding risk assessed   bleeding precations maintained   ROM (active) performed   fluids promoted  Range of Motion: active ROM (range of motion) encouraged  Intervention: Prevent Infection  Flowsheets (Taken 4/20/2024 1948)  Infection Prevention:   environmental surveillance performed   cohorting utilized   equipment surfaces disinfected   hand hygiene promoted   rest/sleep promoted   single patient room provided     Problem: Fall Injury Risk  Goal: Absence of Fall and Fall-Related Injury  Outcome: Ongoing, Progressing  Intervention: Identify and Manage Contributors  Flowsheets (Taken 4/20/2024 1948)  Self-Care Promotion:   independence encouraged   BADL personal objects within reach   meal set-up provided   safe use of adaptive equipment encouraged  Medication Review/Management: medications reviewed     Problem: Swallowing Impairment  Goal: Optimal Eating and Swallowing Without Aspiration  Outcome: Ongoing, Progressing  Intervention: Optimize Eating and Swallowing  Flowsheets (Taken 4/20/2024 1948)  Aspiration Precautions:   awake/alert before oral  intake   upright posture maintained   distractions minimized during oral intake   liquids thickened   respiratory status monitored   oral hygiene care promoted  Swallowing Interventions: Dysphagia:   food and liquid intake alternated   small bites/sips encouraged     Problem: Pain Chronic (Persistent) (Comorbidity Management)  Goal: Acceptable Pain Control and Functional Ability  Outcome: Ongoing, Progressing  Intervention: Develop Pain Management Plan  Flowsheets (Taken 4/20/2024 1948)  Pain Management Interventions:   care clustered   diversional activity provided   medication offered but refused   pain management plan reviewed with patient/caregiver   pillow support provided   position adjusted   quiet environment facilitated   relaxation techniques promoted  Intervention: Manage Persistent Pain  Flowsheets (Taken 4/20/2024 1948)  Sleep/Rest Enhancement:   awakenings minimized   consistent schedule promoted   noise level reduced   relaxation techniques promoted   regular sleep/rest pattern promoted   room darkened  Bowel Elimination Promotion: adequate fluid intake promoted  Medication Review/Management: medications reviewed  Intervention: Optimize Psychosocial Wellbeing  Flowsheets (Taken 4/20/2024 1948)  Supportive Measures:   active listening utilized   verbalization of feelings encouraged   positive reinforcement provided   self-care encouraged  Diversional Activities:   television   smartphone

## 2024-04-21 NOTE — PLAN OF CARE
Problem: Adult Inpatient Plan of Care  Goal: Plan of Care Review  Outcome: Ongoing, Progressing  Flowsheets (Taken 4/21/2024 0748)  Plan of Care Reviewed With:   patient   spouse  Goal: Patient-Specific Goal (Individualized)  Outcome: Ongoing, Progressing  Flowsheets (Taken 4/21/2024 0748)  Anxieties, Fears or Concerns: Right leg pain  Individualized Care Needs: pain management, NPO pending MBS results, safety with transfers to [facility], meds crushed in applesauce  Goal: Absence of Hospital-Acquired Illness or Injury  Outcome: Ongoing, Progressing  Intervention: Identify and Manage Fall Risk  Flowsheets (Taken 4/21/2024 0748)  Safety Promotion/Fall Prevention:   assistive device/personal item within reach   bed alarm set   medications reviewed   nonskid shoes/socks when out of bed   side rails raised x 3   instructed to call staff for mobility  Intervention: Prevent Skin Injury  Flowsheets (Taken 4/21/2024 0748)  Body Position:   position changed independently   log-rolled   weight shifting   sitting up in bed   supine  Skin Protection:   adhesive use limited   incontinence pads utilized   transparent dressing maintained   tubing/devices free from skin contact  Intervention: Prevent and Manage VTE (Venous Thromboembolism) Risk  Flowsheets (Taken 4/21/2024 0748)  Activity Management: Rolling - L1  VTE Prevention/Management:   bleeding precations maintained   bleeding risk assessed  Intervention: Prevent Infection  Flowsheets (Taken 4/21/2024 0748)  Infection Prevention:   cohorting utilized   environmental surveillance performed   equipment surfaces disinfected   hand hygiene promoted   single patient room provided   rest/sleep promoted  Goal: Optimal Comfort and Wellbeing  Outcome: Ongoing, Progressing  Intervention: Monitor Pain and Promote Comfort  Flowsheets (Taken 4/21/2024 0748)  Pain Management Interventions:   care clustered   pillow support provided   position adjusted   medication offered  Intervention:  Provide Person-Centered Care  Flowsheets (Taken 4/21/2024 0748)  Trust Relationship/Rapport:   care explained   questions answered   questions encouraged  Goal: Readiness for Transition of Care  Outcome: Ongoing, Progressing     Problem: Infection  Goal: Absence of Infection Signs and Symptoms  Outcome: Ongoing, Progressing  Intervention: Prevent or Manage Infection  Flowsheets (Taken 4/21/2024 0748)  Infection Management: aseptic technique maintained     Problem: Fall Injury Risk  Goal: Absence of Fall and Fall-Related Injury  Outcome: Ongoing, Progressing  Intervention: Identify and Manage Contributors  Flowsheets (Taken 4/21/2024 0748)  Self-Care Promotion:   independence encouraged   BADL personal objects within reach   meal set-up provided   safe use of adaptive equipment encouraged  Medication Review/Management: medications reviewed  Intervention: Promote Injury-Free Environment  Flowsheets (Taken 4/21/2024 0748)  Safety Promotion/Fall Prevention:   assistive device/personal item within reach   bed alarm set   medications reviewed   nonskid shoes/socks when out of bed   side rails raised x 3   instructed to call staff for mobility     Problem: Swallowing Impairment  Goal: Optimal Eating and Swallowing Without Aspiration  Outcome: Ongoing, Progressing  Intervention: Optimize Eating and Swallowing  Flowsheets (Taken 4/21/2024 0748)  Aspiration Precautions: awake/alert before oral intake     Problem: Hypertension Comorbidity  Goal: Blood Pressure in Desired Range  Outcome: Ongoing, Progressing  Intervention: Maintain Blood Pressure Management  Flowsheets (Taken 4/21/2024 0748)  Medication Review/Management: medications reviewed     Problem: Osteoarthritis Comorbidity  Goal: Maintenance of Osteoarthritis Symptom Control  Outcome: Ongoing, Progressing  Intervention: Maintain Osteoarthritis Symptom Control  Flowsheets (Taken 4/21/2024 0748)  Activity Management: Rolling - L1  Medication Review/Management:  medications reviewed     Problem: Pain Chronic (Persistent) (Comorbidity Management)  Goal: Acceptable Pain Control and Functional Ability  Outcome: Ongoing, Progressing  Intervention: Develop Pain Management Plan  Flowsheets (Taken 4/21/2024 0748)  Pain Management Interventions:   care clustered   pillow support provided   position adjusted   medication offered  Intervention: Manage Persistent Pain  Flowsheets (Taken 4/21/2024 0748)  Bowel Elimination Promotion:   adequate fluid intake promoted   ambulation promoted  Medication Review/Management: medications reviewed

## 2024-04-22 PROCEDURE — 97530 THERAPEUTIC ACTIVITIES: CPT

## 2024-04-22 PROCEDURE — 97110 THERAPEUTIC EXERCISES: CPT

## 2024-04-22 PROCEDURE — 87077 CULTURE AEROBIC IDENTIFY: CPT | Performed by: INTERNAL MEDICINE

## 2024-04-22 PROCEDURE — 25000003 PHARM REV CODE 250: Performed by: INTERNAL MEDICINE

## 2024-04-22 PROCEDURE — 99900035 HC TECH TIME PER 15 MIN (STAT)

## 2024-04-22 PROCEDURE — 63600175 PHARM REV CODE 636 W HCPCS: Performed by: PHYSICIAN ASSISTANT

## 2024-04-22 PROCEDURE — 92526 ORAL FUNCTION THERAPY: CPT

## 2024-04-22 PROCEDURE — 11000004 HC SNF PRIVATE

## 2024-04-22 PROCEDURE — 94760 N-INVAS EAR/PLS OXIMETRY 1: CPT

## 2024-04-22 PROCEDURE — 25000003 PHARM REV CODE 250: Performed by: STUDENT IN AN ORGANIZED HEALTH CARE EDUCATION/TRAINING PROGRAM

## 2024-04-22 PROCEDURE — 25000003 PHARM REV CODE 250: Performed by: PHYSICIAN ASSISTANT

## 2024-04-22 RX ORDER — DOCUSATE SODIUM 100 MG/1
100 CAPSULE, LIQUID FILLED ORAL DAILY
Status: DISCONTINUED | OUTPATIENT
Start: 2024-04-23 | End: 2024-04-22

## 2024-04-22 RX ADMIN — FAMOTIDINE 20 MG: 20 TABLET, FILM COATED ORAL at 08:04

## 2024-04-22 RX ADMIN — METHOCARBAMOL 750 MG: 750 TABLET ORAL at 02:04

## 2024-04-22 RX ADMIN — LISINOPRIL 40 MG: 20 TABLET ORAL at 08:04

## 2024-04-22 RX ADMIN — ENOXAPARIN SODIUM 40 MG: 40 INJECTION SUBCUTANEOUS at 04:04

## 2024-04-22 RX ADMIN — LIDOCAINE 5% 1 PATCH: 700 PATCH TOPICAL at 01:04

## 2024-04-22 RX ADMIN — Medication 1 TABLET: at 08:04

## 2024-04-22 RX ADMIN — METHOCARBAMOL 750 MG: 750 TABLET ORAL at 08:04

## 2024-04-22 RX ADMIN — LIDOCAINE 5% 1 PATCH: 700 PATCH TOPICAL at 09:04

## 2024-04-22 RX ADMIN — SENNOSIDES AND DOCUSATE SODIUM 2 TABLET: 8.6; 5 TABLET ORAL at 08:04

## 2024-04-22 RX ADMIN — ACETAMINOPHEN AND CODEINE PHOSPHATE 1 TABLET: 300; 30 TABLET ORAL at 03:04

## 2024-04-22 RX ADMIN — ACETAMINOPHEN AND CODEINE PHOSPHATE 1 TABLET: 300; 30 TABLET ORAL at 08:04

## 2024-04-22 NOTE — PROGRESS NOTES
"[facility]  [facility] MEDICINE ~ PROGRESS NOTE    CHIEF COMPLAINT     Hospital follow up    HOSPITAL COURSE     Patient is 79 years old male with a past medical history of rheumatoid arthritis, osteoarthritis, osteopenia, hx of partial colon resection ("black spot on colon" - reportedly non cancerous), and newly diagnosed HTN who initially presented to [facility] s/p fall at home resulting in a right intertrochanteric femur fracture. Patient subsequently underwent IM nailing of right intertrochanteric femur fracture on 04/11/2024 with Dr. Tatum.  Patient was noted to have elevated blood pressure readings during admission therefore was initiated on lisinopril, currently receiving 40 mg daily. Patient was transferred to our swing unit for continued therapy.  On exam today, patient denies any complaints.  He does endorse an episode of choking while eating breakfast this morning but contributes this to the dry food.  Will obtain SLP eval prior to initiating diet.  At baseline, patient does use an assistive device to walk, mainly Rollator.  He also has a powered wheelchair, a walker, a ramp at home, and lives with his wife who does assist with ADLs as needed.     Today:  Of urinating a lot this morning.  Denies any associated dysuria, urgency, hesitancy, changes in urine color or smell.  Patient denies any significant cough and states it is nonproductive.  Pain is well tolerated at this time.    OBJECTIVE/PHYSICAL EXAM     VITAL SIGNS (MOST RECENT):  Temp: 98.8 °F (37.1 °C) (04/22/24 0816)  Pulse: 97 (04/22/24 0816)  Resp: 18 (04/22/24 0816)  BP: 126/66 (04/22/24 0816)  SpO2: 95 % (04/22/24 0816) VITAL SIGNS (24 HOUR RANGE):  Temp:  [97.7 °F (36.5 °C)-98.8 °F (37.1 °C)] 98.8 °F (37.1 °C)  Pulse:  [83-97] 97  Resp:  [18] 18  SpO2:  [95 %-99 %] 95 %  BP: (126-160)/(66-83) 126/66     GENERAL: In no acute distress, afebrile  HEENT:  Atraumatic, normocephalic, moist mucous membranes  CHEST: Clear to " "auscultation bilaterally  HEART: S1, S2, no appreciable murmur  ABDOMEN: Soft, nontender, BS +  MSK: Warm, no lower extremity edema, RA changes throughout   NEUROLOGIC: Alert and oriented x4, moving all extremities   INTEGUMENTARY: Right hip incision without erythema or drainage   PSYCHIATRY: Appropriate mood and affect     ASSESSMENT/PLAN     S/p fall at home resulting in a right intertrochanteric femur fracture  Underwent IM nailing of right intertrochanteric femur fracture on 04/11/2024 with Dr. Tatum  WBAT to RLE, full ROM   Lovenox for DVT prevention, and orthopedics recommends aspirin 81 mg b.i.d. upon discharge, but patient is allergic.  PT/OT  Follow-up with Dr. Tatum on 04/30/2024 at 0800  Pain control as needed - starting Tylenol No. 3 today, adding lidocaine patch    Dysphagia  MBS on 04/18/2024 - SLP recommended soft and bite size diet with gravy and mildly thick liquids  Planning for esophagram next week      Post-op anemia   Stable     HTN   Reports newly diagnosed   Currently receiving Lisinopril 40mg QD     Rheumatoid arthritis, osteoarthritis  Continue home methylprednisone 4 mg daily - increased to 8mg QD x3 days   Lidocaine patch  PPI during admission     DVT prophylaxis: Lovenox     Anticipated discharge and disposition: Continue PT/OT  __________________________________________________________________________    LABS/MICRO/MEDS/DIAGNOSTICS     LABS  No results for input(s): "NA", "K", "CHLORIDE", "CO2", "BUN", "CREATININE", "GLUCOSE", "CALCIUM", "ALKPHOS", "AST", "ALT", "ALBUMIN" in the last 72 hours.    No results for input(s): "WBC", "RBC", "HCT", "MCV", "PLT" in the last 72 hours.    Invalid input(s): "HG"    MICROBIOLOGY  Microbiology Results (last 7 days)       Procedure Component Value Units Date/Time    Respiratory Culture [[id number]]     Order Status: Sent Specimen: Sputum, Induced              MEDICATIONS  Current Facility-Administered Medications   Medication Dose Route Frequency    " "calcium-vitamin D3  1 tablet Oral BID    enoxparin  40 mg Subcutaneous Daily    famotidine  20 mg Oral BID    LIDOcaine  1 patch Transdermal Q24H    LIDOcaine  1 patch Transdermal Q24H    lisinopriL  40 mg Oral Daily    methocarbamoL  750 mg Oral TID    senna-docusate 8.6-50 mg  2 tablet Oral BID         INFUSIONS  Current Facility-Administered Medications   Medication Dose Route Frequency Last Rate Last Admin        DIAGNOSTIC TESTS  Fl Modified Barium Swallow Speech   Final Result           No results found for: "EF"     NUTRITION STATUS  Patient meets ASPEN criteria for moderate malnutrition of acute illness or injury per RD assessment as evidenced by:  Energy Intake (Malnutrition): less than 75% for greater than 7 days  Weight Loss (Malnutrition): other (see comments) (-5.97% in recent months. Reported 10# wt loss pt stated)  Subcutaneous Fat (Malnutrition): moderate depletion  Muscle Mass (Malnutrition): moderate depletion           A minimum of two characteristics is recommended for diagnosis of either severe or non-severe malnutrition.     Case related differential diagnoses have been reviewed; assessment and plan has been documented. I have personally reviewed the labs and test results that are currently available; I have reviewed the patients medication list. I have reviewed the consulting providers recommendations. I have reviewed or attempted to review medical records based upon their availability.  All of the patient's and/or family's questions have been addressed and answered to the best of my ability.  I will continue to monitor closely and make adjustments to medical management as needed.  This document was created using M*Modal Fluency Direct.  Transcription errors may have been made.  Please contact me if any questions may rise regarding documentation to clarify transcription.      JOEY Brooks   Internal Medicine  Department of Hospital Medicine [facility]  "

## 2024-04-22 NOTE — PT/OT/SLP PROGRESS
Speech Language Pathology Treatment    Patient Name:  Terence Tanner Jr.   MRN:  [id number]  Admitting Diagnosis: Closed fracture of right hip    Recommendations:                 General Recommendations:  Dysphagia therapy  Diet recommendations:   ,     Aspiration Precautions: 1 bite/sip at a time, Alternating bites/sips, Chin tuck, HOB to 90 degrees, and Meds crushed in puree   General Precautions: Standard,    Communication strategies:  go to room if call light pushed    Assessment:     Terence Tanner Jr. is a 79 y.o. male with an SLP diagnosis of Dysphagia.  He presents with moderate oropharyngeal dysphagia.    Subjective     Pt upright in chair upon SLP arrival.  Pt pleasant and cooperative.    Patient goals: Get leg better, stronger,  and return home     Pain/Comfort:       Respiratory Status: Room air    Objective:     Has the patient been evaluated by SLP for swallowing?   Yes   Keep patient NPO?     Current Respiratory Status:         Reviewed the recommended diet modification and the reasons with patient, wife, son, and daughter-in-law.  All verbalized understanding and agreement.  Tolerated soft and bite sized diet with gravy pt cleared throat x 1 while eating to clear materials, reviewed importance of sitting upright at 90 degrees while eating/drinking and 30 mins post. Progressing towards short term goals.          Goals:   Multidisciplinary Problems       SLP Goals          Problem: SLP    Goal Priority Disciplines Outcome   SLP Goal     SLP    Description: Goals:   Long Term Goal:  1.  Tolerate least restrictive diet and liquids without s/s of aspiration  Short Term Goals:    1. Tolerate soft and bite sized diet with gravy (IDDSI 6) with mildly thick liquids  (IDDSI 2)  2. Complete tongue base and pharyngeal exercises 10x/5 sets  3. Verbalize and demonstrate swallowing strategies of chin tuck and alternating solids/liquids  4. Recommendation of possible esophagram-GI consult.                         Plan:      Patient to be seen:  3-5x/week    Plan of Care expires:  5/18/24   Plan of Care reviewed with:   Patient and treatment team   SLP Follow-Up:  yes        Discharge recommendations:      Barriers to Discharge:  Level of Skilled Assistance Needed   and Safety Awareness      Time Tracking:     SLP Treatment Date: 4/22/24      Speech Start Time:   11:45  Speech Stop Time:    12:10    Speech Total Time (min):  25     Billable Minutes: Treatment Swallowing Dysfunction 25 04/22/2024

## 2024-04-22 NOTE — PT/OT/SLP PROGRESS
Occupational Therapy  Treatment    Name: Terence Tanner Jr.    : 1945 (79 y.o.)  MRN: [id number]           TREATMENT SUMMARY AND RECOMMENDATIONS:      OT Date of Treatment: 24  OT Start Time: 1330  OT Stop Time: 1351  OT Total Time (min): 21 min      Subjective Assessment:   No complaints  Lethargic    Awake, alert, cooperative  Impulsive    Uncooperative   Flat affect    Agitated X c/o pain   X Appropriate  c/o fatigue    Confused  Treated at bedside     Emotionally labile X Treated in gym/dept.      Other:        Therapy Precautions:    Cognitive deficits  Spinal precautions    Collar - hard  Sternal precautions    Collar - soft   TLSO   X Fall risk  LSO    Hip precautions - posterior  Knee immobilizer    Hip precautions - anterior X WBAT    Impaired communication  Partial weightbearing    Oxygen  TTWB    PEG tube  NWB    Visual deficits      Hearing deficits  X Other: ROMAT       Treatment Objectives:     Mobility Training:    Mobility task Assist level Comments    Bed mobility SBA EOB>supine   Transfer CGA Stand step t/f c RW w/c>EOB   Sit to stands transitions CGA  From chair level c BUE support   Functional mobility     Sitting balance     Standing balance      Other:          Therapeutic Exercise:   Exercise Sets Reps Comments   UBE 2 5' F/B Mod resistance                         Additional Comments:  Pt requesting to terminate session 2/2 pain.     Assessment: Patient tolerated session fair. Pt c increased pain this PM limiting session. Pt would continue to benefit from skilled OT services to improve pt's safety and independence with daily occupations, decrease caregiver burden, and reduce pt's risk of falls.    GOALS:   Multidisciplinary Problems       Occupational Therapy Goals          Problem: Occupational Therapy    Goal Priority Disciplines Outcome Interventions   Occupational Therapy Goal     OT, PT/OT Ongoing, Progressing    Description: Goals to be met by: 2024     Patient will  increase functional independence with ADLs by performing:    LE Dressing with Stand-by Assistance.  Grooming while standing at sink with Stand-by Assistance.  Toileting from toilet with Stand-by Assistance for hygiene and clothing management.   Toilet transfer to toilet with Stand-by Assistance.                         Recommendations:     Discharge Equipment Recommendations:  none     Plan:     Patient to be seen  (5-6x/week (QD-BID)) to address the above listed problems via self-care/home management, neuromuscular re-education, therapeutic activities, therapeutic exercises  Plan of Care Expires: 05/17/24  Plan of Care Reviewed with: patient, spouse  Revisions made to plan of care: No      Skilled OT Minutes Provided: 21  Communication with Treatment Team:     Equipment recommendations:       At end of treatment, patient remained:   Up in chair     Up in wheelchair in room   X In bed   X With alarm activated   X Bed rails up   X Call bell in reach    X Family/friends present    Restraints secured properly    In bathroom with CNA/RN notified    In gym with PT/PTA/tech    Nurse aware    Other:

## 2024-04-22 NOTE — PT/OT/SLP PROGRESS
Physical Therapy Treatment Note           Name: Terence Tanner Jr.    : 1945 (79 y.o.)  MRN: [id number]           TREATMENT SUMMARY AND RECOMMENDATIONS:    PT Received On: 24  PT Start Time: 1258     PT Stop Time: 1325  PT Total Time (min): 27 min     Subjective Assessment:   No complaints  Lethargic   x Awake, alert, cooperative  Uncooperative   x Agitated x c/o pain    Appropriate x c/o fatigue    Confused  Treated at bedside     Emotionally labile x Treated in gym/dept.    Impulsive  Other:    Flat affect       Therapy Precautions:    Cognitive deficits  Spinal precautions    Collar - hard  Sternal precautions    Collar - soft   TLSO   x Fall risk  LSO    Hip precautions - posterior  Knee immobilizer    Hip precautions - anterior x WBAT    Impaired communication  Partial weightbearing    Oxygen  TTWB    PEG tube  NWB    Visual deficits  Other:currently NPO-awaiting ST assessment     Hearing deficits          Treatment Objectives:     Mobility Training:   Assist level Comments    Bed mobility     Transfer CGA Stand pivot toilet<>WC   Gait CGA  X 80 feet using RW, following with WC   Sit to stand transitions CGA/SBA Multiple times throughout session    Sitting balance     Standing balance  SBA For pulling up pants post bathroom    Wheelchair mobility     Car transfer     Other:          Therapeutic Exercise:   Exercise Sets Reps Comments               Seated B LE EX- AAROM R LE, AROM LLE  20 Hip flexion, knee ext, ankle PF/DF, hip add/abd isometrics              Additional Comments:  Pt assisted to the bathroom then gym. He is still very  impacted by pain, PT progressing as able.     Assessment: Patient tolerated session Fair; unable to have BM - pt becoming frustrated.     PT Plan: continue per POC  Revisions made to plan of care: No    GOALS:   Multidisciplinary Problems       Physical Therapy Goals          Problem: Physical Therapy    Goal Priority Disciplines Outcome Goal Variances  Interventions   Physical Therapy Goal     PT, PT/OT Ongoing, Progressing     Description: Goals to be met by: Discharge      Patient will increase functional independence with mobility by performin. Sit to stand transfer with Modified [place]  2. Bed to chair transfer with Modified [place] using Rolling Walker  3. Gait  x at least 50 feet with Modified [place] and up to 175 feet with SBA using Rolling Walker.   4. Increased functional strength to 4/5 for R LE  5. Supine<>Sit with MOD I.                         Skilled PT Minutes Provided: 25   Communication with Treatment Team:     Equipment recommendations:       At end of treatment, patient remained:  x Up in chair     Up in wheelchair in room    In bed    With alarm activated    Bed rails up    Call bell in reach     Family/friends present    Restraints secured properly    In bathroom with CNA/RN notified    Nurse aware   x In gym with therapist/tech    Other:

## 2024-04-22 NOTE — PLAN OF CARE
Problem: Adult Inpatient Plan of Care  Goal: Plan of Care Review  Outcome: Ongoing, Progressing  Flowsheets (Taken 4/22/2024 0741)  Plan of Care Reviewed With:   patient   spouse  Goal: Patient-Specific Goal (Individualized)  Outcome: Ongoing, Progressing  Flowsheets (Taken 4/22/2024 0741)  Anxieties, Fears or Concerns: Right leg pain  Individualized Care Needs: pain management, NPO pending MBS results, safety with transfers to [facility], meds crushed in applesauce  Goal: Absence of Hospital-Acquired Illness or Injury  Outcome: Ongoing, Progressing  Intervention: Identify and Manage Fall Risk  Flowsheets (Taken 4/22/2024 0741)  Safety Promotion/Fall Prevention:   assistive device/personal item within reach   bed alarm set   medications reviewed   nonskid shoes/socks when out of bed   side rails raised x 3   instructed to call staff for mobility  Intervention: Prevent Skin Injury  Flowsheets (Taken 4/22/2024 0741)  Body Position:   position changed independently   log-rolled   weight shifting   sitting up in bed   supine  Skin Protection:   adhesive use limited   incontinence pads utilized   transparent dressing maintained   tubing/devices free from skin contact  Intervention: Prevent and Manage VTE (Venous Thromboembolism) Risk  Flowsheets (Taken 4/22/2024 0741)  Activity Management: Rolling - L1  VTE Prevention/Management:   bleeding precations maintained   bleeding risk assessed  Intervention: Prevent Infection  Flowsheets (Taken 4/22/2024 0741)  Infection Prevention:   cohorting utilized   environmental surveillance performed   equipment surfaces disinfected   hand hygiene promoted   single patient room provided   rest/sleep promoted  Goal: Optimal Comfort and Wellbeing  Outcome: Ongoing, Progressing  Intervention: Monitor Pain and Promote Comfort  Flowsheets (Taken 4/22/2024 0741)  Pain Management Interventions:   care clustered   pillow support provided   position adjusted   medication offered  Intervention:  Provide Person-Centered Care  Flowsheets (Taken 4/22/2024 0741)  Trust Relationship/Rapport:   care explained   questions answered   questions encouraged  Goal: Readiness for Transition of Care  Outcome: Ongoing, Progressing     Problem: Infection  Goal: Absence of Infection Signs and Symptoms  Outcome: Ongoing, Progressing  Intervention: Prevent or Manage Infection  Flowsheets (Taken 4/22/2024 0741)  Infection Management: aseptic technique maintained     Problem: Fall Injury Risk  Goal: Absence of Fall and Fall-Related Injury  Outcome: Ongoing, Progressing  Intervention: Identify and Manage Contributors  Flowsheets (Taken 4/22/2024 0741)  Self-Care Promotion:   independence encouraged   BADL personal objects within reach   meal set-up provided   safe use of adaptive equipment encouraged  Medication Review/Management: medications reviewed  Intervention: Promote Injury-Free Environment  Flowsheets (Taken 4/22/2024 0741)  Safety Promotion/Fall Prevention:   assistive device/personal item within reach   bed alarm set   medications reviewed   nonskid shoes/socks when out of bed   side rails raised x 3   instructed to call staff for mobility     Problem: Swallowing Impairment  Goal: Optimal Eating and Swallowing Without Aspiration  Outcome: Ongoing, Progressing  Intervention: Optimize Eating and Swallowing  Flowsheets (Taken 4/22/2024 0741)  Aspiration Precautions: awake/alert before oral intake     Problem: Hypertension Comorbidity  Goal: Blood Pressure in Desired Range  Outcome: Ongoing, Progressing  Intervention: Maintain Blood Pressure Management  Flowsheets (Taken 4/22/2024 0741)  Medication Review/Management: medications reviewed     Problem: Osteoarthritis Comorbidity  Goal: Maintenance of Osteoarthritis Symptom Control  Outcome: Ongoing, Progressing  Intervention: Maintain Osteoarthritis Symptom Control  Flowsheets (Taken 4/22/2024 0741)  Activity Management: Rolling - L1  Medication Review/Management:  medications reviewed     Problem: Pain Chronic (Persistent) (Comorbidity Management)  Goal: Acceptable Pain Control and Functional Ability  Outcome: Ongoing, Progressing  Intervention: Develop Pain Management Plan  Flowsheets (Taken 4/22/2024 0741)  Pain Management Interventions:   care clustered   pillow support provided   position adjusted   medication offered  Intervention: Manage Persistent Pain  Flowsheets (Taken 4/22/2024 0741)  Bowel Elimination Promotion:   adequate fluid intake promoted   ambulation promoted  Medication Review/Management: medications reviewed

## 2024-04-22 NOTE — PROGRESS NOTES
"Inpatient Nutrition Assessment    Admit Date: 4/17/2024   Total duration of encounter: 5 days   Patient Age: 79 y.o.    Nutrition Recommendation/Prescription     Continue easy to chew mildly thick liquid: soft and bite sized gravy on meats. 2.Continue boost original 1 bottle BID provides 240 kcal and 10 gm of protein, per serving. 3. Monitor intake, wt, labs, medications    Communication of Recommendations: reviewed with provider and reviewed with patient    Nutrition Assessment     Malnutrition Assessment/Nutrition-Focused Physical Exam    Malnutrition Context: acute illness or injury (04/18/24 1455)  Malnutrition Level: moderate (04/18/24 1455)  Energy Intake (Malnutrition): less than 75% for greater than 7 days (04/18/24 1455)  Weight Loss (Malnutrition): other (see comments) (-5.97% in recent months. Reported 10# wt loss pt stated) (04/18/24 1455)  Subcutaneous Fat (Malnutrition): moderate depletion (04/18/24 1455)  Orbital Region (Subcutaneous Fat Loss): moderate depletion  Upper Arm Region (Subcutaneous Fat Loss): moderate depletion     Muscle Mass (Malnutrition): moderate depletion (04/18/24 1455)     Clavicle Bone Region (Muscle Loss): moderate depletion        Dorsal Hand (Muscle Loss): moderate depletion                    A minimum of two characteristics is recommended for diagnosis of either severe or non-severe malnutrition.    Chart Review    Reason Seen: continuous nutrition monitoring, length of stay, and follow-up    Malnutrition Screening Tool Results   Have you recently lost weight without trying?: Yes: 2-13 lbs  Have you been eating poorly because of a decreased appetite?: No   MST Score: 1   Diagnosis:  S/p fall at home resulting in a right intertrochanteric femur fracture   Post-op anemia   HTN  Dysphagia  Rheumatoid arthritis, osteoarthritis  Relevant Medical History: rheumatoid arthritis, osteoarthritis, osteopenia, hx of partial colon resection ("black spot on colon" - non cancerous "     Scheduled Medications:  calcium-vitamin D3, 1 tablet, BID  enoxparin, 40 mg, Daily  famotidine, 20 mg, BID  LIDOcaine, 1 patch, Q24H  LIDOcaine, 1 patch, Q24H  lisinopriL, 40 mg, Daily  methocarbamoL, 750 mg, TID  senna-docusate 8.6-50 mg, 2 tablet, BID    Continuous Infusions:   PRN Medications:   Current Facility-Administered Medications:     acetaminophen, 650 mg, Oral, Q4H PRN    acetaminophen-codeine 300-30mg, 1 tablet, Oral, Q4H PRN    albuterol-ipratropium, 3 mL, Nebulization, Q6H PRN    aluminum-magnesium hydroxide-simethicone, 30 mL, Oral, QID PRN    barium sulfate, 140 mL, Oral, ONCE PRN    benzonatate, 100 mg, Oral, TID PRN    bisacodyL, 10 mg, Rectal, Daily PRN    calcium carbonate, 500 mg, Oral, TID PRN    ez paste cream ba sulfate, 10 g, Oral, ONCE PRN    guaiFENesin 100 mg/5 ml, 200 mg, Oral, Q4H PRN    hydrALAZINE, 10 mg, Intravenous, Q4H PRN    ipratropium, 0.5 mg, Nebulization, QID PRN    labetalol, 10 mg, Intravenous, QID PRN    melatonin, 9 mg, Oral, Nightly PRN    morphine, 2 mg, Intravenous, Q6H PRN    naloxone, 0.02 mg, Intravenous, PRN    ondansetron, 8 mg, Oral, Q8H PRN    ondansetron, 4 mg, Intravenous, Q8H PRN    polyethylene glycol, 17 g, Oral, TID PRN    simethicone, 1 tablet, Oral, QID PRN    sodium chloride 0.9%, 10 mL, Intravenous, PRN    Calorie Containing IV Medications: no significant kcals from medications at this time    Recent Labs   Lab 04/18/24  0420      K 3.8   CALCIUM 8.9   CHLORIDE 103   CO2 28   BUN 14.6   CREATININE 0.74   EGFRNORACEVR >60   GLUCOSE 94   WBC 8.76   HGB 10.6*   HCT 32.0*     Nutrition Orders:  Diet Easy to Chew (IDDSI Level 7) Mildly Thick Liquids (IDDSI Level 2)  Dietary nutrition supplements Boost Original Nutritional Drink - Chocolate; BID    Appetite/Oral Intake: good/100% of meals  Factors Affecting Nutritional Intake: chewing difficulty, decreased appetite, and difficulty/impaired swallowing  Social Needs Impacting Access to Food: none  "identified  Food/Yarsanism/Cultural Preferences: none reported  Food Allergies: none reported  Last Bowel Movement: 24  Wound(s):      Comments  Per MD notes. Patient complained of difficulty swallowing upon admission, he was evaluated by speech therapy and needs to remain NPO until a barium swallow is performed.  Does affirm that he has been compensating for difficulty swallowing at home for many years and has self modified his diet to easy to chew foods.     24: Pt intake improved. Pt stated enjoying the eggs this am. LBM noted. Will monitor. Pt doesn't like thickened liquids. Encourage compliance with thickened liquids. Family present.     24: SLP recommended easy to chew, soft and bite sized gravy on meats mildly thick liquids. Delivered lunch tray to patient. Family present during rounds. Pt and family reported decrease intake over week. Pt has good appetite at home with access to meals. Pt follow easy to chew diet. Discussed diet change with family and patient. Pt on thickened liquids. Provide education on how to thickened to mildly thick liquids consistency with family. Discussed food preferences. Pt also report weight loss of 10#. Pt drinks boost at home. Provide recs to MD for addition nutrition since, decrease intake x one week.      24: Pt c/o of difficulty swallowing. Pt NPO until barium swallow. Per MD pt follow easy to chew foods at home. Will follow up with SLP on diet modifications. Will complete NFPE on next follow up.     Anthropometrics    Height: 5' 9" (175.3 cm),    Last Weight: 62.9 kg (138 lb 10.7 oz) (24 1416), Weight Method: Bed Scale  BMI (Calculated): 20.5  BMI Classification: normal (BMI 18.5-24.9)        Ideal Body Weight (IBW), Male: 160 lb     % Ideal Body Weight, Male (lb): 86.67 %                 Usual Body Weight (UBW), k.4 kg  % Usual Body Weight: 94.23  % Weight Change From Usual Weight: -5.97 %  Usual Weight Provided By: patient    Wt Readings " from Last 5 Encounters:   04/22/24 62.9 kg (138 lb 10.7 oz)   04/12/24 70.3 kg (155 lb)     Weight Change(s) Since Admission: -3.3 kg wt loss. Will  monitor intake  Wt Readings from Last 1 Encounters:   04/22/24 1416 62.9 kg (138 lb 10.7 oz)   04/22/24 0508 62.9 kg (138 lb 10.7 oz)   04/18/24 1452 66.2 kg (145 lb 15.1 oz)   04/17/24 1541 66.2 kg (145 lb 15.1 oz)   04/17/24 1100 66.2 kg (145 lb 15.1 oz)   Admit Weight: 66.2 kg (145 lb 15.1 oz) (04/17/24 1100), Weight Method: Bed Scale    Estimated Needs    Weight Used For Calorie Calculations: 62.9 kg (138 lb 10.7 oz)  Energy Calorie Requirements (kcal): 1,887 kcal (30 kcal/kg/CBW)  Energy Need Method: [place]-St Jeor  Weight Used For Protein Calculations: 62.9 kg (138 lb 10.7 oz)  Protein Requirements: 75.64 gm (1.2 gm/kg/CBW)  Fluid Requirements (mL): 1,887 mL (1 mL/kcal)        Enteral Nutrition     Patient not receiving enteral nutrition at this time.    Parenteral Nutrition     Patient not receiving parenteral nutrition support at this time.    Evaluation of Received Nutrient Intake    Calories: meeting estimated needs  Protein: meeting estimated needs    Patient Education     Not applicable.    Nutrition Diagnosis     PES: Swallowing difficulty related to oropharyngeal dysphagia as evidence bydecreased mastication, a-p transfer, delayed swallow reflex, pharyngeal residue after the swallow. -valleculae, pyriform sinus, top of airway.  Laryngeal penetration with thin liquids   . (progressing)     PES: Moderate acute disease or injury related malnutrition related to acute illness as evidenced by less than 75% needs met for greater than 7 days, moderate fat depletion, and moderate muscle depletion. (progressing)    Nutrition Interventions     Intervention(s): general/healthful diet, modified composition of meals/snacks, commercial beverage, and collaboration with other providers    Goal: Consume % of meals/snacks by follow-up. (progressing)  Goal: Consume  % of meals/snacks by follow-up. (progressing)    Nutrition Goals & Monitoring     Dietitian will monitor: food and beverage intake, weight, weight change, electrolyte/renal panel, glucose/endocrine profile, and gastrointestinal profile  Discharge planning:  easy to chew soft bite sized meats with extra gravy mildly thick liquids with boost supplements diet with texture modifications per SLP  Nutrition Risk/Follow-Up: moderate (follow-up in 3-5 days)   Please consult if re-assessment needed sooner.

## 2024-04-22 NOTE — PT/OT/SLP PROGRESS
Occupational Therapy  Treatment    Name: Terence Tanner Jr.    : 1945 (79 y.o.)  MRN: [id number]           TREATMENT SUMMARY AND RECOMMENDATIONS:      OT Date of Treatment: 24  OT Start Time: 900  OT Stop Time: 925  OT Total Time (min): 25 min      Subjective Assessment:   No complaints  Lethargic   X Awake, alert, cooperative  Impulsive    Uncooperative   Flat affect    Agitated X c/o pain (R hip)    Appropriate  c/o fatigue    Confused  Treated at bedside     Emotionally labile X Treated in gym/dept.      Other:        Therapy Precautions:    Cognitive deficits  Spinal precautions    Collar - hard  Sternal precautions    Collar - soft   TLSO   X Fall risk  LSO    Hip precautions - posterior  Knee immobilizer    Hip precautions - anterior X WBAT    Impaired communication  Partial weightbearing    Oxygen  TTWB    PEG tube  NWB    Visual deficits      Hearing deficits  X Other: ROMAT       Treatment Objectives:     Mobility Training:    Mobility task Assist level Comments    Bed mobility     Transfer     Sit to stands transitions CGA From chair level c BUE support x4   Functional mobility CGA Pt ambulated ~20ft c RW.    Sitting balance     Standing balance      Other:          Therapeutic Exercise:   Exercise Sets Reps Comments   UB strengthening 2 10 With 2# dowel, pt perf Shoulder press, chest press, straight arm raises, bicep curls, forward rows, backwards rows. Min v/cs for proper technique and positioning. Min length r/bs between sets.   Triceps strengthening 2 10 With yellow theraband, pt perf triceps ext                   Assessment: Patient tolerated session well. Pt continues to report significant pain in R hip and knee; however able to participate in tx session. Pt would continue to benefit from skilled OT services to improve pt's safety and independence with daily occupations, decrease caregiver burden, and reduce pt's risk of falls.    GOALS:   Multidisciplinary Problems       Occupational  Therapy Goals          Problem: Occupational Therapy    Goal Priority Disciplines Outcome Interventions   Occupational Therapy Goal     OT, PT/OT Ongoing, Progressing    Description: Goals to be met by: 5/17/2024     Patient will increase functional independence with ADLs by performing:    LE Dressing with Stand-by Assistance.  Grooming while standing at sink with Stand-by Assistance.  Toileting from toilet with Stand-by Assistance for hygiene and clothing management.   Toilet transfer to toilet with Stand-by Assistance.                         Recommendations:     Discharge Equipment Recommendations:  none     Plan:     Patient to be seen  (5-6x/week (QD-BID)) to address the above listed problems via self-care/home management, neuromuscular re-education, therapeutic activities, therapeutic exercises  Plan of Care Expires: 05/17/24  Plan of Care Reviewed with: patient, spouse  Revisions made to plan of care: No      Skilled OT Minutes Provided: 25  Communication with Treatment Team:     Equipment recommendations:       At end of treatment, patient remained:   Up in chair     Up in wheelchair in room    In bed    With alarm activated    Bed rails up    Call bell in reach     Family/friends present    Restraints secured properly    In bathroom with CNA/RN notified   X In gym with PT/PTA/tech    Nurse aware    Other:

## 2024-04-22 NOTE — PT/OT/SLP PROGRESS
Physical Therapy Treatment Note           Name: Terence Tanner Jr.    : 1945 (79 y.o.)  MRN: [id number]           TREATMENT SUMMARY AND RECOMMENDATIONS:    PT Received On: 24  PT Start Time: 929     PT Stop Time: 957  PT Total Time (min): 28 min     Subjective Assessment:   No complaints  Lethargic   x Awake, alert, cooperative  Uncooperative   x Agitated x c/o pain    Appropriate x c/o fatigue    Confused  Treated at bedside     Emotionally labile x Treated in gym/dept.    Impulsive  Other:    Flat affect       Therapy Precautions:    Cognitive deficits  Spinal precautions    Collar - hard  Sternal precautions    Collar - soft   TLSO   x Fall risk  LSO    Hip precautions - posterior  Knee immobilizer    Hip precautions - anterior x WBAT    Impaired communication  Partial weightbearing    Oxygen  TTWB    PEG tube  NWB    Visual deficits x Other: R knee brace    Hearing deficits          Treatment Objectives:     Mobility Training:   Assist level Comments    Bed mobility     Transfer CGA Stand pivot  toilet<>WC and WC>recliner   Gait CGA X 90 feet and x 15 using RW   Sit to stand transitions CGA/SBA From WC, Toilet and Recliner   Sitting balance     Standing balance      Wheelchair mobility     Car transfer     Other:          Therapeutic Exercise:   Exercise Sets Reps Comments   Seated B LE Bike using UBE  5' 1/2 circles completed due to pain   B LE EX(AAROM R LE, AROM  L LE)  20 Hip flexion, knee ext and ankle PF/DF   Standing R LE marching and hip abd  10 Using RW             Additional Comments:  Pt without any changes, still impacted by pain. PT progressing as able.      Assessment: Patient tolerated session Fair. Pt complaints but does not refuse.     PT Plan: continue per POC  Revisions made to plan of care: No    GOALS:   Multidisciplinary Problems       Physical Therapy Goals          Problem: Physical Therapy    Goal Priority Disciplines Outcome Goal Variances Interventions   Physical  Therapy Goal     PT, PT/OT Ongoing, Progressing     Description: Goals to be met by: Discharge      Patient will increase functional independence with mobility by performin. Sit to stand transfer with Modified [place]  2. Bed to chair transfer with Modified [place] using Rolling Walker  3. Gait  x at least 50 feet with Modified [place] and up to 175 feet with SBA using Rolling Walker.   4. Increased functional strength to 4/5 for R LE  5. Supine<>Sit with MOD I.                         Skilled PT Minutes Provided: 25   Communication with Treatment Team:     Equipment recommendations:       At end of treatment, patient remained:  x Up in chair     Up in wheelchair in room    In bed   x With alarm activated    Bed rails up   x Call bell in reach     Family/friends present    Restraints secured properly    In bathroom with CNA/RN notified   x Nurse aware    In gym with therapist/tech    Other:

## 2024-04-23 LAB
ANION GAP SERPL CALC-SCNC: 5 MEQ/L
BASOPHILS # BLD AUTO: 0.04 X10(3)/MCL
BASOPHILS NFR BLD AUTO: 0.5 %
BUN SERPL-MCNC: 20.2 MG/DL (ref 8.4–25.7)
CALCIUM SERPL-MCNC: 9.1 MG/DL (ref 8.8–10)
CHLORIDE SERPL-SCNC: 102 MMOL/L (ref 98–107)
CO2 SERPL-SCNC: 30 MMOL/L (ref 23–31)
CREAT SERPL-MCNC: 0.82 MG/DL (ref 0.73–1.18)
CREAT/UREA NIT SERPL: 25
EOSINOPHIL # BLD AUTO: 0.2 X10(3)/MCL (ref 0–0.9)
EOSINOPHIL NFR BLD AUTO: 2.6 %
ERYTHROCYTE [DISTWIDTH] IN BLOOD BY AUTOMATED COUNT: 14 % (ref 11.5–17)
GFR SERPLBLD CREATININE-BSD FMLA CKD-EPI: >60 MLS/MIN/1.73/M2
GLUCOSE SERPL-MCNC: 96 MG/DL (ref 82–115)
HCT VFR BLD AUTO: 32.5 % (ref 42–52)
HGB BLD-MCNC: 10.6 G/DL (ref 14–18)
IMM GRANULOCYTES # BLD AUTO: 0.1 X10(3)/MCL (ref 0–0.04)
IMM GRANULOCYTES NFR BLD AUTO: 1.3 %
LYMPHOCYTES # BLD AUTO: 2.14 X10(3)/MCL (ref 0.6–4.6)
LYMPHOCYTES NFR BLD AUTO: 27.6 %
MCH RBC QN AUTO: 30.6 PG (ref 27–31)
MCHC RBC AUTO-ENTMCNC: 32.6 G/DL (ref 33–36)
MCV RBC AUTO: 93.9 FL (ref 80–94)
MONOCYTES # BLD AUTO: 0.76 X10(3)/MCL (ref 0.1–1.3)
MONOCYTES NFR BLD AUTO: 9.8 %
NEUTROPHILS # BLD AUTO: 4.5 X10(3)/MCL (ref 2.1–9.2)
NEUTROPHILS NFR BLD AUTO: 58.2 %
PLATELET # BLD AUTO: 272 X10(3)/MCL (ref 130–400)
PMV BLD AUTO: 9.8 FL (ref 7.4–10.4)
POTASSIUM SERPL-SCNC: 4.1 MMOL/L (ref 3.5–5.1)
RBC # BLD AUTO: 3.46 X10(6)/MCL (ref 4.7–6.1)
SODIUM SERPL-SCNC: 137 MMOL/L (ref 136–145)
WBC # SPEC AUTO: 7.74 X10(3)/MCL (ref 4.5–11.5)

## 2024-04-23 PROCEDURE — 99900035 HC TECH TIME PER 15 MIN (STAT)

## 2024-04-23 PROCEDURE — 25000003 PHARM REV CODE 250: Performed by: INTERNAL MEDICINE

## 2024-04-23 PROCEDURE — 80048 BASIC METABOLIC PNL TOTAL CA: CPT | Performed by: PHYSICIAN ASSISTANT

## 2024-04-23 PROCEDURE — 94760 N-INVAS EAR/PLS OXIMETRY 1: CPT

## 2024-04-23 PROCEDURE — 85025 COMPLETE CBC W/AUTO DIFF WBC: CPT | Performed by: PHYSICIAN ASSISTANT

## 2024-04-23 PROCEDURE — 97110 THERAPEUTIC EXERCISES: CPT

## 2024-04-23 PROCEDURE — 11000004 HC SNF PRIVATE

## 2024-04-23 PROCEDURE — 25000003 PHARM REV CODE 250: Performed by: STUDENT IN AN ORGANIZED HEALTH CARE EDUCATION/TRAINING PROGRAM

## 2024-04-23 PROCEDURE — 36415 COLL VENOUS BLD VENIPUNCTURE: CPT | Performed by: PHYSICIAN ASSISTANT

## 2024-04-23 PROCEDURE — 63600175 PHARM REV CODE 636 W HCPCS: Performed by: PHYSICIAN ASSISTANT

## 2024-04-23 PROCEDURE — 97530 THERAPEUTIC ACTIVITIES: CPT

## 2024-04-23 PROCEDURE — 97116 GAIT TRAINING THERAPY: CPT | Mod: CQ

## 2024-04-23 PROCEDURE — 25000003 PHARM REV CODE 250: Performed by: PHYSICIAN ASSISTANT

## 2024-04-23 PROCEDURE — 92526 ORAL FUNCTION THERAPY: CPT

## 2024-04-23 RX ORDER — OXYCODONE HYDROCHLORIDE 5 MG/1
5 TABLET ORAL EVERY 6 HOURS PRN
Status: DISCONTINUED | OUTPATIENT
Start: 2024-04-23 | End: 2024-04-24

## 2024-04-23 RX ADMIN — Medication 1 TABLET: at 08:04

## 2024-04-23 RX ADMIN — ENOXAPARIN SODIUM 40 MG: 40 INJECTION SUBCUTANEOUS at 04:04

## 2024-04-23 RX ADMIN — OXYCODONE HYDROCHLORIDE 5 MG: 5 TABLET ORAL at 12:04

## 2024-04-23 RX ADMIN — ACETAMINOPHEN AND CODEINE PHOSPHATE 1 TABLET: 300; 30 TABLET ORAL at 08:04

## 2024-04-23 RX ADMIN — FAMOTIDINE 20 MG: 20 TABLET, FILM COATED ORAL at 08:04

## 2024-04-23 RX ADMIN — SENNOSIDES AND DOCUSATE SODIUM 2 TABLET: 8.6; 5 TABLET ORAL at 08:04

## 2024-04-23 RX ADMIN — LIDOCAINE 5% 1 PATCH: 700 PATCH TOPICAL at 10:04

## 2024-04-23 RX ADMIN — METHOCARBAMOL 750 MG: 750 TABLET ORAL at 08:04

## 2024-04-23 RX ADMIN — ACETAMINOPHEN AND CODEINE PHOSPHATE 1 TABLET: 300; 30 TABLET ORAL at 02:04

## 2024-04-23 RX ADMIN — METHOCARBAMOL 750 MG: 750 TABLET ORAL at 02:04

## 2024-04-23 RX ADMIN — ACETAMINOPHEN AND CODEINE PHOSPHATE 1 TABLET: 300; 30 TABLET ORAL at 12:04

## 2024-04-23 RX ADMIN — LISINOPRIL 40 MG: 20 TABLET ORAL at 08:04

## 2024-04-23 NOTE — PLAN OF CARE
Problem: Adult Inpatient Plan of Care  Goal: Plan of Care Review  Outcome: Progressing  Flowsheets (Taken 4/23/2024 0342)  Plan of Care Reviewed With: patient  Goal: Patient-Specific Goal (Individualized)  Outcome: Progressing  Flowsheets (Taken 4/23/2024 0342)  Anxieties, Fears or Concerns: none at this time  Individualized Care Needs: Pain management, Supervision with PO intake, Meds crushed in Pureed, Safety with mobility, Wound care and monitor for s/s of infection  Goal: Absence of Hospital-Acquired Illness or Injury  Outcome: Progressing  Intervention: Prevent and Manage VTE (Venous Thromboembolism) Risk  Flowsheets (Taken 4/22/2024 2000)  VTE Prevention/Management:   ambulation promoted   bleeding precations maintained   bleeding risk assessed   dorsiflexion/plantar flexion performed   fluids promoted   ROM (active) performed  Goal: Optimal Comfort and Wellbeing  Outcome: Progressing     Problem: Swallowing Impairment  Goal: Optimal Eating and Swallowing Without Aspiration  Outcome: Progressing

## 2024-04-23 NOTE — PT/OT/SLP PROGRESS
Occupational Therapy  Treatment    Name: Terence Tanner Jr.    : 1945 (79 y.o.)  MRN: [id number]           TREATMENT SUMMARY AND RECOMMENDATIONS:      OT Date of Treatment: 24  OT Start Time: 1332  OT Stop Time: 1358  OT Total Time (min): 26 min      Subjective Assessment:   No complaints  Lethargic   X Awake, alert, cooperative  Impulsive    Uncooperative   Flat affect    Agitated X c/o pain    Appropriate  c/o fatigue    Confused  Treated at bedside     Emotionally labile X Treated in gym/dept.      Other:        Therapy Precautions:    Cognitive deficits  Spinal precautions    Collar - hard  Sternal precautions    Collar - soft   TLSO   X Fall risk  LSO    Hip precautions - posterior  Knee immobilizer    Hip precautions - anterior X WBAT    Impaired communication  Partial weightbearing    Oxygen  TTWB    PEG tube  NWB    Visual deficits      Hearing deficits  X Other: ROMAT       Treatment Objectives:     Mobility Training:    Mobility task Assist level Comments    Bed mobility SBA EOB>supine   Transfer CGA Stand step t/f c RW w/c>EOB   Sit to stands transitions     Functional mobility     Sitting balance GOOD Pt seated performed fxnl reaching towards ground level to grasp objects and toss at target placed anterior. Alt R/L.    Standing balance      Other:          Therapeutic Exercise:   Exercise Sets Reps Comments   UB strengthening 2 15 With 3# dowel, pt perf chest press, straight arm raises, bicep curls, forward rows, backwards rows. Min length r/bs PRN. Min v/cs for proper technique and positioning.                          Assessment: Patient tolerated session fairly well. Pt continues to report significant R hip pain despite NSG premedicating pt for tx. Pt would continue to benefit from skilled OT services to improve pt's safety and independence with daily occupations, decrease caregiver burden, and reduce pt's risk of falls.     GOALS:   Multidisciplinary Problems       Occupational Therapy  Goals          Problem: Occupational Therapy    Goal Priority Disciplines Outcome Interventions   Occupational Therapy Goal     OT, PT/OT Progressing    Description: Goals to be met by: 5/17/2024     Patient will increase functional independence with ADLs by performing:    LE Dressing with Stand-by Assistance.  Grooming while standing at sink with Stand-by Assistance.  Toileting from toilet with Stand-by Assistance for hygiene and clothing management.   Toilet transfer to toilet with Stand-by Assistance.                         Recommendations:     Discharge Equipment Recommendations:  none     Plan:     Patient to be seen  (5-6x/week (QD-BID)) to address the above listed problems via self-care/home management, neuromuscular re-education, therapeutic activities, therapeutic exercises  Plan of Care Expires: 05/17/24  Plan of Care Reviewed with: patient, spouse  Revisions made to plan of care: No      Skilled OT Minutes Provided: 26  Communication with Treatment Team:     Equipment recommendations:       At end of treatment, patient remained:   Up in chair     Up in wheelchair in room   X In bed   X With alarm activated   X Bed rails up   X Call bell in reach     Family/friends present    Restraints secured properly    In bathroom with CNA/RN notified    In gym with PT/PTA/tech    Nurse aware    Other:

## 2024-04-23 NOTE — PROGRESS NOTES
"[facility]  [facility] MEDICINE ~ PROGRESS NOTE    CHIEF COMPLAINT     Hospital follow up    HOSPITAL COURSE     Patient is 79 years old male with a past medical history of rheumatoid arthritis, osteoarthritis, osteopenia, hx of partial colon resection ("black spot on colon" - reportedly non cancerous), and newly diagnosed HTN who initially presented to [facility] s/p fall at home resulting in a right intertrochanteric femur fracture. Patient subsequently underwent IM nailing of right intertrochanteric femur fracture on 04/11/2024 with Dr. Tatum.  Patient was noted to have elevated blood pressure readings during admission therefore was initiated on lisinopril, currently receiving 40 mg daily. Patient was transferred to our swing unit for continued therapy.  On exam today, patient denies any complaints.  He does endorse an episode of choking while eating breakfast this morning but contributes this to the dry food.  Will obtain SLP eval prior to initiating diet.  At baseline, patient does use an assistive device to walk, mainly Rollator.  He also has a powered wheelchair, a walker, a ramp at home, and lives with his wife who does assist with ADLs as needed.     Today:  KUB obtained yesterday revealed stool in colon. Senna-Docusate to be resumed. Pain continues to be an issues. Tylenol #3 does alleviate pain from a 9 to a 5, but does not act quickly or long.    OBJECTIVE/PHYSICAL EXAM     VITAL SIGNS (MOST RECENT):  Temp: 97.9 °F (36.6 °C) (04/22/24 2318)  Pulse: 84 (04/23/24 0309)  Resp: 18 (04/23/24 0837)  BP: 130/70 (04/23/24 0309)  SpO2: 97 % (04/23/24 0309) VITAL SIGNS (24 HOUR RANGE):  Temp:  [97.6 °F (36.4 °C)-98.2 °F (36.8 °C)] 97.9 °F (36.6 °C)  Pulse:  [84-99] 84  Resp:  [18] 18  SpO2:  [97 %-99 %] 97 %  BP: (113-147)/(60-81) 130/70     GENERAL: In no acute distress, afebrile  HEENT:  Atraumatic, normocephalic, moist mucous membranes  CHEST: Clear to auscultation bilaterally  HEART: " S1, S2, no appreciable murmur  ABDOMEN: Soft, nontender, BS +  MSK: Warm, no lower extremity edema, RA changes throughout   NEUROLOGIC: Alert and oriented x4, moving all extremities   INTEGUMENTARY: Right hip incision without erythema or drainage   PSYCHIATRY: Appropriate mood and affect     ASSESSMENT/PLAN     S/p fall at home resulting in a right intertrochanteric femur fracture  Underwent IM nailing of right intertrochanteric femur fracture on 04/11/2024 with Dr. Tatum  WBAT to RLE, full ROM   Lovenox for DVT prevention, and orthopedics recommends aspirin 81 mg b.i.d. upon discharge, but patient is allergic.  PT/OT  Follow-up with Dr. Tatum on 04/30/2024 at 0800  Pain control as needed - continue prn Tylenol No. 3, lidocaine patch, adding p.r.n. oxycodone immediate release mg    Dysphagia  MBS on 04/18/2024 - SLP recommended soft and bite size diet with gravy and mildly thick liquids  Planning for esophagram next week      Post-op anemia   Stable     HTN   Reports newly diagnosed   Currently receiving Lisinopril 40mg QD     Rheumatoid arthritis, osteoarthritis  Continue home methylprednisone 4 mg daily - increased to 8mg QD x3 days   Lidocaine patch  PPI during admission     DVT prophylaxis: Lovenox     Anticipated discharge and disposition: Continue PT/OT  __________________________________________________________________________    LABS/MICRO/MEDS/DIAGNOSTICS     LABS  Recent Labs     04/23/24  0518      K 4.1   CHLORIDE 102   CO2 30   BUN 20.2   CREATININE 0.82   GLUCOSE 96   CALCIUM 9.1       Recent Labs     04/23/24  0518   WBC 7.74   RBC 3.46*   HCT 32.5*   MCV 93.9        MICROBIOLOGY  Microbiology Results (last 7 days)       Procedure Component Value Units Date/Time    Respiratory Culture [[id number]] Collected: 04/22/24 1926    Order Status: Sent Specimen: Sputum, Induced Updated: 04/22/24 1926             MEDICATIONS  Current Facility-Administered Medications   Medication Dose Route  "Frequency    calcium-vitamin D3  1 tablet Oral BID    enoxparin  40 mg Subcutaneous Daily    famotidine  20 mg Oral BID    LIDOcaine  1 patch Transdermal Q24H    LIDOcaine  1 patch Transdermal Q24H    lisinopriL  40 mg Oral Daily    methocarbamoL  750 mg Oral TID    senna-docusate 8.6-50 mg  2 tablet Oral BID         INFUSIONS  Current Facility-Administered Medications   Medication Dose Route Frequency Last Rate Last Admin        DIAGNOSTIC TESTS  X-Ray Abdomen Flat And Erect   Final Result      Moderate colonic fecal loading.  Nonspecific bowel gas pattern.         Electronically signed by: Orion Pettit   Date:    04/22/2024   Time:    15:05      Fl Modified Barium Swallow Speech   Final Result           No results found for: "EF"     NUTRITION STATUS  Patient meets ASPEN criteria for moderate malnutrition of acute illness or injury per RD assessment as evidenced by:  Energy Intake (Malnutrition): less than 75% for greater than 7 days  Weight Loss (Malnutrition): other (see comments) (-5.97% in recent months. Reported 10# wt loss pt stated)  Subcutaneous Fat (Malnutrition): moderate depletion  Muscle Mass (Malnutrition): moderate depletion           A minimum of two characteristics is recommended for diagnosis of either severe or non-severe malnutrition.     Case related differential diagnoses have been reviewed; assessment and plan has been documented. I have personally reviewed the labs and test results that are currently available; I have reviewed the patients medication list. I have reviewed the consulting providers recommendations. I have reviewed or attempted to review medical records based upon their availability.  All of the patient's and/or family's questions have been addressed and answered to the best of my ability.  I will continue to monitor closely and make adjustments to medical management as needed.  This document was created using M*Modal Fluency Direct.  Transcription errors may have been made.  " Please contact me if any questions may rise regarding documentation to clarify transcription.      JOEY Brooks   Internal Medicine  Department of Hospital Medicine [facility]

## 2024-04-23 NOTE — PT/OT/SLP PROGRESS
Speech Language Pathology Treatment    Patient Name:  Terence Tanner Jr.   MRN:  [id number]  Admitting Diagnosis: Closed fracture of right hip    Recommendations:                 General Recommendations:  Dysphagia therapy  Diet recommendations:   ,     Aspiration Precautions: 1 bite/sip at a time, Alternating bites/sips, Chin tuck, HOB to 90 degrees, and Meds crushed in puree   General Precautions: Standard,    Communication strategies:  go to room if call light pushed    Assessment:     Terence Tanner Jr. is a 79 y.o. male with an SLP diagnosis of Dysphagia.  He presents with moderate oropharyngeal dysphagia.    Subjective     Pt seen in bed, pleasant and cooperative.    Patient goals: Get leg better, stronger,  and return home     Pain/Comfort:       Respiratory Status: Room air    Objective:     Has the patient been evaluated by SLP for swallowing?   Yes   Keep patient NPO?     Current Respiratory Status:         Completed tongue base and pharyngeal exercises 10x/7. Tolerated mildly thickened liquids without s/s of aspiration.  Progressing towards short term goals.          Goals:   Multidisciplinary Problems       SLP Goals          Problem: SLP    Goal Priority Disciplines Outcome   SLP Goal     SLP    Description: Goals:   Long Term Goal:  1.  Tolerate least restrictive diet and liquids without s/s of aspiration  Short Term Goals:    1. Tolerate soft and bite sized diet with gravy (IDDSI 6) with mildly thick liquids  (IDDSI 2)  2. Complete tongue base and pharyngeal exercises 10x/5 sets  3. Verbalize and demonstrate swallowing strategies of chin tuck and alternating solids/liquids  4. Recommendation of possible esophagram-GI consult.                         Plan:     Patient to be seen:  3-5x/week    Plan of Care expires:  5/18/24   Plan of Care reviewed with:   Patient and treatment team   SLP Follow-Up:  yes        Discharge recommendations:      Barriers to Discharge:  Level of Skilled Assistance Needed   and  Safety Awareness      Time Tracking:     SLP Treatment Date: 4/23/24      Speech Start Time:   9:50  Speech Stop Time:    10:15    Speech Total Time (min):  25     Billable Minutes: Treatment Swallowing Dysfunction 25 04/23/2024

## 2024-04-23 NOTE — PT/OT/SLP PROGRESS
Physical Therapy Treatment Note           Name: Terence Tanner Jr.    : 1945 (79 y.o.)  MRN: [id number]           TREATMENT SUMMARY AND RECOMMENDATIONS:    PT Received On: 24  PT Start Time: 1305     PT Stop Time: 1330  PT Total Time (min): 25 min     Subjective Assessment:   No complaints  Lethargic   x Awake, alert, cooperative  Uncooperative   x Agitated x c/o pain    Appropriate x c/o fatigue    Confused  Treated at bedside     Emotionally labile x Treated in gym/dept.    Impulsive  Other:    Flat affect       Therapy Precautions:    Cognitive deficits  Spinal precautions    Collar - hard  Sternal precautions    Collar - soft   TLSO   x Fall risk  LSO    Hip precautions - posterior  Knee immobilizer    Hip precautions - anterior x WBAT    Impaired communication  Partial weightbearing    Oxygen  TTWB    PEG tube  NWB    Visual deficits  Other:currently NPO-awaiting ST assessment     Hearing deficits          Treatment Objectives:     Mobility Training:   Assist level Comments    Bed mobility     Transfer CGA/SBA Stand pivot with and without AD   Gait CGA  X 85 feet using RW, following with WC   Sit to stand transitions CGA/SBA Multiple times throughout session    Sitting balance     Standing balance      Wheelchair mobility     Car transfer     Other:          Therapeutic Exercise:   Exercise Sets Reps Comments         Seated B LE bike using UBE  5' 2.5'F/2.5'B   Seated B LE EX- AAROM R LE, AROM LLE  20 Hip flexion, knee ext, ankle PF/DF, hip add/abd isometrics              Additional Comments:  Pt was pre medicated for tx. Pt was able to fully complete bike today and needing less assistance with R LE exercises.     Assessment: Patient tolerated session better. Less pain noted - Pain did not impact functional mobility      PT Plan: continue per POC  Revisions made to plan of care: No    GOALS:   Multidisciplinary Problems       Physical Therapy Goals          Problem: Physical Therapy    Goal  Priority Disciplines Outcome Goal Variances Interventions   Physical Therapy Goal     PT, PT/OT Progressing     Description: Goals to be met by: Discharge      Patient will increase functional independence with mobility by performin. Sit to stand transfer with Modified [place]  2. Bed to chair transfer with Modified [place] using Rolling Walker  3. Gait  x at least 50 feet with Modified [place] and up to 175 feet with SBA using Rolling Walker.   4. Increased functional strength to 4/5 for R LE  5. Supine<>Sit with MOD I.                         Skilled PT Minutes Provided: 25   Communication with Treatment Team:     Equipment recommendations:       At end of treatment, patient remained:  x Up in chair     Up in wheelchair in room    In bed    With alarm activated    Bed rails up    Call bell in reach     Family/friends present    Restraints secured properly    In bathroom with CNA/RN notified    Nurse aware   x In gym with therapist/tech    Other:

## 2024-04-23 NOTE — PLAN OF CARE
[facility]  Discharge Reassessment    Primary Care Provider: No, Primary Doctor    Expected Discharge Date: 4/30/2024    Reassessment (most recent)       Discharge Reassessment - 04/23/24 1136          Discharge Reassessment    Assessment Type Discharge Planning Reassessment     Did the patient's condition or plan change since previous assessment? No     Discharge Plan discussed with: Spouse/sig other     Name(s) and Number(s) Amelia spouse [phone]     Communicated LIZABETH with patient/caregiver Date not available/Unable to determine     Discharge Plan A Home Health;Home with family     DME Needed Upon Discharge  none     Transition of Care Barriers None     Why the patient remains in the hospital Requires continued medical care        Post-Acute Status    Post-Acute Authorization Home Health     Home Health Status Pending medical clearance/testing     Hospital Resources/Appts/Education Provided Provided patient/caregiver with written discharge plan information     Discharge Delays None known at this time

## 2024-04-23 NOTE — PT/OT/SLP PROGRESS
Physical Therapy Treatment Note           Name: Terence Tanner Jr.    : 1945 (79 y.o.)  MRN: [id number]           TREATMENT SUMMARY AND RECOMMENDATIONS:    PT Received On: 24  PT Start Time: 1030     PT Stop Time: 1055  PT Total Time (min): 25 min     Subjective Assessment:   No complaints  Lethargic    Awake, alert, cooperative  Uncooperative    Agitated x c/o pain    Appropriate  c/o fatigue    Confused  Treated at bedside     Emotionally labile  Treated in gym/dept.    Impulsive  Other:    Flat affect       Therapy Precautions:    Cognitive deficits  Spinal precautions    Collar - hard  Sternal precautions    Collar - soft   TLSO    Fall risk  LSO    Hip precautions - posterior  Knee immobilizer    Hip precautions - anterior  WBAT    Impaired communication  Partial weightbearing    Oxygen  TTWB    PEG tube  NWB    Visual deficits  Other:    Hearing deficits          Treatment Objectives:     Mobility Training:   Assist level Comments    Bed mobility     Transfer     Gait CGA Amb on firm surface with RW 25 ft    Sit to stand transitions CGA Sit-stand with B UE use   Sitting balance     Standing balance  SBA Pt able to perform hygiene while standing on firm surface    Wheelchair mobility     Car transfer     Other:          Therapeutic Exercise:   Exercise Sets Reps Comments   B LE exer sitting  2 10 Ankle pumps, TKE, abd/add, knee lifts                          Additional Comments:  Pain noted during treatment     Assessment: Patient tolerated session fair.    PT Plan: continue  Revisions made to plan of care: No    GOALS:   Multidisciplinary Problems       Physical Therapy Goals          Problem: Physical Therapy    Goal Priority Disciplines Outcome Goal Variances Interventions   Physical Therapy Goal     PT, PT/OT Progressing     Description: Goals to be met by: Discharge      Patient will increase functional independence with mobility by performin. Sit to stand transfer with Modified  [place]  2. Bed to chair transfer with Modified [place] using Rolling Walker  3. Gait  x at least 50 feet with Modified [place] and up to 175 feet with SBA using Rolling Walker.   4. Increased functional strength to 4/5 for R LE  5. Supine<>Sit with MOD I.                         Skilled PT Minutes Provided: 25   Communication with Treatment Team:     Equipment recommendations:       At end of treatment, patient remained:   Up in chair     Up in wheelchair in room    In bed    With alarm activated    Bed rails up    Call bell in reach     Family/friends present    Restraints secured properly    In bathroom with CNA/RN notified    Nurse aware   x In gym with therapist/tech    Other:

## 2024-04-23 NOTE — PT/OT/SLP PROGRESS
Occupational Therapy  Treatment    Name: Terence Tanner Jr.    : 1945 (79 y.o.)  MRN: [id number]           TREATMENT SUMMARY AND RECOMMENDATIONS:      OT Date of Treatment: 24  OT Start Time: 1100  OT Stop Time: 1128  OT Total Time (min): 28 min      Subjective Assessment:   No complaints  Lethargic   X Awake, alert, cooperative  Impulsive    Uncooperative   Flat affect    Agitated X c/o pain    Appropriate  c/o fatigue    Confused  Treated at bedside     Emotionally labile X Treated in gym/dept.      Other:        Therapy Precautions:    Cognitive deficits  Spinal precautions    Collar - hard  Sternal precautions    Collar - soft   TLSO   X Fall risk  LSO    Hip precautions - posterior  Knee immobilizer    Hip precautions - anterior X WBAT    Impaired communication  Partial weightbearing    Oxygen  TTWB    PEG tube  NWB    Visual deficits      Hearing deficits  X Other: ROMAT       Treatment Objectives:     Mobility Training:    Mobility task Assist level Comments    Bed mobility SBA EOB>supine   Transfer CGA Stand step t/f c RW chair>w/c>EOB   Sit to stands transitions     Functional mobility     Sitting balance     Standing balance      Other:            Therapeutic Exercise:   Exercise Sets Reps Comments   UBE 2 5' F/B Mod resistance   Flexbar 2 10 With yellow flexbar, pt perf forearm neutral<>pronation  Forearm neutral<>supination                   Assessment: Patient tolerated session fair. Pt continues to be limited by R hip pain impacting sessions. Pt reported being unable to put full weight onto RLE 2/2 pain. Pt would continue to benefit from skilled OT services to improve pt's safety and independence with daily occupations, decrease caregiver burden, and reduce pt's risk of falls.    GOALS:   Multidisciplinary Problems       Occupational Therapy Goals          Problem: Occupational Therapy    Goal Priority Disciplines Outcome Interventions   Occupational Therapy Goal     OT, PT/OT Progressing     Description: Goals to be met by: 5/17/2024     Patient will increase functional independence with ADLs by performing:    LE Dressing with Stand-by Assistance.  Grooming while standing at sink with Stand-by Assistance.  Toileting from toilet with Stand-by Assistance for hygiene and clothing management.   Toilet transfer to toilet with Stand-by Assistance.                         Recommendations:     Discharge Equipment Recommendations:  none     Plan:     Patient to be seen  (5-6x/week (QD-BID)) to address the above listed problems via self-care/home management, neuromuscular re-education, therapeutic activities, therapeutic exercises  Plan of Care Expires: 05/17/24  Plan of Care Reviewed with: patient, spouse  Revisions made to plan of care: No      Skilled OT Minutes Provided: 28  Communication with Treatment Team:     Equipment recommendations:       At end of treatment, patient remained:   Up in chair     Up in wheelchair in room   X In bed   X With alarm activated   X Bed rails up   X Call bell in reach    X Family/friends present    Restraints secured properly    In bathroom with CNA/RN notified    In gym with PT/PTA/tech    Nurse aware    Other:

## 2024-04-23 NOTE — PLAN OF CARE
Problem: Adult Inpatient Plan of Care  Goal: Plan of Care Review  Outcome: Ongoing, Progressing  Flowsheets (Taken 4/23/2024 0909)  Plan of Care Reviewed With:   patient   spouse  Goal: Patient-Specific Goal (Individualized)  Outcome: Ongoing, Progressing  Flowsheets (Taken 4/23/2024 0909)  Anxieties, Fears or Concerns: Right leg pain  Individualized Care Needs: pain management, meds crushed in pudding, safety with transfers to [facility], meds crushed in applesauce  Goal: Absence of Hospital-Acquired Illness or Injury  Outcome: Ongoing, Progressing  Intervention: Identify and Manage Fall Risk  Flowsheets (Taken 4/23/2024 0909)  Safety Promotion/Fall Prevention:   assistive device/personal item within reach   bed alarm set   medications reviewed   nonskid shoes/socks when out of bed   side rails raised x 3   instructed to call staff for mobility  Intervention: Prevent Skin Injury  Flowsheets (Taken 4/23/2024 0909)  Body Position:   position changed independently   log-rolled   weight shifting   sitting up in bed   supine  Skin Protection:   adhesive use limited   incontinence pads utilized   transparent dressing maintained   tubing/devices free from skin contact  Intervention: Prevent and Manage VTE (Venous Thromboembolism) Risk  Flowsheets (Taken 4/23/2024 0909)  Activity Management: Rolling - L1  VTE Prevention/Management:   bleeding precations maintained   bleeding risk assessed  Intervention: Prevent Infection  Flowsheets (Taken 4/23/2024 0909)  Infection Prevention:   cohorting utilized   environmental surveillance performed   equipment surfaces disinfected   hand hygiene promoted   single patient room provided   rest/sleep promoted  Goal: Optimal Comfort and Wellbeing  Outcome: Ongoing, Progressing  Intervention: Monitor Pain and Promote Comfort  Flowsheets (Taken 4/23/2024 0909)  Pain Management Interventions:   care clustered   pillow support provided   position adjusted   medication offered  Intervention:  Provide Person-Centered Care  Flowsheets (Taken 4/23/2024 0909)  Trust Relationship/Rapport:   care explained   questions answered   questions encouraged  Goal: Readiness for Transition of Care  Outcome: Ongoing, Progressing     Problem: Infection  Goal: Absence of Infection Signs and Symptoms  Outcome: Ongoing, Progressing  Intervention: Prevent or Manage Infection  Flowsheets (Taken 4/23/2024 0909)  Infection Management: aseptic technique maintained     Problem: Fall Injury Risk  Goal: Absence of Fall and Fall-Related Injury  Outcome: Ongoing, Progressing  Intervention: Identify and Manage Contributors  Flowsheets (Taken 4/23/2024 0909)  Self-Care Promotion:   independence encouraged   BADL personal objects within reach   meal set-up provided   safe use of adaptive equipment encouraged  Medication Review/Management: medications reviewed  Intervention: Promote Injury-Free Environment  Flowsheets (Taken 4/23/2024 0909)  Safety Promotion/Fall Prevention:   assistive device/personal item within reach   bed alarm set   medications reviewed   nonskid shoes/socks when out of bed   side rails raised x 3   instructed to call staff for mobility     Problem: Swallowing Impairment  Goal: Optimal Eating and Swallowing Without Aspiration  Outcome: Ongoing, Progressing  Intervention: Optimize Eating and Swallowing  Flowsheets (Taken 4/23/2024 0909)  Aspiration Precautions: awake/alert before oral intake     Problem: Hypertension Comorbidity  Goal: Blood Pressure in Desired Range  Outcome: Ongoing, Progressing  Intervention: Maintain Blood Pressure Management  Flowsheets (Taken 4/23/2024 0909)  Medication Review/Management: medications reviewed     Problem: Osteoarthritis Comorbidity  Goal: Maintenance of Osteoarthritis Symptom Control  Outcome: Ongoing, Progressing  Intervention: Maintain Osteoarthritis Symptom Control  Flowsheets (Taken 4/23/2024 0909)  Activity Management: Rolling - L1  Medication Review/Management:  medications reviewed     Problem: Pain Chronic (Persistent) (Comorbidity Management)  Goal: Acceptable Pain Control and Functional Ability  Outcome: Ongoing, Progressing  Intervention: Develop Pain Management Plan  Flowsheets (Taken 4/23/2024 0909)  Pain Management Interventions:   care clustered   pillow support provided   position adjusted   medication offered  Intervention: Manage Persistent Pain  Flowsheets (Taken 4/23/2024 0909)  Bowel Elimination Promotion:   adequate fluid intake promoted   ambulation promoted  Medication Review/Management: medications reviewed

## 2024-04-24 PROCEDURE — 63600175 PHARM REV CODE 636 W HCPCS: Performed by: PHYSICIAN ASSISTANT

## 2024-04-24 PROCEDURE — 11000004 HC SNF PRIVATE

## 2024-04-24 PROCEDURE — 97530 THERAPEUTIC ACTIVITIES: CPT

## 2024-04-24 PROCEDURE — 25000003 PHARM REV CODE 250: Performed by: INTERNAL MEDICINE

## 2024-04-24 PROCEDURE — 99900035 HC TECH TIME PER 15 MIN (STAT)

## 2024-04-24 PROCEDURE — 97535 SELF CARE MNGMENT TRAINING: CPT

## 2024-04-24 PROCEDURE — 94760 N-INVAS EAR/PLS OXIMETRY 1: CPT

## 2024-04-24 PROCEDURE — 92526 ORAL FUNCTION THERAPY: CPT

## 2024-04-24 PROCEDURE — 97110 THERAPEUTIC EXERCISES: CPT

## 2024-04-24 PROCEDURE — 25000003 PHARM REV CODE 250: Performed by: STUDENT IN AN ORGANIZED HEALTH CARE EDUCATION/TRAINING PROGRAM

## 2024-04-24 PROCEDURE — 25000003 PHARM REV CODE 250: Performed by: PHYSICIAN ASSISTANT

## 2024-04-24 PROCEDURE — 97116 GAIT TRAINING THERAPY: CPT

## 2024-04-24 RX ORDER — ACETAMINOPHEN AND CODEINE PHOSPHATE 300; 30 MG/1; MG/1
2 TABLET ORAL EVERY 4 HOURS PRN
Status: DISCONTINUED | OUTPATIENT
Start: 2024-04-24 | End: 2024-04-24

## 2024-04-24 RX ORDER — CIPROFLOXACIN 500 MG/1
500 TABLET ORAL EVERY 12 HOURS
Status: DISPENSED | OUTPATIENT
Start: 2024-04-24 | End: 2024-04-29

## 2024-04-24 RX ORDER — ACETAMINOPHEN AND CODEINE PHOSPHATE 300; 30 MG/1; MG/1
1 TABLET ORAL
Status: DISCONTINUED | OUTPATIENT
Start: 2024-04-24 | End: 2024-05-01 | Stop reason: HOSPADM

## 2024-04-24 RX ADMIN — FAMOTIDINE 20 MG: 20 TABLET, FILM COATED ORAL at 08:04

## 2024-04-24 RX ADMIN — METHOCARBAMOL 750 MG: 750 TABLET ORAL at 08:04

## 2024-04-24 RX ADMIN — ACETAMINOPHEN AND CODEINE PHOSPHATE 1 TABLET: 300; 30 TABLET ORAL at 09:04

## 2024-04-24 RX ADMIN — Medication 1 TABLET: at 08:04

## 2024-04-24 RX ADMIN — SENNOSIDES AND DOCUSATE SODIUM 2 TABLET: 8.6; 5 TABLET ORAL at 08:04

## 2024-04-24 RX ADMIN — BISACODYL 10 MG: 10 SUPPOSITORY RECTAL at 03:04

## 2024-04-24 RX ADMIN — ACETAMINOPHEN AND CODEINE PHOSPHATE 1 TABLET: 300; 30 TABLET ORAL at 06:04

## 2024-04-24 RX ADMIN — CIPROFLOXACIN HYDROCHLORIDE 500 MG: 500 TABLET, FILM COATED ORAL at 11:04

## 2024-04-24 RX ADMIN — LIDOCAINE 5% 1 PATCH: 700 PATCH TOPICAL at 08:04

## 2024-04-24 RX ADMIN — CIPROFLOXACIN HYDROCHLORIDE 500 MG: 500 TABLET, FILM COATED ORAL at 08:04

## 2024-04-24 RX ADMIN — LISINOPRIL 40 MG: 20 TABLET ORAL at 08:04

## 2024-04-24 RX ADMIN — POLYETHYLENE GLYCOL 3350 17 G: 17 POWDER, FOR SOLUTION ORAL at 12:04

## 2024-04-24 RX ADMIN — METHOCARBAMOL 750 MG: 750 TABLET ORAL at 03:04

## 2024-04-24 RX ADMIN — ACETAMINOPHEN AND CODEINE PHOSPHATE 1 TABLET: 300; 30 TABLET ORAL at 12:04

## 2024-04-24 RX ADMIN — ENOXAPARIN SODIUM 40 MG: 40 INJECTION SUBCUTANEOUS at 05:04

## 2024-04-24 NOTE — PT/OT/SLP PROGRESS
Occupational Therapy  Treatment    Name: Terence Tanner Jr.    : 1945 (79 y.o.)  MRN: [id number]           TREATMENT SUMMARY AND RECOMMENDATIONS:      OT Date of Treatment: 24  OT Start Time: 1000  OT Stop Time: 1025  OT Total Time (min): 25 min      Subjective Assessment:   No complaints  Lethargic   X Awake, alert, cooperative  Impulsive    Uncooperative   Flat affect    Agitated X c/o pain    Appropriate  c/o fatigue    Confused  Treated at bedside     Emotionally labile X Treated in gym/dept.      Other:        Therapy Precautions:    Cognitive deficits  Spinal precautions    Collar - hard  Sternal precautions    Collar - soft   TLSO   X Fall risk  LSO    Hip precautions - posterior  Knee immobilizer    Hip precautions - anterior X WBAT    Impaired communication  Partial weightbearing    Oxygen  TTWB    PEG tube  NWB    Visual deficits      Hearing deficits  X Other: ROMAT       Treatment Objectives:     Mobility Training:    Mobility task Assist level Comments    Bed mobility CGA EOB>supine   Transfer     Sit to stands transitions SBA From chair level c BUE support   Functional mobility SBA Pt ambulated ~100ft c RW.    Sitting balance     Standing balance      Other:        Therapeutic Exercise:   Exercise Sets Reps Comments   UB strengthening 2 15 With 3# dowel, pt perf Shoulder press, chest press, straight arm raises, bicep curls, forward rows, backwards rows. Min v/s for proper technique and positioning. Mod length r/bs PRN 2/2 pain.                          Assessment: Patient tolerated session well. Pt demonstrates improvements in improvements in FM and fxnl ax tolerance this session compared to previous sessions. Pt progressing towards established goals. Pt continues to demonstrate deficits in FM, fxnl endurance, fxnl ax tolerance, fxnl reaching to ground level, balance, and pain impacting occupational performance. Pt would continue to benefit from skilled OT services to improve pt's safety and  independence with daily occupations, decrease caregiver burden, and reduce pt's risk of falls.     GOALS:   Multidisciplinary Problems       Occupational Therapy Goals          Problem: Occupational Therapy    Goal Priority Disciplines Outcome Interventions   Occupational Therapy Goal     OT, PT/OT Progressing    Description: Goals to be met by: 5/17/2024     Patient will increase functional independence with ADLs by performing:    LE Dressing with Stand-by Assistance.  Grooming while standing at sink with Stand-by Assistance.  Toileting from toilet with Stand-by Assistance for hygiene and clothing management.   Toilet transfer to toilet with Stand-by Assistance.                         Recommendations:     Discharge Equipment Recommendations:  none     Plan:     Patient to be seen  (5-6x/week (QD-BID)) to address the above listed problems via self-care/home management, neuromuscular re-education, therapeutic activities, therapeutic exercises  Plan of Care Expires: 05/17/24  Plan of Care Reviewed with: patient, spouse  Revisions made to plan of care: No      Skilled OT Minutes Provided: 25  Communication with Treatment Team:     Equipment recommendations:       At end of treatment, patient remained:   Up in chair     Up in wheelchair in room   X In bed   X With alarm activated   X Bed rails up   X Call bell in reach     Family/friends present    Restraints secured properly    In bathroom with CNA/RN notified    In gym with PT/PTA/tech    Nurse aware    Other:

## 2024-04-24 NOTE — PT/OT/SLP PROGRESS
Physical Therapy Treatment Note           Name: Terence Tanner Jr.    : 1945 (79 y.o.)  MRN: [id number]           TREATMENT SUMMARY AND RECOMMENDATIONS:    PT Received On: 24  PT Start Time: 1328     PT Stop Time: 1356  PT Total Time (min): 28 min     Subjective Assessment:   No complaints  Lethargic   x Awake, alert, cooperative  Uncooperative   x Agitated x c/o pain    Appropriate x c/o fatigue    Confused  Treated at bedside     Emotionally labile x Treated in gym/dept.    Impulsive  Other:    Flat affect       Therapy Precautions:    Cognitive deficits  Spinal precautions    Collar - hard  Sternal precautions    Collar - soft   TLSO   x Fall risk  LSO    Hip precautions - posterior  Knee immobilizer    Hip precautions - anterior x WBAT    Impaired communication  Partial weightbearing    Oxygen  TTWB    PEG tube  NWB    Visual deficits  Other:currently NPO-awaiting ST assessment     Hearing deficits          Treatment Objectives:     Mobility Training:   Assist level Comments    Bed mobility SBA Sit>supine   Transfer CGA/SBA Stand pivot with and without AD   Gait CGA  X 85 feet using RW, following with WC   Sit to stand transitions CGA/SBA Multiple times throughout session    Sitting balance     Standing balance      Wheelchair mobility     Car transfer     Other:          Therapeutic Exercise:   Exercise Sets Reps Comments         Seated B LE bike using UBE  5' 2.5'F/2.5'B   Standing R LE EX  20 , SLR             Additional Comments:  Pt was pre medicated for tx. Pt with improved R LE AROM during all exercises. PT to progress as able.     Assessment: Patient tolerated session better.     PT Plan: continue per POC  Revisions made to plan of care: No    GOALS:   Multidisciplinary Problems       Physical Therapy Goals          Problem: Physical Therapy    Goal Priority Disciplines Outcome Goal Variances Interventions   Physical Therapy Goal     PT, PT/OT Progressing     Description: Goals  to be met by: Discharge      Patient will increase functional independence with mobility by performin. Sit to stand transfer with Modified [place]  2. Bed to chair transfer with Modified [place] using Rolling Walker  3. Gait  x at least 50 feet with Modified [place] and up to 175 feet with SBA using Rolling Walker.   4. Increased functional strength to 4/5 for R LE  5. Supine<>Sit with MOD I.                         Skilled PT Minutes Provided: 25   Communication with Treatment Team:     Equipment recommendations:       At end of treatment, patient remained:   Up in chair     Up in wheelchair in room   x In bed    With alarm activated   x Bed rails up   x Call bell in reach    x Family/friends present    Restraints secured properly    In bathroom with CNA/RN notified    Nurse aware    In gym with therapist/tech    Other:

## 2024-04-24 NOTE — PT/OT/SLP PROGRESS
Physical Therapy Treatment Note           Name: Terence Tanner Jr.    : 1945 (79 y.o.)  MRN: [id number]           TREATMENT SUMMARY AND RECOMMENDATIONS:    PT Received On: 24  PT Start Time: 932     PT Stop Time: 958  PT Total Time (min): 26 min     Subjective Assessment:   No complaints  Lethargic   x Awake, alert, cooperative  Uncooperative   x Agitated x c/o pain    Appropriate x c/o fatigue    Confused  Treated at bedside     Emotionally labile x Treated in gym/dept.    Impulsive  Other:    Flat affect       Therapy Precautions:    Cognitive deficits  Spinal precautions    Collar - hard  Sternal precautions    Collar - soft   TLSO   x Fall risk  LSO    Hip precautions - posterior  Knee immobilizer    Hip precautions - anterior x WBAT    Impaired communication  Partial weightbearing    Oxygen  TTWB    PEG tube  NWB    Visual deficits  Other:currently NPO-awaiting ST assessment     Hearing deficits          Treatment Objectives:     Mobility Training:   Assist level Comments    Bed mobility SBA Supine>Sit   Transfer CGA/SBA Stand pivot with and without AD   Gait CGA  X 85 feet using RW, following with WC   Sit to stand transitions SBA Multiple times throughout session    Sitting balance     Standing balance      Wheelchair mobility     Car transfer     Other:          Therapeutic Exercise:   Exercise Sets Reps Comments               Seated B LE EX-   20 Hip flexion, knee ext, ankle PF/DF, hip add/abd isometrics 2# on LLE, 0# on RLE             Additional Comments:  Pt able to complete AROM on R LE today. Pt was brought outside for therapy to help improve mood, but pt reported the did not like sunshine and wanted to go back inside.      Assessment: Patient tolerated session better as evidenced by improved strength in R LE. No demonstrating 3-/5 to 3/5      PT Plan: continue per POC  Revisions made to plan of care: No    GOALS:   Multidisciplinary Problems       Physical Therapy Goals           Problem: Physical Therapy    Goal Priority Disciplines Outcome Goal Variances Interventions   Physical Therapy Goal     PT, PT/OT Progressing     Description: Goals to be met by: Discharge      Patient will increase functional independence with mobility by performin. Sit to stand transfer with Modified [place]  2. Bed to chair transfer with Modified [place] using Rolling Walker  3. Gait  x at least 50 feet with Modified [place] and up to 175 feet with SBA using Rolling Walker.   4. Increased functional strength to 4/5 for R LE  5. Supine<>Sit with MOD I.                         Skilled PT Minutes Provided: 25   Communication with Treatment Team:     Equipment recommendations:       At end of treatment, patient remained:  x Up in chair     Up in wheelchair in room    In bed    With alarm activated    Bed rails up    Call bell in reach     Family/friends present    Restraints secured properly    In bathroom with CNA/RN notified    Nurse aware   x In gym with therapist/tech    Other:

## 2024-04-24 NOTE — PT/OT/SLP PROGRESS
Name: Terence Tanner Jr.    : 1945 (79 y.o.)  MRN: [id number]            Interdisciplinary Team Conference     Case conference held with patient/family and care team to discuss progress, plan of care, barriers to be addressed for safe return home, equipment recommendations, and discharge planning. Communicated therapy progress with MD, RN, therapy clinicians and case management. All questions/concerns answered.

## 2024-04-24 NOTE — PT/OT/SLP PROGRESS
Occupational Therapy  Treatment    Name: Terence Tanner Jr.    : 1945 (79 y.o.)  MRN: [id number]           TREATMENT SUMMARY AND RECOMMENDATIONS:      OT Date of Treatment: 24  OT Start Time: 1305  OT Stop Time: 1328  OT Total Time (min): 23 min      Subjective Assessment:   No complaints  Lethargic   X Awake, alert, cooperative  Impulsive    Uncooperative   Flat affect    Agitated X c/o pain    Appropriate  c/o fatigue    Confused  Treated at bedside     Emotionally labile X Treated in gym/dept.      Other:        Therapy Precautions:    Cognitive deficits  Spinal precautions    Collar - hard  Sternal precautions    Collar - soft   TLSO   X Fall risk  LSO    Hip precautions - posterior  Knee immobilizer    Hip precautions - anterior X WBAT    Impaired communication  Partial weightbearing    Oxygen  TTWB    PEG tube  NWB    Visual deficits      Hearing deficits  X Other: ROMAT       Treatment Objectives:     Mobility Training:    Mobility task Assist level Comments    Bed mobility SBA Supine>EOB   Transfer     Sit to stands transitions SBA From EOB level   Functional mobility SBA Pt ambulated ~100ft c RW.    Sitting balance     Standing balance      Other:        ADL Training:    ADL Assist level Comments    Feeding     Grooming/hygiene     Bathing     Upper body dressing     Lower body dressing Setup Pt demonstrated ability to rupali RLE knee brace semi supine.    Toileting     Toilet transfer     Adaptive equipment training     Other:           Therapeutic Exercise:   Exercise Sets Reps Comments   UBE 2 5' F/B Mod resistance                         Assessment: Patient tolerated session well. Pt continues to be limited by pain. Per family report pt only ambulated short distances and home and utilized rollator (sitting on rollator and propelling c BLE) for mobility. Per family report, a rollator is the only DME that the pt's wife is able to carry and transport. Pt would continue to benefit from skilled OT  services to improve pt's safety and independence with daily occupations, decrease caregiver support, and reduce pt's risk of falls.     GOALS:   Multidisciplinary Problems       Occupational Therapy Goals          Problem: Occupational Therapy    Goal Priority Disciplines Outcome Interventions   Occupational Therapy Goal     OT, PT/OT Progressing    Description: Goals to be met by: 5/17/2024     Patient will increase functional independence with ADLs by performing:    LE Dressing with Stand-by Assistance.  Grooming while standing at sink with Stand-by Assistance.  Toileting from toilet with Stand-by Assistance for hygiene and clothing management.   Toilet transfer to toilet with Stand-by Assistance.                         Recommendations:     Discharge Equipment Recommendations:  none     Plan:     Patient to be seen  (5-6x/week (QD-BID)) to address the above listed problems via self-care/home management, neuromuscular re-education, therapeutic activities, therapeutic exercises  Plan of Care Expires: 05/17/24  Plan of Care Reviewed with: patient, spouse  Revisions made to plan of care: No      Skilled OT Minutes Provided: 23  Communication with Treatment Team:     Equipment recommendations:       At end of treatment, patient remained:   Up in chair     Up in wheelchair in room    In bed    With alarm activated    Bed rails up    Call bell in reach     Family/friends present    Restraints secured properly    In bathroom with CNA/RN notified   X In gym with PT/PTA/tech    Nurse aware    Other:

## 2024-04-24 NOTE — PROGRESS NOTES
Name: Terence Tanner Jr.    : 1945 (79 y.o.)  MRN: [id number]          Interdisciplinary Team Conference     Case conference held with patient/family and care team to discuss progress, plan of care, barriers to be addressed for safe return home, equipment recommendations, and discharge planning. Communicated therapy progress with MD, RN, therapy clinicians and case management. All questions/concerns answered.

## 2024-04-24 NOTE — PT/OT/SLP PROGRESS
Name: Terence Tanner Jr.    : 1945 (79 y.o.)  MRN: [id number]            Interdisciplinary Team Conference     Case conference held with patient/family and care team to discuss progress, plan of care, barriers to be addressed for safe return home, equipment recommendations, and discharge planning. Communicated therapy progress with MD, RN, therapy clinicians and case management. All questions/concerns answered.

## 2024-04-24 NOTE — PT/OT/SLP PROGRESS
Speech Language Pathology Treatment    Patient Name:  Terence Tanner Jr.   MRN:  [id number]  Admitting Diagnosis: Closed fracture of right hip    Recommendations:                 General Recommendations:  Dysphagia therapy  Diet recommendations:  Soft bite sized diet with gravy (IDDSI 6) with mildly thickened liquids (IDDSI 2) ,     Aspiration Precautions: 1 bite/sip at a time, Alternating bites/sips, Chin tuck, HOB to 90 degrees, and Meds crushed in puree   General Precautions: Standard,    Communication strategies:  go to room if call light pushed    Assessment:     Terence Tanner Jr. is a 79 y.o. male with an SLP diagnosis of Dysphagia.  He presents with moderate oropharyngeal dysphagia.    Subjective     Pt seen in bed, pleasant and cooperative.  Wife present for session.  Patient goals: Get leg better, stronger,  and return home     Pain/Comfort:       Respiratory Status: Room air    Objective:     Has the patient been evaluated by SLP for swallowing?   Yes   Keep patient NPO?     Current Respiratory Status:         Completed tongue base and pharyngeal exercises 10x/5. Tolerated mildly thickened liquids without s/s of aspiration.  Throat clearing present with meds/pudding,  Educating on utilizing chin tuck to assist with clearing residue and alternating solids/liquids.  Pr verbalized and demonstrated understanding.  Progressing towards short term goals.          Goals:   Multidisciplinary Problems       SLP Goals          Problem: SLP    Goal Priority Disciplines Outcome   SLP Goal     SLP    Description: Goals:   Long Term Goal:  1.  Tolerate least restrictive diet and liquids without s/s of aspiration  Short Term Goals:    1. Tolerate soft and bite sized diet with gravy (IDDSI 6) with mildly thick liquids  (IDDSI 2)  2. Complete tongue base and pharyngeal exercises 10x/5 sets  3. Verbalize and demonstrate swallowing strategies of chin tuck and alternating solids/liquids  4. Recommendation of possible  esophagram-GI consult.                         Plan:     Patient to be seen:  3-5x/week    Plan of Care expires:  5/18/24   Plan of Care reviewed with:   Patient and treatment team   SLP Follow-Up:  yes        Discharge recommendations:      Barriers to Discharge:  Level of Skilled Assistance Needed   and Safety Awareness      Time Tracking:     SLP Treatment Date: 4/23/24      Speech Start Time:   9:00  Speech Stop Time:    9:30  Speech Total Time (min):  30    Billable Minutes: Treatment Swallowing Dysfunction 30 04/24/2024

## 2024-04-24 NOTE — PLAN OF CARE
Problem: Adult Inpatient Plan of Care  Goal: Plan of Care Review  Outcome: Progressing  Flowsheets (Taken 4/23/2024 2046)  Plan of Care Reviewed With: patient  Goal: Patient-Specific Goal (Individualized)  Outcome: Progressing  Flowsheets (Taken 4/23/2024 2046)  Anxieties, Fears or Concerns: none expressed  Individualized Care Needs: safety, pain mgt, meds crushed in pudding  Goal: Absence of Hospital-Acquired Illness or Injury  Outcome: Progressing  Intervention: Identify and Manage Fall Risk  Flowsheets (Taken 4/23/2024 2046)  Safety Promotion/Fall Prevention:   assistive device/personal item within reach   bed alarm set   side rails raised x 3  Intervention: Prevent Skin Injury  Flowsheets (Taken 4/23/2024 2046)  Body Position: position changed independently  Skin Protection: drying agents applied  Device Skin Pressure Protection: absorbent pad utilized/changed  Intervention: Prevent and Manage VTE (Venous Thromboembolism) Risk  Flowsheets (Taken 4/23/2024 2046)  VTE Prevention/Management: bleeding precations maintained  Intervention: Prevent Infection  Flowsheets (Taken 4/23/2024 2046)  Infection Prevention:   cohorting utilized   hand hygiene promoted   personal protective equipment utilized  Goal: Optimal Comfort and Wellbeing  Outcome: Progressing  Intervention: Monitor Pain and Promote Comfort  Flowsheets (Taken 4/23/2024 2046)  Pain Management Interventions: care clustered  Intervention: Provide Person-Centered Care  Flowsheets (Taken 4/23/2024 2046)  Trust Relationship/Rapport:   care explained   choices provided   questions encouraged   reassurance provided   thoughts/feelings acknowledged  Goal: Readiness for Transition of Care  Outcome: Progressing  Intervention: Mutually Develop Transition Plan  Flowsheets (Taken 4/23/2024 2046)  Equipment Currently Used at Home:   bedside commode   walker, rolling  Transportation Anticipated: family or friend will provide  Who are your caregiver(s) and their phone  number(s)?:  Amelia (spouse)  Communicated LIZABETH with patient/caregiver: Date not available/Unable to determine  Do you expect to return to your current living situation?: Yes  Do you have help at home or someone to help you manage your care at home?: Yes  Readmission within 30 days?: No  Do you currently have service(s) that help you manage your care at home?: No  Is the pt/caregiver preference to resume services with current agency: No     Problem: Infection  Goal: Absence of Infection Signs and Symptoms  Outcome: Progressing  Intervention: Prevent or Manage Infection  Flowsheets (Taken 4/23/2024 2046)  Fever Reduction/Comfort Measures:   lightweight bedding   lightweight clothing  Infection Management: aseptic technique maintained  Isolation Precautions: precautions maintained     Problem: Fall Injury Risk  Goal: Absence of Fall and Fall-Related Injury  Outcome: Progressing  Intervention: Identify and Manage Contributors  Flowsheets (Taken 4/23/2024 2046)  Self-Care Promotion:   independence encouraged   BADL personal objects within reach   BADL personal routines maintained  Medication Review/Management: medications reviewed  Intervention: Promote Injury-Free Environment  Flowsheets (Taken 4/23/2024 2046)  Safety Promotion/Fall Prevention:   assistive device/personal item within reach   bed alarm set   side rails raised x 3     Problem: Swallowing Impairment  Goal: Optimal Eating and Swallowing Without Aspiration  Outcome: Progressing  Intervention: Optimize Eating and Swallowing  Flowsheets (Taken 4/23/2024 2046)  Aspiration Precautions: awake/alert before oral intake  Swallowing Interventions: Dysphagia:   food and liquid intake alternated   monitored for cough during intake   small bites/sips encouraged  Swallowing Method: throat clear/extra swallow     Problem: Hypertension Comorbidity  Goal: Blood Pressure in Desired Range  Outcome: Progressing  Intervention: Maintain Blood Pressure  Management  Flowsheets (Taken 4/23/2024 2046)  Medication Review/Management: medications reviewed     Problem: Osteoarthritis Comorbidity  Goal: Maintenance of Osteoarthritis Symptom Control  Outcome: Progressing  Intervention: Maintain Osteoarthritis Symptom Control  Flowsheets (Taken 4/23/2024 2046)  Assistive Device Utilized:   gait belt   walker  Activity Management: Rolling - L1  Medication Review/Management: medications reviewed     Problem: Pain Chronic (Persistent) (Comorbidity Management)  Goal: Acceptable Pain Control and Functional Ability  Outcome: Progressing  Intervention: Develop Pain Management Plan  Flowsheets (Taken 4/23/2024 2046)  Pain Management Interventions: care clustered  Intervention: Manage Persistent Pain  Flowsheets (Taken 4/23/2024 2046)  Sleep/Rest Enhancement:   awakenings minimized   regular sleep/rest pattern promoted   noise level reduced   relaxation techniques promoted  Bowel Elimination Promotion: adequate fluid intake promoted  Medication Review/Management: medications reviewed  Intervention: Optimize Psychosocial Wellbeing  Flowsheets (Taken 4/23/2024 2046)  Supportive Measures: active listening utilized  Diversional Activities: television  Family/Support System Care: self-care encouraged

## 2024-04-24 NOTE — PLAN OF CARE
Problem: Adult Inpatient Plan of Care  Goal: Plan of Care Review  Outcome: Progressing  Flowsheets (Taken 4/24/2024 1255)  Plan of Care Reviewed With:   patient   spouse  Goal: Patient-Specific Goal (Individualized)  Outcome: Progressing  Flowsheets (Taken 4/24/2024 1255)  Anxieties, Fears or Concerns: pain control/med adjustment, food too hard  Individualized Care Needs: pain meds adjust frequency/give pain meds prn, meds crushed in pudding, safety w/mobility, adjust diet to specify chopped meats  Goal: Absence of Hospital-Acquired Illness or Injury  Outcome: Progressing  Intervention: Identify and Manage Fall Risk  Flowsheets (Taken 4/24/2024 1255)  Safety Promotion/Fall Prevention:   assistive device/personal item within reach   bed alarm set   medications reviewed   instructed to call staff for mobility   nonskid shoes/socks when out of bed   gait belt with ambulation  Intervention: Prevent Skin Injury  Flowsheets (Taken 4/24/2024 1255)  Body Position:   weight shifting   heels elevated   sitting up in bed  Skin Protection:   protective footwear used   skin sealant/moisture barrier applied  Device Skin Pressure Protection:   adhesive use limited   positioning supports utilized  Intervention: Prevent and Manage VTE (Venous Thromboembolism) Risk  Flowsheets (Taken 4/24/2024 1255)  VTE Prevention/Management:   ambulation promoted   bleeding precations maintained   bleeding risk assessed  Intervention: Prevent Infection  Flowsheets (Taken 4/24/2024 1255)  Infection Prevention:   hand hygiene promoted   single patient room provided  Goal: Optimal Comfort and Wellbeing  Outcome: Progressing  Intervention: Monitor Pain and Promote Comfort  Flowsheets (Taken 4/24/2024 1255)  Pain Management Interventions:   medication offered   position adjusted   pillow support provided   premedicated for activity   quiet environment facilitated   relaxation techniques promoted  Intervention: Provide Person-Centered Care  Flowsheets  (Taken 4/24/2024 1255)  Trust Relationship/Rapport:   care explained   choices provided   questions encouraged   questions answered   reassurance provided  Goal: Readiness for Transition of Care  Outcome: Progressing     Problem: Infection  Goal: Absence of Infection Signs and Symptoms  Outcome: Progressing  Intervention: Prevent or Manage Infection  Flowsheets (Taken 4/24/2024 1255)  Infection Management: aseptic technique maintained  Isolation Precautions: protective     Problem: Fall Injury Risk  Goal: Absence of Fall and Fall-Related Injury  Outcome: Progressing     Problem: Swallowing Impairment  Goal: Optimal Eating and Swallowing Without Aspiration  Outcome: Progressing  Intervention: Optimize Eating and Swallowing  Flowsheets (Taken 4/24/2024 1255)  Aspiration Precautions:   liquids thickened   respiratory status monitored   liquids/solids alternated   awake/alert before oral intake   upright posture maintained  Swallowing Interventions: Dysphagia:   foods moistened   food and liquid intake alternated   small bites/sips encouraged     Problem: Hypertension Comorbidity  Goal: Blood Pressure in Desired Range  Outcome: Progressing  Intervention: Maintain Blood Pressure Management  Flowsheets (Taken 4/24/2024 1255)  Medication Review/Management: medications reviewed     Problem: Osteoarthritis Comorbidity  Goal: Maintenance of Osteoarthritis Symptom Control  Outcome: Progressing     Problem: Pain Chronic (Persistent) (Comorbidity Management)  Goal: Acceptable Pain Control and Functional Ability  Outcome: Progressing  Intervention: Develop Pain Management Plan  Flowsheets (Taken 4/24/2024 1255)  Pain Management Interventions:   medication offered   position adjusted   pillow support provided   premedicated for activity   quiet environment facilitated   relaxation techniques promoted

## 2024-04-24 NOTE — PROGRESS NOTES
"[facility]  [facility] MEDICINE ~ PROGRESS NOTE    CHIEF COMPLAINT     Hospital follow up    HOSPITAL COURSE     Patient is 79 years old male with a past medical history of rheumatoid arthritis, osteoarthritis, osteopenia, hx of partial colon resection ("black spot on colon" - reportedly non cancerous), and newly diagnosed HTN who initially presented to [facility] s/p fall at home resulting in a right intertrochanteric femur fracture. Patient subsequently underwent IM nailing of right intertrochanteric femur fracture on 04/11/2024 with Dr. Tatum.  Patient was noted to have elevated blood pressure readings during admission therefore was initiated on lisinopril, currently receiving 40 mg daily. Patient was transferred to our swing unit for continued therapy.  On exam today, patient denies any complaints.  He does endorse an episode of choking while eating breakfast this morning but contributes this to the dry food.  Will obtain SLP eval prior to initiating diet.  At baseline, patient does use an assistive device to walk, mainly Rollator.  He also has a powered wheelchair, a walker, a ramp at home, and lives with his wife who does assist with ADLs as needed.     Today:  Oxycodone immediate release 5mg discontinued today as patient had no relief. He would like to continue Tylenol No. 3 for now. Patient reports he does not feel as if he is back to his baseline yet and would like more therapy. SLP to complete esophagram tomorrow morning.     OBJECTIVE/PHYSICAL EXAM     VITAL SIGNS (MOST RECENT):  Temp: 97.6 °F (36.4 °C) (04/24/24 0733)  Pulse: 91 (04/24/24 0733)  Resp: 18 (04/24/24 0910)  BP: 120/66 (04/24/24 0733)  SpO2: 98 % (04/24/24 0733) VITAL SIGNS (24 HOUR RANGE):  Temp:  [97.6 °F (36.4 °C)-98.3 °F (36.8 °C)] 97.6 °F (36.4 °C)  Pulse:  [] 91  Resp:  [17-18] 18  SpO2:  [97 %-98 %] 98 %  BP: (108-127)/(58-66) 120/66     GENERAL: In no acute distress, afebrile  HEENT:  Atraumatic, " normocephalic, moist mucous membranes  CHEST: Clear to auscultation bilaterally  HEART: S1, S2, no appreciable murmur  ABDOMEN: Soft, nontender, BS +  MSK: Warm, no lower extremity edema, RA changes throughout   NEUROLOGIC: Alert and oriented x4, moving all extremities   INTEGUMENTARY: Right hip incision without erythema or drainage, staples in place   PSYCHIATRY: Appropriate mood and affect     ASSESSMENT/PLAN     S/p fall at home resulting in a right intertrochanteric femur fracture  Underwent IM nailing of right intertrochanteric femur fracture on 04/11/2024 with Dr. Tatum  WBAT to RLE, full ROM   Lovenox for DVT prevention, and orthopedics recommends aspirin 81 mg b.i.d. upon discharge, but patient is allergic.  PT/OT  Follow-up with Dr. Tatum on 04/30/2024 at 0800  Pain control as needed - continue prn Tylenol No. 3, lidocaine patch    Dysphagia  MBS on 04/18/2024 - SLP recommended soft and bite size diet with gravy and mildly thick liquids  Esophagram planned for tomorrow, NPO after midnight      Post-op anemia   Stable     HTN   Reports newly diagnosed   Currently receiving Lisinopril 40mg QD     Rheumatoid arthritis, osteoarthritis  Continue home methylprednisone 4 mg daily - increased to 8mg QD x3 days   PPI during admission    Cough - resolved   Sputum culture revealing many gram negative rods with 3+ quality   Cipro PO x5 days      DVT prophylaxis: Lovenox     Anticipated discharge and disposition: Continue PT/OT  __________________________________________________________________________    LABS/MICRO/MEDS/DIAGNOSTICS     LABS  Recent Labs     04/23/24  0518      K 4.1   CHLORIDE 102   CO2 30   BUN 20.2   CREATININE 0.82   GLUCOSE 96   CALCIUM 9.1     Recent Labs     04/23/24  0518   WBC 7.74   RBC 3.46*   HCT 32.5*   MCV 93.9        MICROBIOLOGY  Microbiology Results (last 7 days)       Procedure Component Value Units Date/Time    Respiratory Culture [[id number]]  (Abnormal) Collected:  "04/22/24 1926    Order Status: Completed Specimen: Sputum, Induced Updated: 04/24/24 0722     Respiratory Culture Many Gram-negative Rods     Comment: with normal respiratory stuart        GRAM STAIN Quality 3+      Few Gram positive cocci      Rare Yeast             MEDICATIONS  Current Facility-Administered Medications   Medication Dose Route Frequency    calcium-vitamin D3  1 tablet Oral BID    enoxparin  40 mg Subcutaneous Daily    famotidine  20 mg Oral BID    LIDOcaine  1 patch Transdermal Q24H    LIDOcaine  1 patch Transdermal Q24H    lisinopriL  40 mg Oral Daily    methocarbamoL  750 mg Oral TID    senna-docusate 8.6-50 mg  2 tablet Oral BID         INFUSIONS  Current Facility-Administered Medications   Medication Dose Route Frequency Last Rate Last Admin        DIAGNOSTIC TESTS  X-Ray Abdomen Flat And Erect   Final Result      Moderate colonic fecal loading.  Nonspecific bowel gas pattern.         Electronically signed by: Orion Pettit   Date:    04/22/2024   Time:    15:05      Fl Modified Barium Swallow Speech   Final Result      FL Esophagram Complete    (Results Pending)        No results found for: "EF"     NUTRITION STATUS  Patient meets ASPEN criteria for moderate malnutrition of acute illness or injury per RD assessment as evidenced by:  Energy Intake (Malnutrition): less than 75% for greater than 7 days  Weight Loss (Malnutrition): other (see comments) (-5.97% in recent months. Reported 10# wt loss pt stated)  Subcutaneous Fat (Malnutrition): moderate depletion  Muscle Mass (Malnutrition): moderate depletion           A minimum of two characteristics is recommended for diagnosis of either severe or non-severe malnutrition.     Case related differential diagnoses have been reviewed; assessment and plan has been documented. I have personally reviewed the labs and test results that are currently available; I have reviewed the patients medication list. I have reviewed the consulting providers " recommendations. I have reviewed or attempted to review medical records based upon their availability.  All of the patient's and/or family's questions have been addressed and answered to the best of my ability.  I will continue to monitor closely and make adjustments to medical management as needed.  This document was created using M*Modal Fluency Direct.  Transcription errors may have been made.  Please contact me if any questions may rise regarding documentation to clarify transcription.      JEOY Brooks   Internal Medicine  Department of Hospital Medicine [facility]

## 2024-04-25 PROCEDURE — 97116 GAIT TRAINING THERAPY: CPT

## 2024-04-25 PROCEDURE — 25000003 PHARM REV CODE 250: Performed by: PHYSICIAN ASSISTANT

## 2024-04-25 PROCEDURE — 97530 THERAPEUTIC ACTIVITIES: CPT

## 2024-04-25 PROCEDURE — 63600175 PHARM REV CODE 636 W HCPCS: Performed by: PHYSICIAN ASSISTANT

## 2024-04-25 PROCEDURE — 92526 ORAL FUNCTION THERAPY: CPT

## 2024-04-25 PROCEDURE — 25000003 PHARM REV CODE 250: Performed by: STUDENT IN AN ORGANIZED HEALTH CARE EDUCATION/TRAINING PROGRAM

## 2024-04-25 PROCEDURE — 99900035 HC TECH TIME PER 15 MIN (STAT)

## 2024-04-25 PROCEDURE — 97110 THERAPEUTIC EXERCISES: CPT

## 2024-04-25 PROCEDURE — 25000003 PHARM REV CODE 250: Performed by: INTERNAL MEDICINE

## 2024-04-25 PROCEDURE — 11000004 HC SNF PRIVATE

## 2024-04-25 PROCEDURE — 94760 N-INVAS EAR/PLS OXIMETRY 1: CPT

## 2024-04-25 RX ORDER — PANTOPRAZOLE SODIUM 40 MG/1
40 TABLET, DELAYED RELEASE ORAL DAILY
Status: DISCONTINUED | OUTPATIENT
Start: 2024-04-25 | End: 2024-05-01 | Stop reason: HOSPADM

## 2024-04-25 RX ADMIN — ACETAMINOPHEN AND CODEINE PHOSPHATE 1 TABLET: 300; 30 TABLET ORAL at 12:04

## 2024-04-25 RX ADMIN — CIPROFLOXACIN HYDROCHLORIDE 500 MG: 500 TABLET, FILM COATED ORAL at 10:04

## 2024-04-25 RX ADMIN — ENOXAPARIN SODIUM 40 MG: 40 INJECTION SUBCUTANEOUS at 05:04

## 2024-04-25 RX ADMIN — LIDOCAINE 5% 1 PATCH: 700 PATCH TOPICAL at 10:04

## 2024-04-25 RX ADMIN — Medication 1 TABLET: at 09:04

## 2024-04-25 RX ADMIN — ACETAMINOPHEN AND CODEINE PHOSPHATE 1 TABLET: 300; 30 TABLET ORAL at 10:04

## 2024-04-25 RX ADMIN — CIPROFLOXACIN HYDROCHLORIDE 500 MG: 500 TABLET, FILM COATED ORAL at 09:04

## 2024-04-25 RX ADMIN — LISINOPRIL 40 MG: 20 TABLET ORAL at 10:04

## 2024-04-25 RX ADMIN — METHOCARBAMOL 750 MG: 750 TABLET ORAL at 10:04

## 2024-04-25 RX ADMIN — PANTOPRAZOLE SODIUM 40 MG: 40 TABLET, DELAYED RELEASE ORAL at 12:04

## 2024-04-25 RX ADMIN — POLYETHYLENE GLYCOL 3350 17 G: 17 POWDER, FOR SOLUTION ORAL at 03:04

## 2024-04-25 RX ADMIN — SENNOSIDES AND DOCUSATE SODIUM 2 TABLET: 8.6; 5 TABLET ORAL at 10:04

## 2024-04-25 RX ADMIN — ACETAMINOPHEN AND CODEINE PHOSPHATE 1 TABLET: 300; 30 TABLET ORAL at 09:04

## 2024-04-25 RX ADMIN — FAMOTIDINE 20 MG: 20 TABLET, FILM COATED ORAL at 10:04

## 2024-04-25 RX ADMIN — METHOCARBAMOL 750 MG: 750 TABLET ORAL at 09:04

## 2024-04-25 RX ADMIN — SENNOSIDES AND DOCUSATE SODIUM 2 TABLET: 8.6; 5 TABLET ORAL at 09:04

## 2024-04-25 RX ADMIN — Medication 1 TABLET: at 10:04

## 2024-04-25 RX ADMIN — METHOCARBAMOL 750 MG: 750 TABLET ORAL at 03:04

## 2024-04-25 NOTE — PT/OT/SLP PROGRESS
Occupational Therapy  Treatment    Name: Terence Tanner Jr.    : 1945 (79 y.o.)  MRN: [id number]           TREATMENT SUMMARY AND RECOMMENDATIONS:      OT Date of Treatment: 24  OT Start Time: 1032  OT Stop Time: 1055  OT Total Time (min): 23 min      Subjective Assessment:   No complaints  Lethargic   X Awake, alert, cooperative  Impulsive    Uncooperative   Flat affect    Agitated X c/o pain    Appropriate  c/o fatigue    Confused X Treated at bedside     Emotionally labile X Treated in gym/dept.      Other:        Therapy Precautions:    Cognitive deficits  Spinal precautions    Collar - hard  Sternal precautions    Collar - soft   TLSO   X Fall risk  LSO    Hip precautions - posterior  Knee immobilizer    Hip precautions - anterior X WBAT    Impaired communication  Partial weightbearing    Oxygen  TTWB    PEG tube  NWB    Visual deficits      Hearing deficits  X Other: ROMAT       Treatment Objectives:     Mobility Training:    Mobility task Assist level Comments    Bed mobility     Transfer     Sit to stands transitions CGA From chair level c BUE support   Functional mobility CGA Pt ambulated ~50ft c RW.    Sitting balance     Standing balance      Other:        ADL Training:    ADL Assist level Comments    Feeding     Grooming/hygiene     Bathing     Upper body dressing     Lower body dressing     Toileting SBA (-) void; pt seated in w/c attempted to urinate c use of urnial. Pt unwilling to attempt toileting on commode 2/2 pain.    Toilet transfer     Adaptive equipment training     Other:           Therapeutic Exercise:   Exercise Sets Reps Comments   UB strengthening 2 15 With 3# dowel, pt perf Shoulder press, chest press, straight arm raises, bicep curls, forward rows, backwards rows. Min length r/bs PRN.                          Assessment: Patient tolerated session fair. Pt continues to be limited by significant pain impacting FM; despite NSG premedicating pt. Pt would continue to benefit from  skilled OT services to improve pt's safety and independence with daily occupations, decrease caregiver burden, and reduce pt's risk of falls.     GOALS:   Multidisciplinary Problems       Occupational Therapy Goals          Problem: Occupational Therapy    Goal Priority Disciplines Outcome Interventions   Occupational Therapy Goal     OT, PT/OT Progressing    Description: Goals to be met by: 5/17/2024     Patient will increase functional independence with ADLs by performing:    LE Dressing with Stand-by Assistance.  Grooming while standing at sink with Stand-by Assistance.  Toileting from toilet with Stand-by Assistance for hygiene and clothing management.   Toilet transfer to toilet with Stand-by Assistance.                         Recommendations:     Discharge Equipment Recommendations:  none     Plan:     Patient to be seen  (5-6x/week (QD-BID)) to address the above listed problems via self-care/home management, neuromuscular re-education, therapeutic activities, therapeutic exercises  Plan of Care Expires: 05/17/24  Plan of Care Reviewed with: patient, spouse  Revisions made to plan of care: No      Skilled OT Minutes Provided: 23  Communication with Treatment Team:     Equipment recommendations:       At end of treatment, patient remained:   Up in chair     Up in wheelchair in room    In bed    With alarm activated    Bed rails up    Call bell in reach     Family/friends present    Restraints secured properly    In bathroom with CNA/RN notified   X In gym with PT/PTA/tech    Nurse aware    Other:

## 2024-04-25 NOTE — PT/OT/SLP PROGRESS
Physical Therapy Treatment Note           Name: Terence Tanner Jr.    : 1945 (79 y.o.)  MRN: [id number]           TREATMENT SUMMARY AND RECOMMENDATIONS:    PT Received On: 24  PT Start Time: 1105     PT Stop Time: 1130  PT Total Time (min): 25 min     Subjective Assessment:   No complaints  Lethargic   x Awake, alert, cooperative  Uncooperative   x Agitated x c/o pain    Appropriate x c/o fatigue    Confused  Treated at bedside     Emotionally labile x Treated in gym/dept.    Impulsive  Other:    Flat affect       Therapy Precautions:    Cognitive deficits  Spinal precautions    Collar - hard  Sternal precautions    Collar - soft   TLSO   x Fall risk  LSO    Hip precautions - posterior  Knee immobilizer    Hip precautions - anterior x WBAT    Impaired communication  Partial weightbearing    Oxygen  TTWB    PEG tube  NWB    Visual deficits  Other:currently NPO-awaiting ST assessment     Hearing deficits          Treatment Objectives:     Mobility Training:   Assist level Comments    Bed mobility SBA Sit>supine   Transfer SBA Stand pivot with and without AD   Gait CGA  X 85 feet using RW, following with WC   Sit to stand transitions SBA Multiple times throughout session    Sitting balance     Standing balance      Wheelchair mobility     Car transfer     Other:          Therapeutic Exercise:   Exercise Sets Reps Comments   Seated B LE EX  10 0# R LE, 2# L LE, hip flexion, knee ext and ankle PF/DF   Seated B LE bike using UBE  5' 2.5'F/2.5'B   Standing R LE EX  20 Marchjulio c, SLWENDY             Additional Comments:  Pt very fatigued following swallow study this AM, but still able to complete tx session. Sister was called regarding car transfers, she plans on coming either tomorrow or Monday at 1 pm. Ortho follow up  8 am - sister will be brining him. PT to progress as able.     Assessment: Patient tolerated session fair.     PT Plan: continue per POC  Revisions made to plan of care: No    GOALS:    Multidisciplinary Problems       Physical Therapy Goals          Problem: Physical Therapy    Goal Priority Disciplines Outcome Goal Variances Interventions   Physical Therapy Goal     PT, PT/OT Progressing     Description: Goals to be met by: Discharge      Patient will increase functional independence with mobility by performin. Sit to stand transfer with Modified Baker  2. Bed to chair transfer with Modified Baker using Rolling Walker  3. Gait  x at least 50 feet with Modified Baker and up to 175 feet with SBA using Rolling Walker.   4. Increased functional strength to 4/5 for R LE  5. Supine<>Sit with MOD I.                         Skilled PT Minutes Provided: 25   Communication with Treatment Team:     Equipment recommendations:       At end of treatment, patient remained:   Up in chair     Up in wheelchair in room   x In bed    With alarm activated   x Bed rails up   x Call bell in reach    x Family/friends present    Restraints secured properly    In bathroom with CNA/RN notified    Nurse aware    In gym with therapist/tech    Other:

## 2024-04-25 NOTE — PT/OT/SLP PROGRESS
Speech Language Pathology Treatment    Patient Name:  Terence Tanner Jr.   MRN:  [id number]  Admitting Diagnosis: Closed fracture of right hip    Recommendations:                 General Recommendations:  Dysphagia therapy  Diet recommendations:  Soft bite sized diet with gravy (IDDSI 6) with mildly thickened liquids (IDDSI 2) ,     Aspiration Precautions: 1 bite/sip at a time, Alternating bites/sips, Chin tuck, HOB to 90 degrees, and Meds crushed in puree   General Precautions: Standard,    Communication strategies:  go to room if call light pushed    Assessment:     Terence Tanner Jr. is a 79 y.o. male with an SLP diagnosis of Dysphagia.  He presents with moderate oropharyngeal dysphagia.    Subjective     Pt seen in bed, pleasant and cooperative.  Wife, son and daughter-in-law present for session.  Patient goals: Get leg better, stronger,  and return home     Pain/Comfort:       Respiratory Status: Room air    Objective:     Has the patient been evaluated by SLP for swallowing?   Yes   Keep patient NPO?     Current Respiratory Status:         Completed tongue base and pharyngeal exercises 5x/3. Tolerated mildly thickened liquids without s/s of aspiration.  Throat clearing present with meds/pudding,  Reviewed utilizing chin tuck to assist with clearing residue.  Provided written copy of 4 exercises to complete for swallowing.  All verbalized understanding and agreement.  Progressing towards short term goals.          Goals:   Multidisciplinary Problems       SLP Goals          Problem: SLP    Goal Priority Disciplines Outcome   SLP Goal     SLP    Description: Goals:   Long Term Goal:  1.  Tolerate least restrictive diet and liquids without s/s of aspiration  Short Term Goals:    1. Tolerate soft and bite sized diet with gravy (IDDSI 6) with mildly thick liquids  (IDDSI 2)  2. Complete tongue base and pharyngeal exercises 10x/5 sets  3. Verbalize and demonstrate swallowing strategies of chin tuck and alternating  solids/liquids  4. Recommendation of possible esophagram-GI consult.                         Plan:     Patient to be seen:  3-5x/week    Plan of Care expires:  5/18/24   Plan of Care reviewed with:   Patient and treatment team   SLP Follow-Up:  yes        Discharge recommendations:      Barriers to Discharge:  Level of Skilled Assistance Needed   and Safety Awareness      Time Tracking:     SLP Treatment Date: 4/25/24      Speech Start Time:   12:00  Speech Stop Time:   12:30  Speech Total Time (min):  30    Billable Minutes: Treatment Swallowing Dysfunction 30 04/25/2024

## 2024-04-25 NOTE — PT/OT/SLP PROGRESS
Occupational Therapy  Treatment    Name: Terence Tanner Jr.    : 1945 (79 y.o.)  MRN: [id number]           TREATMENT SUMMARY AND RECOMMENDATIONS:      OT Date of Treatment: 24  OT Start Time: 1305  OT Stop Time: 1330  OT Total Time (min): 25 min      Subjective Assessment:   No complaints  Lethargic   X Awake, alert, cooperative  Impulsive    Uncooperative   Flat affect    Agitated X c/o pain    Appropriate  c/o fatigue    Confused  Treated at bedside     Emotionally labile X Treated in gym/dept.      Other:        Therapy Precautions:    Cognitive deficits  Spinal precautions    Collar - hard  Sternal precautions    Collar - soft   TLSO   X Fall risk  LSO    Hip precautions - posterior  Knee immobilizer    Hip precautions - anterior X WBAT    Impaired communication  Partial weightbearing    Oxygen  TTWB    PEG tube  NWB    Visual deficits      Hearing deficits  X Other: ROMAT       Treatment Objectives:     Mobility Training:    Mobility task Assist level Comments    Bed mobility SBA Supine>EOB   Transfer SBA Stand step t/f c RW EOB>w/c   Sit to stands transitions     Functional mobility     Sitting balance     Standing balance      Other: W/c mobility SBA Pt self propelled w/c c BUE ~100ft.        Therapeutic Exercise:   Exercise Sets Reps Comments   UBE 2 5' F/B Mod resistance.                            Assessment: Patient tolerated session well. Pt continues to be limited by pain; however demonstrates good OOB mobility. OT and pt's son discussed current DME at home c OT recommending PWC vs. Mwc at home vs. Rollator to help c postural alignment and decrease pt's risk of 2/2 complications. Pt's son agreeable to OT recommendations. Pt would continue to benefit from skilled OT services to improve pt's safety and independence with daily occupations, decrease caregiver burden, and reduce pt's risk of falls.     GOALS:   Multidisciplinary Problems       Occupational Therapy Goals          Problem:  Occupational Therapy    Goal Priority Disciplines Outcome Interventions   Occupational Therapy Goal     OT, PT/OT Progressing    Description: Goals to be met by: 5/17/2024     Patient will increase functional independence with ADLs by performing:    LE Dressing with Stand-by Assistance.  Grooming while standing at sink with Stand-by Assistance.  Toileting from toilet with Stand-by Assistance for hygiene and clothing management.   Toilet transfer to toilet with Stand-by Assistance.                         Recommendations:     Discharge Equipment Recommendations:  none     Plan:     Patient to be seen  (5-6x/week (QD-BID)) to address the above listed problems via self-care/home management, neuromuscular re-education, therapeutic activities, therapeutic exercises  Plan of Care Expires: 05/17/24  Plan of Care Reviewed with: patient, spouse  Revisions made to plan of care: No      Skilled OT Minutes Provided: 25  Communication with Treatment Team:     Equipment recommendations:       At end of treatment, patient remained:   Up in chair     Up in wheelchair in room    In bed    With alarm activated    Bed rails up    Call bell in reach     Family/friends present    Restraints secured properly    In bathroom with CNA/RN notified   X In gym with PT/PTA/tech    Nurse aware    Other:

## 2024-04-25 NOTE — NURSING
Nurses Note -- 4 Eyes      4/24/2024   9:56 PM      Skin assessed during: Daily Assessment      [x] No Altered Skin Integrity Present    []Prevention Measures Documented      [] Yes- Altered Skin Integrity Present or Discovered   [] LDA Added if Not in Epic (Describe Wound)   [] New Altered Skin Integrity was Present on Admit and Documented in LDA   [] Wound Image Taken    Wound Care Consulted? No    Attending Nurse:  Jennifer Mcpherson RN/Staff Member:  KIRSTIN Russ

## 2024-04-25 NOTE — PROGRESS NOTES
"Inpatient Nutrition Assessment    Admit Date: 4/17/2024   Total duration of encounter: 8 days   Patient Age: 79 y.o.    Nutrition Recommendation/Prescription     Continue  mildly thick liquid: soft and bite sized gravy on meats. 2.Continue boost original 1 bottle BID provides 240 kcal and 10 gm of protein, per serving. 3. Monitor intake, wt, labs, medications    Communication of Recommendations: reviewed with provider and reviewed with patient    Nutrition Assessment     Malnutrition Assessment/Nutrition-Focused Physical Exam    Malnutrition Context: acute illness or injury (04/18/24 1455)  Malnutrition Level: moderate (04/18/24 1455)  Energy Intake (Malnutrition): less than 75% for greater than 7 days (04/18/24 1455)  Weight Loss (Malnutrition): other (see comments) (-5.97% in recent months. Reported 10# wt loss pt stated) (04/18/24 1455)  Subcutaneous Fat (Malnutrition): moderate depletion (04/18/24 1455)  Orbital Region (Subcutaneous Fat Loss): moderate depletion  Upper Arm Region (Subcutaneous Fat Loss): moderate depletion     Muscle Mass (Malnutrition): moderate depletion (04/18/24 1455)     Clavicle Bone Region (Muscle Loss): moderate depletion        Dorsal Hand (Muscle Loss): moderate depletion                    A minimum of two characteristics is recommended for diagnosis of either severe or non-severe malnutrition.    Chart Review    Reason Seen: continuous nutrition monitoring, length of stay, and follow-up    Malnutrition Screening Tool Results   Have you recently lost weight without trying?: Yes: 2-13 lbs  Have you been eating poorly because of a decreased appetite?: No   MST Score: 1   Diagnosis:  S/p fall at home resulting in a right intertrochanteric femur fracture   Post-op anemia   HTN  Dysphagia  Rheumatoid arthritis, osteoarthritis  Relevant Medical History: rheumatoid arthritis, osteoarthritis, osteopenia, hx of partial colon resection ("black spot on colon" - non cancerous     Scheduled " Medications:  calcium-vitamin D3, 1 tablet, BID  ciprofloxacin HCl, 500 mg, Q12H  enoxparin, 40 mg, Daily  LIDOcaine, 1 patch, Q24H  LIDOcaine, 1 patch, Q24H  lisinopriL, 40 mg, Daily  methocarbamoL, 750 mg, TID  pantoprazole, 40 mg, Daily  senna-docusate 8.6-50 mg, 2 tablet, BID    Continuous Infusions:   PRN Medications:   Current Facility-Administered Medications:     acetaminophen, 650 mg, Oral, Q4H PRN    acetaminophen-codeine 300-30mg, 1 tablet, Oral, Q2H PRN    albuterol-ipratropium, 3 mL, Nebulization, Q6H PRN    aluminum-magnesium hydroxide-simethicone, 30 mL, Oral, QID PRN    barium sulfate, 140 mL, Oral, ONCE PRN    benzonatate, 100 mg, Oral, TID PRN    bisacodyL, 10 mg, Rectal, Daily PRN    calcium carbonate, 500 mg, Oral, TID PRN    ez paste cream ba sulfate, 10 g, Oral, ONCE PRN    guaiFENesin 100 mg/5 ml, 200 mg, Oral, Q4H PRN    hydrALAZINE, 10 mg, Intravenous, Q4H PRN    ipratropium, 0.5 mg, Nebulization, QID PRN    labetalol, 10 mg, Intravenous, QID PRN    melatonin, 9 mg, Oral, Nightly PRN    naloxone, 0.02 mg, Intravenous, PRN    ondansetron, 8 mg, Oral, Q8H PRN    ondansetron, 4 mg, Intravenous, Q8H PRN    polyethylene glycol, 17 g, Oral, TID PRN    simethicone, 1 tablet, Oral, QID PRN    sodium chloride 0.9%, 10 mL, Intravenous, PRN    Calorie Containing IV Medications: no significant kcals from medications at this time    Recent Labs   Lab 04/23/24  0518      K 4.1   CALCIUM 9.1   CHLORIDE 102   CO2 30   BUN 20.2   CREATININE 0.82   EGFRNORACEVR >60   GLUCOSE 96   WBC 7.74   HGB 10.6*   HCT 32.5*     Nutrition Orders:  Diet Soft & Bite Sized (IDDSI Level 6) Mildly Thick Liquids (IDDSI Level 2)  Dietary nutrition supplements Boost Original Nutritional Drink - Chocolate; BID    Appetite/Oral Intake: good/100% of meals  Factors Affecting Nutritional Intake: chewing difficulty, decreased appetite, and difficulty/impaired swallowing  Social Needs Impacting Access to Food: none  "identified  Food/Jew/Cultural Preferences: none reported  Food Allergies: none reported  Last Bowel Movement: 04/24/24  Wound(s):      Comments  Per MD notes. Patient complained of difficulty swallowing upon admission, he was evaluated by speech therapy and needs to remain NPO until a barium swallow is performed.  Does affirm that he has been compensating for difficulty swallowing at home for many years and has self modified his diet to easy to chew foods.     04/26/24: Pt and family present. Pt intake is good, ate well at lunch time. Pt had esophagram today, noted. Pt still does like thickened liquids. Continue to encourage good PO intake and hydration.     04/22/24: Pt intake improved. Pt stated enjoying the eggs this am. LBM noted. Will monitor. Pt doesn't like thickened liquids. Encourage compliance with thickened liquids. Family present.     04/18/24: SLP recommended easy to chew, soft and bite sized gravy on meats mildly thick liquids. Delivered lunch tray to patient. Family present during rounds. Pt and family reported decrease intake over week. Pt has good appetite at home with access to meals. Pt follow easy to chew diet. Discussed diet change with family and patient. Pt on thickened liquids. Provide education on how to thickened to mildly thick liquids consistency with family. Discussed food preferences. Pt also report weight loss of 10#. Pt drinks boost at home. Provide recs to MD for addition nutrition since, decrease intake x one week.      04/17/24: Pt c/o of difficulty swallowing. Pt NPO until barium swallow. Per MD pt follow easy to chew foods at home. Will follow up with SLP on diet modifications. Will complete NFPE on next follow up.     Anthropometrics    Height: 5' 9" (175.3 cm),    Last Weight: 62.9 kg (138 lb 10.7 oz) (04/22/24 1416), Weight Method: Bed Scale  BMI (Calculated): 20.5  BMI Classification: normal (BMI 18.5-24.9)        Ideal Body Weight (IBW), Male: 160 lb     % Ideal Body " Weight, Male (lb): 86.67 %                 Usual Body Weight (UBW), k.4 kg  % Usual Body Weight: 94.23  % Weight Change From Usual Weight: -5.97 %  Usual Weight Provided By: patient    Wt Readings from Last 5 Encounters:   24 62.9 kg (138 lb 10.7 oz)   24 70.3 kg (155 lb)     Weight Change(s) Since Admission: -3.3 kg wt loss. Will  monitor intake  Wt Readings from Last 1 Encounters:   24 1416 62.9 kg (138 lb 10.7 oz)   24 0508 62.9 kg (138 lb 10.7 oz)   24 1452 66.2 kg (145 lb 15.1 oz)   24 1541 66.2 kg (145 lb 15.1 oz)   24 1100 66.2 kg (145 lb 15.1 oz)   Admit Weight: 66.2 kg (145 lb 15.1 oz) (24 1100), Weight Method: Bed Scale    Estimated Needs    Weight Used For Calorie Calculations: 62.9 kg (138 lb 10.7 oz)  Energy Calorie Requirements (kcal): 1,887 kcal (30 kcal/kg/CBW)  Energy Need Method: [place]-St Jeor  Weight Used For Protein Calculations: 62.9 kg (138 lb 10.7 oz)  Protein Requirements: 75.64 gm (1.2 gm/kg/CBW)  Fluid Requirements (mL): 1,887 mL (1 mL/kcal)        Enteral Nutrition     Patient not receiving enteral nutrition at this time.    Parenteral Nutrition     Patient not receiving parenteral nutrition support at this time.    Evaluation of Received Nutrient Intake    Calories: meeting estimated needs  Protein: meeting estimated needs    Patient Education     Not applicable.    Nutrition Diagnosis     PES: Swallowing difficulty related to oropharyngeal dysphagia as evidence bydecreased mastication, a-p transfer, delayed swallow reflex, pharyngeal residue after the swallow. -valleculae, pyriform sinus, top of airway.  Laryngeal penetration with thin liquids   . (progressing)     PES: Moderate acute disease or injury related malnutrition related to acute illness as evidenced by less than 75% needs met for greater than 7 days, moderate fat depletion, and moderate muscle depletion. (progressing)    Nutrition Interventions     Intervention(s):  general/healthful diet, modified composition of meals/snacks, commercial beverage, and collaboration with other providers    Goal: Consume % of meals/snacks by follow-up. (progressing)  Goal: Consume % of meals/snacks by follow-up. (progressing)    Nutrition Goals & Monitoring     Dietitian will monitor: food and beverage intake, weight, weight change, electrolyte/renal panel, glucose/endocrine profile, and gastrointestinal profile  Discharge planning:  easy to chew soft bite sized meats with extra gravy mildly thick liquids with boost supplements diet with texture modifications per SLP  Nutrition Risk/Follow-Up: moderate (follow-up in 3-5 days)   Please consult if re-assessment needed sooner.

## 2024-04-25 NOTE — PROGRESS NOTES
"[facility]  [facility] MEDICINE ~ PROGRESS NOTE    CHIEF COMPLAINT     Hospital follow up    HOSPITAL COURSE     Patient is 79 years old male with a past medical history of rheumatoid arthritis, osteoarthritis, osteopenia, hx of partial colon resection ("black spot on colon" - reportedly non cancerous), and newly diagnosed HTN who initially presented to [facility] s/p fall at home resulting in a right intertrochanteric femur fracture. Patient subsequently underwent IM nailing of right intertrochanteric femur fracture on 04/11/2024 with Dr. Tatum.  Patient was noted to have elevated blood pressure readings during admission therefore was initiated on lisinopril, currently receiving 40 mg daily. Patient was transferred to our swing unit for continued therapy.  On exam today, patient denies any complaints.  He does endorse an episode of choking while eating breakfast this morning but contributes this to the dry food.  Will obtain SLP eval prior to initiating diet.  At baseline, patient does use an assistive device to walk, mainly Rollator.  He also has a powered wheelchair, a walker, a ramp at home, and lives with his wife who does assist with ADLs as needed.     Today:  Patient complaints of dry mouth due to being NPO for esophagram. Esophagram revealed small sliding hiatal hernia with subtle Schatzki's ring.     OBJECTIVE/PHYSICAL EXAM     VITAL SIGNS (MOST RECENT):  Temp: 97.8 °F (36.6 °C) (04/25/24 0720)  Pulse: (!) 116 (04/25/24 1136)  Resp: 19 (04/25/24 1014)  BP: 116/69 (04/25/24 1136)  SpO2: 97 % (04/25/24 1136) VITAL SIGNS (24 HOUR RANGE):  Temp:  [97.8 °F (36.6 °C)-98.5 °F (36.9 °C)] 97.8 °F (36.6 °C)  Pulse:  [] 116  Resp:  [16-19] 19  SpO2:  [93 %-99 %] 97 %  BP: (112-165)/(52-69) 116/69     GENERAL: In no acute distress, afebrile  HEENT:  Atraumatic, normocephalic, moist mucous membranes  CHEST: Clear to auscultation bilaterally  HEART: S1, S2, no appreciable murmur  ABDOMEN: " Soft, nontender, BS +  MSK: Warm, no lower extremity edema, RA changes throughout   NEUROLOGIC: Alert and oriented x4, moving all extremities   INTEGUMENTARY: Right hip incision without erythema or drainage, staples in place   PSYCHIATRY: Appropriate mood and affect     ASSESSMENT/PLAN     S/p fall at home resulting in a right intertrochanteric femur fracture  Underwent IM nailing of right intertrochanteric femur fracture on 04/11/2024 with Dr. Tatum  WBAT to RLE, full ROM   Lovenox for DVT prevention, and orthopedics recommends aspirin 81 mg b.i.d. upon discharge, but patient is allergic.  PT/OT  Follow-up with Dr. Tatum on 04/30/2024 at 0800  Pain control as needed - continue prn Tylenol No. 3, lidocaine patch    Dysphagia  Small hiatal hernia with Schatzki's ring  MBS on 04/18/2024 - SLP recommended soft and bite size diet with gravy and mildly thick liquids  Esophagram revealed small sliding hiatal hernia with subtle Schatzki's ring   Protonix daily for 8 weeks     Post-op anemia   Stable     HTN   Reports newly diagnosed   Currently receiving Lisinopril 40mg QD     Rheumatoid arthritis, osteoarthritis  Continue home methylprednisone 4 mg daily - increased to 8mg QD x3 days   PPI during admission    Cough - resolved   Klebsiella pneumoniae and Klebsiella aerogenes sputum   Awaiting final sensitivity report  Cipro PO x5 days      DVT prophylaxis: Lovenox     Anticipated discharge and disposition: Continue PT/OT  __________________________________________________________________________    LABS/MICRO/MEDS/DIAGNOSTICS     LABS  Recent Labs     04/23/24  0518      K 4.1   CHLORIDE 102   CO2 30   BUN 20.2   CREATININE 0.82   GLUCOSE 96   CALCIUM 9.1     Recent Labs     04/23/24  0518   WBC 7.74   RBC 3.46*   HCT 32.5*   MCV 93.9        MICROBIOLOGY  Microbiology Results (last 7 days)       Procedure Component Value Units Date/Time    Respiratory Culture [[id number]]  (Abnormal) Collected: 04/22/24 1926  "   Order Status: Completed Specimen: Sputum, Induced Updated: 04/24/24 0722     Respiratory Culture Many Gram-negative Rods     Comment: with normal respiratory stuart        GRAM STAIN Quality 3+      Few Gram positive cocci      Rare Yeast             MEDICATIONS  Current Facility-Administered Medications   Medication Dose Route Frequency    calcium-vitamin D3  1 tablet Oral BID    ciprofloxacin HCl  500 mg Oral Q12H    enoxparin  40 mg Subcutaneous Daily    famotidine  20 mg Oral BID    LIDOcaine  1 patch Transdermal Q24H    LIDOcaine  1 patch Transdermal Q24H    lisinopriL  40 mg Oral Daily    methocarbamoL  750 mg Oral TID    senna-docusate 8.6-50 mg  2 tablet Oral BID         INFUSIONS  Current Facility-Administered Medications   Medication Dose Route Frequency Last Rate Last Admin        DIAGNOSTIC TESTS  FL Esophagram Complete   Final Result      Small sliding hiatal hernia with subtle Schatzki's ring         Electronically signed by: Trisha Lowery   Date:    04/25/2024   Time:    11:20      X-Ray Abdomen Flat And Erect   Final Result      Moderate colonic fecal loading.  Nonspecific bowel gas pattern.         Electronically signed by: Orion Pettit   Date:    04/22/2024   Time:    15:05      Fl Modified Barium Swallow Speech   Final Result           No results found for: "EF"     NUTRITION STATUS  Patient meets ASPEN criteria for moderate malnutrition of acute illness or injury per RD assessment as evidenced by:  Energy Intake (Malnutrition): less than 75% for greater than 7 days  Weight Loss (Malnutrition): other (see comments) (-5.97% in recent months. Reported 10# wt loss pt stated)  Subcutaneous Fat (Malnutrition): moderate depletion  Muscle Mass (Malnutrition): moderate depletion           A minimum of two characteristics is recommended for diagnosis of either severe or non-severe malnutrition.     Case related differential diagnoses have been reviewed; assessment and plan has been documented. I have " personally reviewed the labs and test results that are currently available; I have reviewed the patients medication list. I have reviewed the consulting providers recommendations. I have reviewed or attempted to review medical records based upon their availability.  All of the patient's and/or family's questions have been addressed and answered to the best of my ability.  I will continue to monitor closely and make adjustments to medical management as needed.  This document was created using [employer] Fluency Direct.  Transcription errors may have been made.  Please contact me if any questions may rise regarding documentation to clarify transcription.      JOEY Brooks   Internal Medicine  Department of [facility] Medicine  [facility]

## 2024-04-25 NOTE — PLAN OF CARE
Problem: Adult Inpatient Plan of Care  Goal: Plan of Care Review  Outcome: Progressing  Flowsheets (Taken 4/24/2024 1923)  Plan of Care Reviewed With:   patient   family  Goal: Patient-Specific Goal (Individualized)  Outcome: Progressing  Flowsheets (Taken 4/24/2024 1923)  Anxieties, Fears or Concerns: none express  Individualized Care Needs: pain meds, safety, thicken liquids  Goal: Absence of Hospital-Acquired Illness or Injury  Outcome: Progressing  Intervention: Identify and Manage Fall Risk  Flowsheets (Taken 4/24/2024 1923)  Safety Promotion/Fall Prevention:   assistive device/personal item within reach   bed alarm set   nonskid shoes/socks when out of bed   side rails raised x 3  Intervention: Prevent Skin Injury  Flowsheets (Taken 4/24/2024 1923)  Body Position: position changed independently  Skin Protection: incontinence pads utilized  Device Skin Pressure Protection:   absorbent pad utilized/changed   positioning supports utilized  Intervention: Prevent and Manage VTE (Venous Thromboembolism) Risk  Flowsheets (Taken 4/24/2024 1923)  VTE Prevention/Management:   ambulation promoted   bleeding precations maintained   fluids promoted  Intervention: Prevent Infection  Flowsheets (Taken 4/24/2024 1923)  Infection Prevention:   cohorting utilized   rest/sleep promoted   personal protective equipment utilized  Goal: Optimal Comfort and Wellbeing  Outcome: Progressing  Intervention: Monitor Pain and Promote Comfort  Flowsheets (Taken 4/24/2024 1923)  Pain Management Interventions:   care clustered   pain management plan reviewed with patient/caregiver   quiet environment facilitated  Intervention: Provide Person-Centered Care  Flowsheets (Taken 4/24/2024 1923)  Trust Relationship/Rapport:   care explained   choices provided   reassurance provided   questions answered  Goal: Readiness for Transition of Care  Outcome: Progressing  Intervention: Mutually Develop Transition Plan  Flowsheets (Taken 4/24/2024  1923)  Equipment Currently Used at Home:   bedside commode   walker, rolling  Transportation Anticipated: family or friend will provide  Who are your caregiver(s) and their phone number(s)?: Amelia (spouse) [phone]  Communicated LIZABETH with patient/caregiver: Date not available/Unable to determine  Do you expect to return to your current living situation?: Yes  Do you have help at home or someone to help you manage your care at home?: Yes  Readmission within 30 days?: No  Do you currently have service(s) that help you manage your care at home?: No  Is the pt/caregiver preference to resume services with current agency: No     Problem: Infection  Goal: Absence of Infection Signs and Symptoms  Outcome: Progressing  Intervention: Prevent or Manage Infection  Flowsheets (Taken 4/24/2024 1923)  Fever Reduction/Comfort Measures:   lightweight bedding   lightweight clothing  Infection Management: aseptic technique maintained  Isolation Precautions: precautions maintained     Problem: Fall Injury Risk  Goal: Absence of Fall and Fall-Related Injury  Outcome: Progressing  Intervention: Identify and Manage Contributors  Flowsheets (Taken 4/24/2024 1923)  Self-Care Promotion:   independence encouraged   BADL personal objects within reach  Medication Review/Management: medications reviewed  Intervention: Promote Injury-Free Environment  Flowsheets (Taken 4/24/2024 1923)  Safety Promotion/Fall Prevention:   assistive device/personal item within reach   bed alarm set   nonskid shoes/socks when out of bed   side rails raised x 3     Problem: Swallowing Impairment  Goal: Optimal Eating and Swallowing Without Aspiration  Outcome: Progressing  Intervention: Optimize Eating and Swallowing  Flowsheets (Taken 4/24/2024 1923)  Aspiration Precautions:   liquids thickened   liquids/solids alternated   awake/alert before oral intake   upright posture maintained  Swallowing Interventions: Dysphagia:   small bites/sips encouraged   foods  moistened  Swallowing Method: throat clear/extra swallow     Problem: Hypertension Comorbidity  Goal: Blood Pressure in Desired Range  Outcome: Progressing  Intervention: Maintain Blood Pressure Management  Flowsheets (Taken 4/24/2024 1923)  Syncope Management: legs elevated  Medication Review/Management: medications reviewed     Problem: Osteoarthritis Comorbidity  Goal: Maintenance of Osteoarthritis Symptom Control  Outcome: Progressing  Intervention: Maintain Osteoarthritis Symptom Control  Flowsheets (Taken 4/24/2024 1923)  Assistive Device Utilized:   walker   gait belt  Activity Management: Rolling - L1  Medication Review/Management: medications reviewed     Problem: Pain Chronic (Persistent) (Comorbidity Management)  Goal: Acceptable Pain Control and Functional Ability  Outcome: Progressing  Intervention: Develop Pain Management Plan  Flowsheets (Taken 4/24/2024 1923)  Pain Management Interventions:   care clustered   pain management plan reviewed with patient/caregiver   quiet environment facilitated  Intervention: Manage Persistent Pain  Flowsheets (Taken 4/24/2024 1923)  Sleep/Rest Enhancement:   awakenings minimized   regular sleep/rest pattern promoted  Bowel Elimination Promotion: adequate fluid intake promoted  Medication Review/Management: medications reviewed  Intervention: Optimize Psychosocial Wellbeing  Flowsheets (Taken 4/24/2024 1923)  Spiritual Activities Assistance: affirmation provided  Supportive Measures:   active listening utilized   relaxation techniques promoted  Diversional Activities: television  Family/Support System Care: support provided     Problem: Skin Injury Risk Increased  Goal: Skin Health and Integrity  Outcome: Progressing  Intervention: Optimize Skin Protection  Flowsheets (Taken 4/24/2024 1923)  Pressure Reduction Techniques: frequent weight shift encouraged  Pressure Reduction Devices: positioning supports utilized  Skin Protection: incontinence pads utilized  Activity  Management: Rolling - L1  Head of Bed (HOB) Positioning: HOB at 30-45 degrees  Intervention: Promote and Optimize Oral Intake  Flowsheets (Taken 4/24/2024 1923)  Oral Nutrition Promotion: adaptive equipment use encouraged  Nutrition Interventions: food preferences provided

## 2024-04-25 NOTE — PT/OT/SLP PROGRESS
Physical Therapy Treatment Note           Name: Terence Tanner Jr.    : 1945 (79 y.o.)  MRN: [id number]           TREATMENT SUMMARY AND RECOMMENDATIONS:    PT Received On: 24  PT Start Time: 1332     PT Stop Time: 1400  PT Total Time (min): 28 min     Subjective Assessment:   No complaints  Lethargic   x Awake, alert, cooperative  Uncooperative   x Agitated x c/o pain    Appropriate x c/o fatigue    Confused  Treated at bedside     Emotionally labile x Treated in gym/dept.    Impulsive  Other:    Flat affect       Therapy Precautions:    Cognitive deficits  Spinal precautions    Collar - hard  Sternal precautions    Collar - soft   TLSO   x Fall risk  LSO    Hip precautions - posterior  Knee immobilizer    Hip precautions - anterior x WBAT    Impaired communication  Partial weightbearing    Oxygen  TTWB    PEG tube  NWB    Visual deficits  Other:currently NPO-awaiting ST assessment     Hearing deficits          Treatment Objectives:     Mobility Training:   Assist level Comments    Bed mobility SBA Sit>supine   Transfer CGA/SBA Stand pivot with and without AD WC<>Toilet, WC>Bed   Gait CGA  X 55 feet using RW, following with WC   Sit to stand transitions CGA/SBA Multiple times throughout session    Sitting balance     Standing balance      Wheelchair mobility     Car transfer     Other:          Therapeutic Exercise:   Exercise Sets Reps Comments   R LE PROM in long sitting   To tolerance    STM to R quad/IT band  5'                    Additional Comments:  Pt without changes. Set up car transfers with family.  PT to progress as able.     Assessment: Patient tolerated session fair.     PT Plan: continue per POC  Revisions made to plan of care: No    GOALS:   Multidisciplinary Problems       Physical Therapy Goals          Problem: Physical Therapy    Goal Priority Disciplines Outcome Goal Variances Interventions   Physical Therapy Goal     PT, PT/OT Progressing     Description: Goals to be met by:  Discharge      Patient will increase functional independence with mobility by performin. Sit to stand transfer with Modified [place]  2. Bed to chair transfer with Modified [place] using Rolling Walker  3. Gait  x at least 50 feet with Modified [place] and up to 175 feet with SBA using Rolling Walker.   4. Increased functional strength to 4/5 for R LE  5. Supine<>Sit with MOD I.                         Skilled PT Minutes Provided: 25   Communication with Treatment Team:     Equipment recommendations:       At end of treatment, patient remained:   Up in chair     Up in wheelchair in room   x In bed    With alarm activated   x Bed rails up   x Call bell in reach    x Family/friends present    Restraints secured properly    In bathroom with CNA/RN notified    Nurse aware    In gym with therapist/tech    Other:

## 2024-04-25 NOTE — PLAN OF CARE
Problem: Adult Inpatient Plan of Care  Goal: Plan of Care Review  Outcome: Progressing  Flowsheets (Taken 4/25/2024 1113)  Plan of Care Reviewed With:   patient   spouse  Goal: Patient-Specific Goal (Individualized)  Outcome: Progressing  Flowsheets (Taken 4/25/2024 1113)  Anxieties, Fears or Concerns: mouth was dry from being NPO prior to esophagram  Individualized Care Needs: esophagram this morning, meds crushed in pudding/thickened liquids, wound care, pain management  Goal: Absence of Hospital-Acquired Illness or Injury  Outcome: Progressing  Intervention: Identify and Manage Fall Risk  Flowsheets (Taken 4/25/2024 1113)  Safety Promotion/Fall Prevention:   assistive device/personal item within reach   bed alarm set   gait belt with ambulation   nonskid shoes/socks when out of bed   instructed to call staff for mobility  Intervention: Prevent Skin Injury  Flowsheets (Taken 4/25/2024 1113)  Body Position:   heels elevated   weight shifting   sitting up in bed  Skin Protection: protective footwear used  Device Skin Pressure Protection:   adhesive use limited   positioning supports utilized  Intervention: Prevent and Manage VTE (Venous Thromboembolism) Risk  Flowsheets (Taken 4/25/2024 1113)  VTE Prevention/Management:   ambulation promoted   bleeding precations maintained   bleeding risk assessed   dorsiflexion/plantar flexion performed  Intervention: Prevent Infection  Flowsheets (Taken 4/25/2024 1113)  Infection Prevention:   hand hygiene promoted   single patient room provided  Goal: Optimal Comfort and Wellbeing  Outcome: Progressing  Intervention: Monitor Pain and Promote Comfort  Flowsheets (Taken 4/25/2024 1113)  Pain Management Interventions:   medication offered   pillow support provided   pain management plan reviewed with patient/caregiver   position adjusted   premedicated for activity   quiet environment facilitated   relaxation techniques promoted  Intervention: Provide Person-Centered  Care  Flowsheets (Taken 4/25/2024 1113)  Trust Relationship/Rapport:   care explained   choices provided   questions encouraged   reassurance provided  Goal: Readiness for Transition of Care  Outcome: Progressing     Problem: Infection  Goal: Absence of Infection Signs and Symptoms  Outcome: Progressing  Intervention: Prevent or Manage Infection  Flowsheets (Taken 4/25/2024 1113)  Infection Management: aseptic technique maintained  Isolation Precautions: contact     Problem: Fall Injury Risk  Goal: Absence of Fall and Fall-Related Injury  Outcome: Progressing  Intervention: Identify and Manage Contributors  Flowsheets (Taken 4/25/2024 1113)  Self-Care Promotion:   independence encouraged   BADL personal objects within reach  Medication Review/Management: medications reviewed  Intervention: Promote Injury-Free Environment  Flowsheets (Taken 4/25/2024 1113)  Safety Promotion/Fall Prevention:   assistive device/personal item within reach   bed alarm set   gait belt with ambulation   nonskid shoes/socks when out of bed   instructed to call staff for mobility     Problem: Swallowing Impairment  Goal: Optimal Eating and Swallowing Without Aspiration  Outcome: Progressing     Problem: Hypertension Comorbidity  Goal: Blood Pressure in Desired Range  Outcome: Progressing     Problem: Osteoarthritis Comorbidity  Goal: Maintenance of Osteoarthritis Symptom Control  Outcome: Progressing     Problem: Pain Chronic (Persistent) (Comorbidity Management)  Goal: Acceptable Pain Control and Functional Ability  Outcome: Progressing     Problem: Skin Injury Risk Increased  Goal: Skin Health and Integrity  Outcome: Progressing

## 2024-04-26 LAB
BACTERIA SPEC CULT: ABNORMAL
BACTERIA SPEC CULT: ABNORMAL
FERRITIN SERPL-MCNC: 593.1 NG/ML (ref 21.81–274.66)
FOLATE SERPL-MCNC: 7.7 NG/ML (ref 7–31.4)
GRAM STN SPEC: ABNORMAL
IRON SATN MFR SERPL: 23 % (ref 20–50)
IRON SERPL-MCNC: 51 UG/DL (ref 65–175)
TIBC SERPL-MCNC: 174 UG/DL (ref 69–240)
TIBC SERPL-MCNC: 225 UG/DL (ref 250–450)
TRANSFERRIN SERPL-MCNC: 191 MG/DL (ref 163–344)
VIT B12 SERPL-MCNC: 370 PG/ML (ref 213–816)

## 2024-04-26 PROCEDURE — 83540 ASSAY OF IRON: CPT | Performed by: STUDENT IN AN ORGANIZED HEALTH CARE EDUCATION/TRAINING PROGRAM

## 2024-04-26 PROCEDURE — 25000003 PHARM REV CODE 250: Performed by: INTERNAL MEDICINE

## 2024-04-26 PROCEDURE — 94760 N-INVAS EAR/PLS OXIMETRY 1: CPT

## 2024-04-26 PROCEDURE — 99900035 HC TECH TIME PER 15 MIN (STAT)

## 2024-04-26 PROCEDURE — 97530 THERAPEUTIC ACTIVITIES: CPT

## 2024-04-26 PROCEDURE — 97110 THERAPEUTIC EXERCISES: CPT | Mod: CQ

## 2024-04-26 PROCEDURE — 82746 ASSAY OF FOLIC ACID SERUM: CPT | Performed by: STUDENT IN AN ORGANIZED HEALTH CARE EDUCATION/TRAINING PROGRAM

## 2024-04-26 PROCEDURE — 82728 ASSAY OF FERRITIN: CPT | Performed by: PHYSICIAN ASSISTANT

## 2024-04-26 PROCEDURE — 97116 GAIT TRAINING THERAPY: CPT | Mod: CQ

## 2024-04-26 PROCEDURE — 82607 VITAMIN B-12: CPT | Performed by: STUDENT IN AN ORGANIZED HEALTH CARE EDUCATION/TRAINING PROGRAM

## 2024-04-26 PROCEDURE — 63600175 PHARM REV CODE 636 W HCPCS: Performed by: PHYSICIAN ASSISTANT

## 2024-04-26 PROCEDURE — 25000003 PHARM REV CODE 250: Performed by: PHYSICIAN ASSISTANT

## 2024-04-26 PROCEDURE — 97110 THERAPEUTIC EXERCISES: CPT

## 2024-04-26 PROCEDURE — 11000004 HC SNF PRIVATE

## 2024-04-26 PROCEDURE — 36415 COLL VENOUS BLD VENIPUNCTURE: CPT | Performed by: STUDENT IN AN ORGANIZED HEALTH CARE EDUCATION/TRAINING PROGRAM

## 2024-04-26 RX ADMIN — CIPROFLOXACIN HYDROCHLORIDE 500 MG: 500 TABLET, FILM COATED ORAL at 08:04

## 2024-04-26 RX ADMIN — ACETAMINOPHEN AND CODEINE PHOSPHATE 1 TABLET: 300; 30 TABLET ORAL at 03:04

## 2024-04-26 RX ADMIN — SENNOSIDES AND DOCUSATE SODIUM 2 TABLET: 8.6; 5 TABLET ORAL at 08:04

## 2024-04-26 RX ADMIN — LIDOCAINE 5% 1 PATCH: 700 PATCH TOPICAL at 08:04

## 2024-04-26 RX ADMIN — METHOCARBAMOL 750 MG: 750 TABLET ORAL at 08:04

## 2024-04-26 RX ADMIN — PANTOPRAZOLE SODIUM 40 MG: 40 TABLET, DELAYED RELEASE ORAL at 08:04

## 2024-04-26 RX ADMIN — LISINOPRIL 40 MG: 20 TABLET ORAL at 08:04

## 2024-04-26 RX ADMIN — Medication 1 TABLET: at 08:04

## 2024-04-26 RX ADMIN — ACETAMINOPHEN AND CODEINE PHOSPHATE 1 TABLET: 300; 30 TABLET ORAL at 08:04

## 2024-04-26 RX ADMIN — ENOXAPARIN SODIUM 40 MG: 40 INJECTION SUBCUTANEOUS at 05:04

## 2024-04-26 RX ADMIN — METHOCARBAMOL 750 MG: 750 TABLET ORAL at 03:04

## 2024-04-26 NOTE — PROGRESS NOTES
"[facility]  [facility] MEDICINE ~ PROGRESS NOTE    CHIEF COMPLAINT     Hospital follow up    HOSPITAL COURSE     Patient is 79 years old male with a past medical history of rheumatoid arthritis, osteoarthritis, osteopenia, hx of partial colon resection ("black spot on colon" - reportedly non cancerous), and newly diagnosed HTN who initially presented to [facility] s/p fall at home resulting in a right intertrochanteric femur fracture. Patient subsequently underwent IM nailing of right intertrochanteric femur fracture on 04/11/2024 with Dr. Tatum.  Patient was noted to have elevated blood pressure readings during admission therefore was initiated on lisinopril, currently receiving 40 mg daily. Patient was transferred to our swing unit for continued therapy.  On exam today, patient denies any complaints.  He does endorse an episode of choking while eating breakfast this morning but contributes this to the dry food.  Will obtain SLP eval prior to initiating diet.  At baseline, patient does use an assistive device to walk, mainly Rollator.  He also has a powered wheelchair, a walker, a ramp at home, and lives with his wife who does assist with ADLs as needed.     Today:  No reported complaints today. CGA/SBA with therapy.     OBJECTIVE/PHYSICAL EXAM     VITAL SIGNS (MOST RECENT):  Temp: 97.4 °F (36.3 °C) (04/26/24 0745)  Pulse: 90 (04/26/24 0745)  Resp: 18 (04/26/24 0835)  BP: 125/70 (04/26/24 0745)  SpO2: 99 % (04/26/24 0745) VITAL SIGNS (24 HOUR RANGE):  Temp:  [97.4 °F (36.3 °C)-98.1 °F (36.7 °C)] 97.4 °F (36.3 °C)  Pulse:  [72-90] 90  Resp:  [18] 18  SpO2:  [98 %-99 %] 99 %  BP: (125-130)/(64-70) 125/70     GENERAL: In no acute distress, afebrile  HEENT:  Atraumatic, normocephalic, moist mucous membranes  CHEST: Clear to auscultation bilaterally  HEART: S1, S2, no appreciable murmur  ABDOMEN: Soft, nontender, BS +  MSK: Warm, no lower extremity edema, RA changes throughout   NEUROLOGIC: " "Alert and oriented x4, moving all extremities   INTEGUMENTARY: Right hip incision without erythema or drainage, staples in place   PSYCHIATRY: Appropriate mood and affect     ASSESSMENT/PLAN     S/p fall at home resulting in a right intertrochanteric femur fracture  Underwent IM nailing of right intertrochanteric femur fracture on 04/11/2024 with Dr. Tatum  WBAT to RLE, full ROM   Lovenox for DVT prevention, and orthopedics recommends aspirin 81 mg b.i.d. upon discharge, but patient is allergic.  PT/OT  Follow-up with Dr. Tatum on 04/30/2024 at 0800  Pain control as needed - continue prn Tylenol No. 3, lidocaine patch    Dysphagia  Small hiatal hernia with Schatzki's ring  MBS on 04/18/2024 - SLP recommended soft and bite size diet with gravy and mildly thick liquids  Esophagram revealed small sliding hiatal hernia with subtle Schatzki's ring   Protonix daily for 8 weeks     Post-op anemia   Stable     HTN   Reports newly diagnosed   Currently receiving Lisinopril 40mg QD     Rheumatoid arthritis, osteoarthritis  Continue home methylprednisone 4 mg daily - increased to 8mg QD x3 days without much relief   PPI during admission    Cough - resolved   Klebsiella pneumoniae and Klebsiella aerogenes sputum   Awaiting final sensitivity report  Cipro PO x5 days      DVT prophylaxis: Lovenox     Anticipated discharge and disposition: Continue PT/OT  __________________________________________________________________________    LABS/MICRO/MEDS/DIAGNOSTICS     LABS  No results for input(s): "NA", "K", "CHLORIDE", "CO2", "BUN", "CREATININE", "GLUCOSE", "CALCIUM", "ALKPHOS", "AST", "ALT", "ALBUMIN" in the last 72 hours.    No results for input(s): "WBC", "RBC", "HCT", "MCV", "PLT" in the last 72 hours.    Invalid input(s): "HG"    MICROBIOLOGY  Microbiology Results (last 7 days)       Procedure Component Value Units Date/Time    Respiratory Culture [[id number]]  (Abnormal)  (Susceptibility) Collected: 04/22/24 1926    Order " "Status: Completed Specimen: Sputum, Induced Updated: 04/26/24 1043     Respiratory Culture Many Klebsiella pneumoniae      Many KLEBSIELLA AEROGENES     Comment: with normal respiratory stuart        GRAM STAIN Quality 3+      Few Gram positive cocci      Rare Yeast             MEDICATIONS  Current Facility-Administered Medications   Medication Dose Route Frequency    calcium-vitamin D3  1 tablet Oral BID    ciprofloxacin HCl  500 mg Oral Q12H    enoxparin  40 mg Subcutaneous Daily    LIDOcaine  1 patch Transdermal Q24H    LIDOcaine  1 patch Transdermal Q24H    lisinopriL  40 mg Oral Daily    methocarbamoL  750 mg Oral TID    pantoprazole  40 mg Oral Daily    senna-docusate 8.6-50 mg  2 tablet Oral BID         INFUSIONS  Current Facility-Administered Medications   Medication Dose Route Frequency Last Rate Last Admin        DIAGNOSTIC TESTS  FL Esophagram Complete   Final Result      Small sliding hiatal hernia with subtle Schatzki's ring         Electronically signed by: Trisha Lowery   Date:    04/25/2024   Time:    11:20      X-Ray Abdomen Flat And Erect   Final Result      Moderate colonic fecal loading.  Nonspecific bowel gas pattern.         Electronically signed by: Orion Pettit   Date:    04/22/2024   Time:    15:05      Fl Modified Barium Swallow Speech   Final Result         No results found for: "EF"     NUTRITION STATUS  Patient meets ASPEN criteria for moderate malnutrition of acute illness or injury per RD assessment as evidenced by:  Energy Intake (Malnutrition): less than 75% for greater than 7 days  Weight Loss (Malnutrition): other (see comments) (-5.97% in recent months. Reported 10# wt loss pt stated)  Subcutaneous Fat (Malnutrition): moderate depletion  Muscle Mass (Malnutrition): moderate depletion           A minimum of two characteristics is recommended for diagnosis of either severe or non-severe malnutrition.     Case related differential diagnoses have been reviewed; assessment and plan " has been documented. I have personally reviewed the labs and test results that are currently available; I have reviewed the patients medication list. I have reviewed the consulting providers recommendations. I have reviewed or attempted to review medical records based upon their availability.  All of the patient's and/or family's questions have been addressed and answered to the best of my ability.  I will continue to monitor closely and make adjustments to medical management as needed.  This document was created using M*Modal Fluency Direct.  Transcription errors may have been made.  Please contact me if any questions may rise regarding documentation to clarify transcription.      JOEY Brooks   Internal Medicine  Department of Hospital Medicine  [facility]

## 2024-04-26 NOTE — PLAN OF CARE
Weekly Staffing Report      Date Admitted: 4/17/2024 :   Staffing Date: 4/26/2024     Patient Active Problem List   Diagnosis    Closed fracture of right hip s/p repair    Hypertension          Team Members Present:       Nursing Present     Yes [x]    No []    Physical Therapy Present    Yes [x]    No []    Occupational Therapy Present     Yes [x]    No []    Speech Therapy Present    Yes []    No []    NA [x]    Dietary Present    Yes [x]    No []        Physician Present     [] Thairy Reyes    [x] Indira Galdamez    [x] JOEY Hilliard    []       Family Present    [] Adult Children    [x] Spouse    [] POA    [] Friend/ Caregiver    [] Other       Interdisciplinary Meeting Notes:  PT- doing well. OT- eating ind/ groom SBA. Bathing Min A, upper body setup, lower body Min A, toileting CGA, limited by arthritis pain. ST- working on improving swallowing. Planned esophagram 04/5/2024. Appetite- working to improve. Medically- not as independent as he was previously. Pain limiting progress. Plan to discharge home with home health when ready. NRD 04/29/2024. All questions answered at this time                Please see Documented PT/OT/ST, Dietary, Nursing, and Physician notes for detailed treatment information.

## 2024-04-26 NOTE — PLAN OF CARE
[facility]  Discharge Reassessment    Primary Care Provider: No, Primary Doctor    Expected Discharge Date: 4/30/2024    Reassessment (most recent)       Discharge Reassessment - 04/26/24 1405          Discharge Reassessment    Assessment Type Discharge Planning Reassessment     Did the patient's condition or plan change since previous assessment? No     Discharge Plan discussed with: Spouse/sig other     Name(s) and Number(s) Amelia spouse [phone]     Communicated LIZABETH with patient/caregiver Date not available/Unable to determine     Discharge Plan A Home with family;Home Health     DME Needed Upon Discharge  none     Transition of Care Barriers None     Why the patient remains in the hospital Requires continued medical care        Post-Acute Status    Post-Acute Authorization Home Health     Home Health Status Pending medical clearance/testing     Hospital Resources/Appts/Education Provided Provided patient/caregiver with written discharge plan information     Discharge Delays None known at this time

## 2024-04-26 NOTE — PLAN OF CARE
Problem: Adult Inpatient Plan of Care  Goal: Plan of Care Review  Outcome: Progressing  Flowsheets (Taken 4/26/2024 1605)  Plan of Care Reviewed With:   patient   family  Goal: Patient-Specific Goal (Individualized)  Outcome: Progressing  Flowsheets (Taken 4/26/2024 1605)  Anxieties, Fears or Concerns: pain management  Individualized Care Needs: pain management, promote safety with mobility, meds crushed in pudding  Goal: Absence of Hospital-Acquired Illness or Injury  Outcome: Progressing  Intervention: Identify and Manage Fall Risk  Flowsheets (Taken 4/26/2024 1605)  Safety Promotion/Fall Prevention:   assistive device/personal item within reach   bed alarm set   family to remain at bedside   instructed to call staff for mobility   side rails raised x 3   nonskid shoes/socks when out of bed  Intervention: Prevent Skin Injury  Flowsheets (Taken 4/26/2024 1605)  Body Position: sitting up in bed  Skin Protection: incontinence pads utilized  Device Skin Pressure Protection:   absorbent pad utilized/changed   positioning supports utilized   tubing/devices free from skin contact  Intervention: Prevent and Manage VTE (Venous Thromboembolism) Risk  Flowsheets (Taken 4/26/2024 1605)  VTE Prevention/Management:   ambulation promoted   bleeding precations maintained   fluids promoted  Intervention: Prevent Infection  Flowsheets (Taken 4/26/2024 1605)  Infection Prevention:   cohorting utilized   environmental surveillance performed   hand hygiene promoted  Goal: Optimal Comfort and Wellbeing  Outcome: Progressing  Intervention: Monitor Pain and Promote Comfort  Flowsheets (Taken 4/26/2024 1605)  Pain Management Interventions:   care clustered   relaxation techniques promoted   quiet environment facilitated  Intervention: Provide Person-Centered Care  Flowsheets (Taken 4/26/2024 1605)  Trust Relationship/Rapport:   care explained   choices provided   questions answered   questions encouraged  Goal: Readiness for Transition  of Care  Outcome: Progressing  Intervention: Mutually Develop Transition Plan  Flowsheets (Taken 4/26/2024 1605)  Communicated LIZABETH with patient/caregiver: Date not available/Unable to determine     Problem: Infection  Goal: Absence of Infection Signs and Symptoms  Outcome: Progressing  Intervention: Prevent or Manage Infection  Flowsheets (Taken 4/26/2024 1605)  Fever Reduction/Comfort Measures:   lightweight bedding   lightweight clothing  Infection Management: aseptic technique maintained     Problem: Fall Injury Risk  Goal: Absence of Fall and Fall-Related Injury  Outcome: Progressing  Intervention: Identify and Manage Contributors  Flowsheets (Taken 4/26/2024 1605)  Self-Care Promotion:   independence encouraged   BADL personal objects within reach  Medication Review/Management: medications reviewed  Intervention: Promote Injury-Free Environment  Flowsheets (Taken 4/26/2024 1605)  Safety Promotion/Fall Prevention:   assistive device/personal item within reach   bed alarm set   family to remain at bedside   instructed to call staff for mobility   side rails raised x 3   nonskid shoes/socks when out of bed     Problem: Swallowing Impairment  Goal: Optimal Eating and Swallowing Without Aspiration  Outcome: Progressing  Intervention: Optimize Eating and Swallowing  Flowsheets (Taken 4/26/2024 1605)  Aspiration Precautions:   awake/alert before oral intake   upright posture maintained  Swallowing Interventions: Dysphagia:   food and liquid intake alternated   small bites/sips encouraged  Swallowing Method: throat clear/extra swallow     Problem: Hypertension Comorbidity  Goal: Blood Pressure in Desired Range  Outcome: Progressing  Intervention: Maintain Blood Pressure Management  Flowsheets (Taken 4/26/2024 1605)  Medication Review/Management: medications reviewed     Problem: Osteoarthritis Comorbidity  Goal: Maintenance of Osteoarthritis Symptom Control  Outcome: Progressing  Intervention: Maintain Osteoarthritis  Symptom Control  Flowsheets (Taken 4/26/2024 1605)  Assistive Device Utilized:   gait belt   walker  Activity Management: Ambulated to bathroom - L4  Medication Review/Management: medications reviewed     Problem: Pain Chronic (Persistent) (Comorbidity Management)  Goal: Acceptable Pain Control and Functional Ability  Outcome: Progressing  Intervention: Develop Pain Management Plan  Flowsheets (Taken 4/26/2024 1605)  Pain Management Interventions:   care clustered   relaxation techniques promoted   quiet environment facilitated  Intervention: Manage Persistent Pain  Flowsheets (Taken 4/26/2024 1605)  Sleep/Rest Enhancement:   awakenings minimized   relaxation techniques promoted   regular sleep/rest pattern promoted  Bowel Elimination Promotion: adequate fluid intake promoted  Medication Review/Management: medications reviewed  Intervention: Optimize Psychosocial Wellbeing  Flowsheets (Taken 4/26/2024 1605)  Supportive Measures:   active listening utilized   verbalization of feelings encouraged   self-care encouraged  Diversional Activities: television     Problem: Skin Injury Risk Increased  Goal: Skin Health and Integrity  Outcome: Progressing  Intervention: Optimize Skin Protection  Flowsheets (Taken 4/26/2024 1605)  Pressure Reduction Techniques: frequent weight shift encouraged  Pressure Reduction Devices: positioning supports utilized  Skin Protection: incontinence pads utilized  Activity Management: Ambulated to bathroom - L4  Head of Bed (HOB) Positioning: HOB at 30-45 degrees  Intervention: Promote and Optimize Oral Intake  Flowsheets (Taken 4/26/2024 1605)  Oral Nutrition Promotion:   calorie-dense foods provided   calorie-dense liquids provided  Nutrition Interventions: food preferences provided

## 2024-04-26 NOTE — PLAN OF CARE
Problem: Adult Inpatient Plan of Care  Goal: Plan of Care Review  Outcome: Progressing  Flowsheets (Taken 4/25/2024 2305)  Plan of Care Reviewed With: patient  Goal: Patient-Specific Goal (Individualized)  Outcome: Progressing  Flowsheets (Taken 4/25/2024 2305)  Anxieties, Fears or Concerns: none expressed at this time  Individualized Care Needs: safety, pain mgt, meds crushed in pudding  Goal: Absence of Hospital-Acquired Illness or Injury  Outcome: Progressing  Intervention: Identify and Manage Fall Risk  Flowsheets (Taken 4/25/2024 2305)  Safety Promotion/Fall Prevention:   assistive device/personal item within reach   bed alarm set   nonskid shoes/socks when out of bed   side rails raised x 3  Intervention: Prevent Skin Injury  Flowsheets (Taken 4/25/2024 2305)  Body Position: position changed independently  Skin Protection: protective footwear used  Device Skin Pressure Protection: absorbent pad utilized/changed  Intervention: Prevent and Manage VTE (Venous Thromboembolism) Risk  Flowsheets (Taken 4/25/2024 2305)  VTE Prevention/Management: ambulation promoted  Intervention: Prevent Infection  Flowsheets (Taken 4/25/2024 2305)  Infection Prevention:   cohorting utilized   personal protective equipment utilized   rest/sleep promoted  Goal: Optimal Comfort and Wellbeing  Outcome: Progressing  Intervention: Monitor Pain and Promote Comfort  Flowsheets (Taken 4/25/2024 2305)  Pain Management Interventions:   pain management plan reviewed with patient/caregiver   position adjusted   quiet environment facilitated   relaxation techniques promoted  Intervention: Provide Person-Centered Care  Flowsheets (Taken 4/25/2024 2305)  Trust Relationship/Rapport:   care explained   choices provided   questions encouraged   reassurance provided   thoughts/feelings acknowledged  Goal: Readiness for Transition of Care  Outcome: Progressing  Intervention: Mutually Develop Transition Plan  Flowsheets (Taken 4/25/2024  2305)  Equipment Currently Used at Home:   bedside commode   walker, rolling  Transportation Anticipated: family or friend will provide  Who are your caregiver(s) and their phone number(s)?: [phone] Amelia (spouse)  Communicated LIZABETH with patient/caregiver: Date not available/Unable to determine  Do you expect to return to your current living situation?: Yes  Do you have help at home or someone to help you manage your care at home?: Yes  Readmission within 30 days?: No  Do you currently have service(s) that help you manage your care at home?: No     Problem: Infection  Goal: Absence of Infection Signs and Symptoms  Outcome: Progressing  Intervention: Prevent or Manage Infection  Flowsheets (Taken 4/25/2024 2305)  Fever Reduction/Comfort Measures:   lightweight bedding   lightweight clothing  Infection Management: aseptic technique maintained  Isolation Precautions: protective     Problem: Fall Injury Risk  Goal: Absence of Fall and Fall-Related Injury  Outcome: Progressing  Intervention: Identify and Manage Contributors  Flowsheets (Taken 4/25/2024 2305)  Self-Care Promotion:   independence encouraged   BADL personal objects within reach  Medication Review/Management: medications reviewed  Intervention: Promote Injury-Free Environment  Flowsheets (Taken 4/25/2024 2305)  Safety Promotion/Fall Prevention:   assistive device/personal item within reach   bed alarm set   nonskid shoes/socks when out of bed   side rails raised x 3     Problem: Swallowing Impairment  Goal: Optimal Eating and Swallowing Without Aspiration  Outcome: Progressing  Intervention: Optimize Eating and Swallowing  Flowsheets (Taken 4/25/2024 2305)  Aspiration Precautions: awake/alert before oral intake  Swallowing Interventions: Dysphagia:   food and liquid intake alternated   foods moistened   small bites/sips encouraged  Swallowing Method: throat clear/extra swallow     Problem: Hypertension Comorbidity  Goal: Blood Pressure in Desired  Range  Outcome: Progressing  Intervention: Maintain Blood Pressure Management  Flowsheets (Taken 4/25/2024 2305)  Syncope Management: legs elevated  Medication Review/Management: medications reviewed     Problem: Osteoarthritis Comorbidity  Goal: Maintenance of Osteoarthritis Symptom Control  Outcome: Progressing  Intervention: Maintain Osteoarthritis Symptom Control  Flowsheets (Taken 4/25/2024 2305)  Assistive Device Utilized:   gait belt   walker  Activity Management: Rolling - L1  Medication Review/Management: medications reviewed     Problem: Pain Chronic (Persistent) (Comorbidity Management)  Goal: Acceptable Pain Control and Functional Ability  Outcome: Progressing  Intervention: Develop Pain Management Plan  Flowsheets (Taken 4/25/2024 2305)  Pain Management Interventions:   pain management plan reviewed with patient/caregiver   position adjusted   quiet environment facilitated   relaxation techniques promoted  Intervention: Manage Persistent Pain  Flowsheets (Taken 4/25/2024 2305)  Sleep/Rest Enhancement: awakenings minimized  Bowel Elimination Promotion: adequate fluid intake promoted  Medication Review/Management: medications reviewed  Intervention: Optimize Psychosocial Wellbeing  Flowsheets (Taken 4/25/2024 2305)  Spiritual Activities Assistance: affirmation provided  Supportive Measures: active listening utilized  Diversional Activities: television  Family/Support System Care: support provided     Problem: Skin Injury Risk Increased  Goal: Skin Health and Integrity  Outcome: Progressing  Intervention: Optimize Skin Protection  Flowsheets (Taken 4/25/2024 2305)  Pressure Reduction Techniques: frequent weight shift encouraged  Pressure Reduction Devices: positioning supports utilized  Skin Protection: protective footwear used  Activity Management: Rolling - L1  Head of Bed (HOB) Positioning: HOB at 45 degrees  Intervention: Promote and Optimize Oral Intake  Flowsheets (Taken 4/25/2024 2305)  Oral  Nutrition Promotion:   calorie-dense foods provided   calorie-dense liquids provided  Nutrition Interventions: food preferences provided

## 2024-04-26 NOTE — PT/OT/SLP PROGRESS
Occupational Therapy  Treatment    Name: Terence Tanner Jr.    : 1945 (79 y.o.)  MRN: [id number]           TREATMENT SUMMARY AND RECOMMENDATIONS:      OT Date of Treatment: 24  OT Start Time: 1329  OT Stop Time: 1400  OT Total Time (min): 31 min      Subjective Assessment:   No complaints  Lethargic   X Awake, alert, cooperative  Impulsive    Uncooperative   Flat affect    Agitated X c/o pain    Appropriate  c/o fatigue    Confused X Treated at bedside     Emotionally labile X Treated in gym/dept.      Other:        Therapy Precautions:    Cognitive deficits  Spinal precautions    Collar - hard  Sternal precautions    Collar - soft   TLSO   X Fall risk  LSO    Hip precautions - posterior  Knee immobilizer    Hip precautions - anterior X WBAT    Impaired communication  Partial weightbearing    Oxygen  TTWB    PEG tube  NWB    Visual deficits      Hearing deficits  X Other: ROMAT       Treatment Objectives:     Mobility Training:    Mobility task Assist level Comments    Bed mobility SBA EOB>supine   Transfer SBA-CGA Stand step t/f c RW w/c>EOB   Sit to stands transitions     Functional mobility     Sitting balance     Standing balance      Other:            Therapeutic Exercise:   Exercise Sets Reps Comments   UB 2 15 With 3# dowel, pt perf Shoulder press, chest press, straight arm raises, bicep curls, forward rows, backwards rows. Min length r/bs between trials.                          Additional Comments:  Extensive discussion held with OT, pt, pt's spouse, pt's family, and MD regarding pt's current level of function, current home environment, and potential concerns related to discharge.     Assessment: Patient tolerated session well. Pt would continue to benefit from skilled OT services to improve pt's safety and independence with daily occupations, decrease caregiver burden, and reduce pt's risk of falls.     GOALS:   Multidisciplinary Problems       Occupational Therapy Goals          Problem:  Occupational Therapy    Goal Priority Disciplines Outcome Interventions   Occupational Therapy Goal     OT, PT/OT Progressing    Description: Goals to be met by: 5/17/2024     Patient will increase functional independence with ADLs by performing:    LE Dressing with Stand-by Assistance.  Grooming while standing at sink with Stand-by Assistance.  Toileting from toilet with Stand-by Assistance for hygiene and clothing management.   Toilet transfer to toilet with Stand-by Assistance.                         Recommendations:     Discharge Equipment Recommendations:  none     Plan:     Patient to be seen  (5-6x/week (QD-BID)) to address the above listed problems via self-care/home management, neuromuscular re-education, therapeutic activities, therapeutic exercises  Plan of Care Expires: 05/17/24  Plan of Care Reviewed with: patient, spouse  Revisions made to plan of care: No      Skilled OT Minutes Provided: 31  Communication with Treatment Team:     Equipment recommendations:       At end of treatment, patient remained:   Up in chair     Up in wheelchair in room   X In bed   X With alarm activated   X Bed rails up   X Call bell in reach    X Family/friends present    Restraints secured properly    In bathroom with CNA/RN notified    In gym with PT/PTA/tech    Nurse aware    Other:

## 2024-04-26 NOTE — PT/OT/SLP PROGRESS
Physical Therapy Treatment Note           Name: Terence Tanner Jr.    : 1945 (79 y.o.)  MRN: [id number]           TREATMENT SUMMARY AND RECOMMENDATIONS:    PT Received On: 24  PT Start Time: 1030     PT Stop Time: 1055  PT Total Time (min): 25 min     Subjective Assessment:   No complaints  Lethargic   x Awake, alert, cooperative  Uncooperative    Agitated x c/o pain    Appropriate  c/o fatigue    Confused  Treated at bedside     Emotionally labile  Treated in gym/dept.    Impulsive  Other:    Flat affect       Therapy Precautions:    Cognitive deficits  Spinal precautions    Collar - hard  Sternal precautions    Collar - soft   TLSO    Fall risk  LSO    Hip precautions - posterior  Knee immobilizer    Hip precautions - anterior  WBAT    Impaired communication  Partial weightbearing    Oxygen  TTWB    PEG tube  NWB    Visual deficits  Other:    Hearing deficits          Treatment Objectives:     Mobility Training:   Assist level Comments    Bed mobility     Transfer     Gait CGA Amb on firm surface with RW 80 ft    Sit to stand transitions CGA Sit-stand 5 reps with B UE use   Sitting balance     Standing balance      Wheelchair mobility     Car transfer     Other:          Therapeutic Exercise:   Exercise Sets Reps Comments   B LE exer sitting  1 20 Ankle pumps, TKE, abd/add, knee lifts    R LE exer standing 2 5 B UE supported- abd/add, knee lifts                    Additional Comments:  R knee brace donned     Assessment: Patient tolerated session fair.    PT Plan: continue  Revisions made to plan of care: No    GOALS:   Multidisciplinary Problems       Physical Therapy Goals          Problem: Physical Therapy    Goal Priority Disciplines Outcome Goal Variances Interventions   Physical Therapy Goal     PT, PT/OT Progressing     Description: Goals to be met by: Discharge      Patient will increase functional independence with mobility by performin. Sit to stand transfer with Modified  [place]  2. Bed to chair transfer with Modified [place] using Rolling Walker  3. Gait  x at least 50 feet with Modified [place] and up to 175 feet with SBA using Rolling Walker.   4. Increased functional strength to 4/5 for R LE  5. Supine<>Sit with MOD I.                         Skilled PT Minutes Provided: 25   Communication with Treatment Team:     Equipment recommendations:       At end of treatment, patient remained:   Up in chair     Up in wheelchair in room    In bed    With alarm activated    Bed rails up    Call bell in reach    x Family/friends present    Restraints secured properly   x In bathroom with CNA/RN notified    Nurse aware    In gym with therapist/tech    Other:

## 2024-04-26 NOTE — PLAN OF CARE
Spoke with patient's spouse, son, and home care givers concerning insurance and that patient only has [facility] medicare managed care. They are [facility] to get [facility] with [facility]. They are also requesting a hospital bed on discharge. Will notify jerardo

## 2024-04-26 NOTE — PT/OT/SLP PROGRESS
"         Physical Therapy Treatment Note           Name: Terence Tanner Jr.    : 1945 (79 y.o.)  MRN: [id number]           TREATMENT SUMMARY AND RECOMMENDATIONS:    PT Received On: 24  PT Start Time: 1300     PT Stop Time: 1325  PT Total Time (min): 25 min     Subjective Assessment:   No complaints  Lethargic   x Awake, alert, cooperative  Uncooperative    Agitated x c/o pain    Appropriate x c/o fatigue    Confused  Treated at bedside     Emotionally labile x Treated in gym/dept.    Impulsive  Other:    Flat affect       Therapy Precautions:    Cognitive deficits  Spinal precautions    Collar - hard  Sternal precautions    Collar - soft   TLSO   x Fall risk  LSO    Hip precautions - posterior  Knee immobilizer    Hip precautions - anterior x WBAT    Impaired communication  Partial weightbearing    Oxygen  TTWB    PEG tube  NWB    Visual deficits  Other:    Hearing deficits          Treatment Objectives:     Mobility Training:   Assist level Comments    Bed mobility SBA Supine<>Sit   Transfer CGA/SBA Bed<>WC, WC<>Car and Car transfers with family - pt reported "don't help, just let me do it".    Gait     Sit to stand transitions SBA    Sitting balance     Standing balance      Wheelchair mobility     Car transfer     Other:          Therapeutic Exercise:   Exercise Sets Reps Comments                               Additional Comments:  Extra time spent talking with family, where there is concern regarding them being able to go home. Wife is unable to care for  or push him up the ramp. [facility] meeting [facility] to determine what [facility] can [facility].     Assessment: Patient tolerated session well, able to complete car transfers. Nursing printed out visit information for next week and provided it to family.     PT Plan: continue per POC- will need to focus on DC planning.     Revisions made to plan of care: No    GOALS:   Multidisciplinary Problems       Physical Therapy Goals          Problem: Physical Therapy "    Goal Priority Disciplines Outcome Goal Variances Interventions   Physical Therapy Goal     PT, PT/OT Progressing     Description: Goals to be met by: Discharge      Patient will increase functional independence with mobility by performin. Sit to stand transfer with Modified [place]  2. Bed to chair transfer with Modified [place] using Rolling Walker  3. Gait  x at least 50 feet with Modified [place] and up to 175 feet with SBA using Rolling Walker.   4. Increased functional strength to 4/5 for R LE  5. Supine<>Sit with MOD I.                         Skilled PT Minutes Provided: 23   Communication with Treatment Team:     Equipment recommendations:       At end of treatment, patient remained:  x Up in chair     Up in wheelchair in room    In bed    With alarm activated    Bed rails up    Call bell in reach     Family/friends present    Restraints secured properly    In bathroom with CNA/RN notified    Nurse aware   x In gym with therapist/tech    Other:

## 2024-04-26 NOTE — PT/OT/SLP PROGRESS
Occupational Therapy  Treatment    Name: Terence Tanner Jr.    : 1945 (79 y.o.)  MRN: [id number]           TREATMENT SUMMARY AND RECOMMENDATIONS:      OT Date of Treatment: 24  OT Start Time: 1011  OT Stop Time: 1034  OT Total Time (min): 23 min      Subjective Assessment:   No complaints  Lethargic   X Awake, alert, cooperative  Impulsive    Uncooperative   Flat affect    Agitated X c/o pain    Appropriate  c/o fatigue    Confused  Treated at bedside     Emotionally labile X Treated in gym/dept.      Other:        Therapy Precautions:    Cognitive deficits  Spinal precautions    Collar - hard  Sternal precautions    Collar - soft   TLSO   X Fall risk  LSO    Hip precautions - posterior  Knee immobilizer    Hip precautions - anterior X WBAT RLE    Impaired communication  Partial weightbearing    Oxygen  TTWB    PEG tube  NWB    Visual deficits      Hearing deficits  X Other: ROMAT RLE       Treatment Objectives:     Mobility Training:    Mobility task Assist level Comments    Bed mobility SBA Supine>EOB   Transfer     Sit to stands transitions SBA From EOB level   Functional mobility CGA Pt ambulated ~100ft c RW  Decreased kalpana   Sitting balance     Standing balance      Other:        ADL Training:    ADL Assist level Comments    Feeding     Grooming/hygiene     Bathing     Upper body dressing     Lower body dressing Setup Pt donned RLE knee brace   Toileting     Toilet transfer     Adaptive equipment training     Other:           Therapeutic Exercise:   Exercise Sets Reps Comments   UBE 2 5' F/B Mod resistance                         Assessment: Patient tolerated session well. Pt continues to be limited by B knee pain. Pt would continue to benefit from skilled OT services to improve pt's safety and independence with daily occupations, decrease caregiver burden, and reduce pt's risk of falls.     GOALS:   Multidisciplinary Problems       Occupational Therapy Goals          Problem: Occupational Therapy     Goal Priority Disciplines Outcome Interventions   Occupational Therapy Goal     OT, PT/OT Progressing    Description: Goals to be met by: 5/17/2024     Patient will increase functional independence with ADLs by performing:    LE Dressing with Stand-by Assistance.  Grooming while standing at sink with Stand-by Assistance.  Toileting from toilet with Stand-by Assistance for hygiene and clothing management.   Toilet transfer to toilet with Stand-by Assistance.                         Recommendations:     Discharge Equipment Recommendations:  none     Plan:     Patient to be seen  (5-6x/week (QD-BID)) to address the above listed problems via self-care/home management, neuromuscular re-education, therapeutic activities, therapeutic exercises  Plan of Care Expires: 05/17/24  Plan of Care Reviewed with: patient, spouse  Revisions made to plan of care: No      Skilled OT Minutes Provided: 23  Communication with Treatment Team:     Equipment recommendations:       At end of treatment, patient remained:   Up in chair     Up in wheelchair in room    In bed    With alarm activated    Bed rails up    Call bell in reach     Family/friends present    Restraints secured properly    In bathroom with CNA/RN notified   X In gym with PT/PTA/tech    Nurse aware    Other:

## 2024-04-27 PROCEDURE — 11000004 HC SNF PRIVATE

## 2024-04-27 PROCEDURE — 99900035 HC TECH TIME PER 15 MIN (STAT)

## 2024-04-27 PROCEDURE — 25000003 PHARM REV CODE 250: Performed by: INTERNAL MEDICINE

## 2024-04-27 PROCEDURE — 94760 N-INVAS EAR/PLS OXIMETRY 1: CPT

## 2024-04-27 PROCEDURE — 97110 THERAPEUTIC EXERCISES: CPT | Mod: CQ

## 2024-04-27 PROCEDURE — 25000003 PHARM REV CODE 250: Performed by: STUDENT IN AN ORGANIZED HEALTH CARE EDUCATION/TRAINING PROGRAM

## 2024-04-27 PROCEDURE — 63600175 PHARM REV CODE 636 W HCPCS: Performed by: STUDENT IN AN ORGANIZED HEALTH CARE EDUCATION/TRAINING PROGRAM

## 2024-04-27 PROCEDURE — 97116 GAIT TRAINING THERAPY: CPT | Mod: CQ

## 2024-04-27 PROCEDURE — 25000003 PHARM REV CODE 250: Performed by: PHYSICIAN ASSISTANT

## 2024-04-27 PROCEDURE — 63600175 PHARM REV CODE 636 W HCPCS: Performed by: PHYSICIAN ASSISTANT

## 2024-04-27 RX ORDER — FOLIC ACID 1 MG/1
1 TABLET ORAL DAILY
Status: DISCONTINUED | OUTPATIENT
Start: 2024-04-27 | End: 2024-05-01 | Stop reason: HOSPADM

## 2024-04-27 RX ORDER — METHYLPREDNISOLONE 4 MG/1
4 TABLET ORAL DAILY
Status: DISCONTINUED | OUTPATIENT
Start: 2024-04-27 | End: 2024-05-01 | Stop reason: HOSPADM

## 2024-04-27 RX ADMIN — ENOXAPARIN SODIUM 40 MG: 40 INJECTION SUBCUTANEOUS at 06:04

## 2024-04-27 RX ADMIN — BISACODYL 10 MG: 10 SUPPOSITORY RECTAL at 11:04

## 2024-04-27 RX ADMIN — METHYLPREDNISOLONE 4 MG: 4 TABLET ORAL at 02:04

## 2024-04-27 RX ADMIN — ACETAMINOPHEN AND CODEINE PHOSPHATE 1 TABLET: 300; 30 TABLET ORAL at 09:04

## 2024-04-27 RX ADMIN — LIDOCAINE 5% 1 PATCH: 700 PATCH TOPICAL at 09:04

## 2024-04-27 RX ADMIN — ACETAMINOPHEN AND CODEINE PHOSPHATE 1 TABLET: 300; 30 TABLET ORAL at 02:04

## 2024-04-27 RX ADMIN — METHOCARBAMOL 750 MG: 750 TABLET ORAL at 09:04

## 2024-04-27 RX ADMIN — Medication 1 TABLET: at 09:04

## 2024-04-27 RX ADMIN — CIPROFLOXACIN HYDROCHLORIDE 500 MG: 500 TABLET, FILM COATED ORAL at 09:04

## 2024-04-27 RX ADMIN — PANTOPRAZOLE SODIUM 40 MG: 40 TABLET, DELAYED RELEASE ORAL at 09:04

## 2024-04-27 RX ADMIN — FOLIC ACID 1 MG: 1 TABLET ORAL at 09:04

## 2024-04-27 RX ADMIN — LISINOPRIL 40 MG: 20 TABLET ORAL at 09:04

## 2024-04-27 RX ADMIN — METHOCARBAMOL 750 MG: 750 TABLET ORAL at 02:04

## 2024-04-27 RX ADMIN — ACETAMINOPHEN AND CODEINE PHOSPHATE 1 TABLET: 300; 30 TABLET ORAL at 06:04

## 2024-04-27 RX ADMIN — SENNOSIDES AND DOCUSATE SODIUM 2 TABLET: 8.6; 5 TABLET ORAL at 09:04

## 2024-04-27 NOTE — PT/OT/SLP PROGRESS
Physical Therapy Treatment Note           Name: Terence Tanner Jr.    : 1945 (79 y.o.)  MRN: [id number]           TREATMENT SUMMARY AND RECOMMENDATIONS:    PT Received On: 24  PT Start Time: 936     PT Stop Time: 1005  PT Total Time (min): 29 min     Subjective Assessment:   No complaints  Lethargic   x Awake, alert, cooperative  Uncooperative    Agitated x c/o pain    Appropriate  c/o fatigue    Confused x Treated at bedside     Emotionally labile  Treated in gym/dept.    Impulsive  Other:    Flat affect       Therapy Precautions:    Cognitive deficits  Spinal precautions    Collar - hard  Sternal precautions    Collar - soft   TLSO   x Fall risk  LSO    Hip precautions - posterior  Knee immobilizer    Hip precautions - anterior x WBAT    Impaired communication  Partial weightbearing    Oxygen  TTWB    PEG tube  NWB    Visual deficits  Other:    Hearing deficits          Treatment Objectives:     Mobility Training:   Assist level Comments    Bed mobility     Transfer     Gait CGA Amb on firm surface with RW 2 x 80 ft    Sit to stand transitions CGA Sit-stand 5 reps with B UE use   Sitting balance     Standing balance      Wheelchair mobility     Car transfer     Other:          Therapeutic Exercise:   Exercise Sets Reps Comments   B LE exer sitting  1 20 Ankle pumps, TKE, abd/add, knee lifts    R LE exer standing 2 5 B UE supported- abd/add, knee lifts                    Additional Comments:  R knee brace donned     Assessment: Patient tolerated session fair.    PT Plan: continue  Revisions made to plan of care: No    GOALS:   Multidisciplinary Problems       Physical Therapy Goals          Problem: Physical Therapy    Goal Priority Disciplines Outcome Goal Variances Interventions   Physical Therapy Goal     PT, PT/OT Progressing     Description: Goals to be met by: Discharge      Patient will increase functional independence with mobility by performin. Sit to stand transfer with Modified  [place]  2. Bed to chair transfer with Modified [place] using Rolling Walker  3. Gait  x at least 50 feet with Modified [place] and up to 175 feet with SBA using Rolling Walker.   4. Increased functional strength to 4/5 for R LE  5. Supine<>Sit with MOD I.                         Skilled PT Minutes Provided: 29   Communication with Treatment Team:     Equipment recommendations:       At end of treatment, patient remained:   Up in chair     Up in wheelchair in room   x In bed    With alarm activated   x Bed rails up   x Call bell in reach    x Family/friends present    Restraints secured properly    In bathroom with CNA/RN notified    Nurse aware    In gym with therapist/tech    Other:

## 2024-04-27 NOTE — PLAN OF CARE
Problem: Adult Inpatient Plan of Care  Goal: Plan of Care Review  Outcome: Progressing  Flowsheets (Taken 4/27/2024 1038)  Plan of Care Reviewed With:   patient   family  Goal: Patient-Specific Goal (Individualized)  Outcome: Progressing  Flowsheets (Taken 4/27/2024 1038)  Anxieties, Fears or Concerns: pain management  Individualized Care Needs: pain management, promote safety with mobility, meds crushed in pudding  Goal: Absence of Hospital-Acquired Illness or Injury  Outcome: Progressing  Intervention: Identify and Manage Fall Risk  Flowsheets (Taken 4/27/2024 1038)  Safety Promotion/Fall Prevention:   assistive device/personal item within reach   bed alarm set   family to remain at bedside   instructed to call staff for mobility   side rails raised x 3   nonskid shoes/socks when out of bed  Intervention: Prevent Skin Injury  Flowsheets (Taken 4/27/2024 1038)  Body Position: sitting up in bed  Skin Protection: incontinence pads utilized  Device Skin Pressure Protection:   absorbent pad utilized/changed   positioning supports utilized   tubing/devices free from skin contact  Intervention: Prevent and Manage VTE (Venous Thromboembolism) Risk  Flowsheets (Taken 4/27/2024 1038)  VTE Prevention/Management:   ambulation promoted   bleeding precations maintained   fluids promoted  Intervention: Prevent Infection  Flowsheets (Taken 4/27/2024 1038)  Infection Prevention:   cohorting utilized   environmental surveillance performed   hand hygiene promoted  Goal: Optimal Comfort and Wellbeing  Outcome: Progressing  Intervention: Monitor Pain and Promote Comfort  Flowsheets (Taken 4/27/2024 1038)  Pain Management Interventions:   care clustered   relaxation techniques promoted   quiet environment facilitated  Intervention: Provide Person-Centered Care  Flowsheets (Taken 4/27/2024 1038)  Trust Relationship/Rapport:   care explained   choices provided   questions answered   questions encouraged  Goal: Readiness for Transition  of Care  Outcome: Progressing  Intervention: Mutually Develop Transition Plan  Flowsheets (Taken 4/27/2024 1038)  Communicated LIZABETH with patient/caregiver: Date not available/Unable to determine     Problem: Infection  Goal: Absence of Infection Signs and Symptoms  Outcome: Progressing  Intervention: Prevent or Manage Infection  Flowsheets (Taken 4/27/2024 1038)  Fever Reduction/Comfort Measures:   lightweight bedding   lightweight clothing  Infection Management: aseptic technique maintained     Problem: Fall Injury Risk  Goal: Absence of Fall and Fall-Related Injury  Outcome: Progressing  Intervention: Identify and Manage Contributors  Flowsheets (Taken 4/27/2024 1038)  Self-Care Promotion:   independence encouraged   BADL personal objects within reach  Medication Review/Management: medications reviewed  Intervention: Promote Injury-Free Environment  Flowsheets (Taken 4/27/2024 1038)  Safety Promotion/Fall Prevention:   assistive device/personal item within reach   bed alarm set   family to remain at bedside   instructed to call staff for mobility   side rails raised x 3   nonskid shoes/socks when out of bed     Problem: Swallowing Impairment  Goal: Optimal Eating and Swallowing Without Aspiration  Outcome: Progressing  Intervention: Optimize Eating and Swallowing  Flowsheets (Taken 4/27/2024 1038)  Aspiration Precautions:   awake/alert before oral intake   upright posture maintained  Swallowing Interventions: Dysphagia:   food and liquid intake alternated   small bites/sips encouraged  Swallowing Method: throat clear/extra swallow     Problem: Hypertension Comorbidity  Goal: Blood Pressure in Desired Range  Outcome: Progressing  Intervention: Maintain Blood Pressure Management  Flowsheets (Taken 4/27/2024 1038)  Medication Review/Management: medications reviewed     Problem: Osteoarthritis Comorbidity  Goal: Maintenance of Osteoarthritis Symptom Control  Outcome: Progressing  Intervention: Maintain Osteoarthritis  Symptom Control  Flowsheets (Taken 4/27/2024 1038)  Assistive Device Utilized:   gait belt   walker  Activity Management: Ambulated to bathroom - L4  Medication Review/Management: medications reviewed     Problem: Pain Chronic (Persistent) (Comorbidity Management)  Goal: Acceptable Pain Control and Functional Ability  Outcome: Progressing  Intervention: Develop Pain Management Plan  Flowsheets (Taken 4/27/2024 1038)  Pain Management Interventions:   care clustered   relaxation techniques promoted   quiet environment facilitated  Intervention: Manage Persistent Pain  Flowsheets (Taken 4/27/2024 1038)  Sleep/Rest Enhancement:   awakenings minimized   relaxation techniques promoted   regular sleep/rest pattern promoted  Bowel Elimination Promotion: adequate fluid intake promoted  Medication Review/Management: medications reviewed  Intervention: Optimize Psychosocial Wellbeing  Flowsheets (Taken 4/27/2024 1038)  Supportive Measures:   active listening utilized   verbalization of feelings encouraged   self-care encouraged  Diversional Activities: television     Problem: Skin Injury Risk Increased  Goal: Skin Health and Integrity  Outcome: Progressing  Intervention: Optimize Skin Protection  Flowsheets (Taken 4/27/2024 1038)  Pressure Reduction Techniques: frequent weight shift encouraged  Pressure Reduction Devices: positioning supports utilized  Skin Protection: incontinence pads utilized  Activity Management: Ambulated to bathroom - L4  Head of Bed (HOB) Positioning: HOB at 30-45 degrees  Intervention: Promote and Optimize Oral Intake  Flowsheets (Taken 4/27/2024 1038)  Oral Nutrition Promotion:   calorie-dense foods provided   calorie-dense liquids provided  Nutrition Interventions: food preferences provided

## 2024-04-27 NOTE — PLAN OF CARE
Problem: Adult Inpatient Plan of Care  Goal: Patient-Specific Goal (Individualized)  Flowsheets (Taken 4/26/2024 1945)  Anxieties, Fears or Concerns: continue with pain management and monitor for improvement  Individualized Care Needs: Continue with pain management and encourage pt to continue working with PT/OT for improved ambulation and strengthening.     Problem: Fall Injury Risk  Goal: Absence of Fall and Fall-Related Injury  Intervention: Identify and Manage Contributors  Flowsheets (Taken 4/26/2024 1930)  Self-Care Promotion:   independence encouraged   BADL personal objects within reach   BADL personal routines maintained  Medication Review/Management:   medications reviewed   high-risk medications identified     Problem: Swallowing Impairment  Goal: Optimal Eating and Swallowing Without Aspiration  Intervention: Optimize Eating and Swallowing  Flowsheets (Taken 4/27/2024 0642)  Aspiration Precautions:   awake/alert before oral intake   oral hygiene care promoted   upright posture maintained  Swallowing Interventions: Dysphagia: food and liquid intake alternated

## 2024-04-28 PROCEDURE — 25000003 PHARM REV CODE 250: Performed by: PHYSICIAN ASSISTANT

## 2024-04-28 PROCEDURE — 25000003 PHARM REV CODE 250: Performed by: INTERNAL MEDICINE

## 2024-04-28 PROCEDURE — 11000004 HC SNF PRIVATE

## 2024-04-28 PROCEDURE — 63600175 PHARM REV CODE 636 W HCPCS: Performed by: STUDENT IN AN ORGANIZED HEALTH CARE EDUCATION/TRAINING PROGRAM

## 2024-04-28 PROCEDURE — 25000003 PHARM REV CODE 250: Performed by: STUDENT IN AN ORGANIZED HEALTH CARE EDUCATION/TRAINING PROGRAM

## 2024-04-28 PROCEDURE — 99900035 HC TECH TIME PER 15 MIN (STAT)

## 2024-04-28 PROCEDURE — 94760 N-INVAS EAR/PLS OXIMETRY 1: CPT

## 2024-04-28 PROCEDURE — 63600175 PHARM REV CODE 636 W HCPCS: Performed by: PHYSICIAN ASSISTANT

## 2024-04-28 RX ORDER — AMOXICILLIN 250 MG
2 CAPSULE ORAL 2 TIMES DAILY PRN
Status: DISCONTINUED | OUTPATIENT
Start: 2024-04-28 | End: 2024-05-01 | Stop reason: HOSPADM

## 2024-04-28 RX ORDER — CIPROFLOXACIN 500 MG/1
500 TABLET ORAL EVERY 12 HOURS
Status: COMPLETED | OUTPATIENT
Start: 2024-04-28 | End: 2024-04-28

## 2024-04-28 RX ADMIN — CIPROFLOXACIN HYDROCHLORIDE 500 MG: 500 TABLET, FILM COATED ORAL at 09:04

## 2024-04-28 RX ADMIN — METHOCARBAMOL 750 MG: 750 TABLET ORAL at 09:04

## 2024-04-28 RX ADMIN — FOLIC ACID 1 MG: 1 TABLET ORAL at 09:04

## 2024-04-28 RX ADMIN — ENOXAPARIN SODIUM 40 MG: 40 INJECTION SUBCUTANEOUS at 04:04

## 2024-04-28 RX ADMIN — LIDOCAINE 5% 1 PATCH: 700 PATCH TOPICAL at 09:04

## 2024-04-28 RX ADMIN — Medication 1 TABLET: at 09:04

## 2024-04-28 RX ADMIN — LISINOPRIL 40 MG: 20 TABLET ORAL at 09:04

## 2024-04-28 RX ADMIN — PANTOPRAZOLE SODIUM 40 MG: 40 TABLET, DELAYED RELEASE ORAL at 09:04

## 2024-04-28 RX ADMIN — METHYLPREDNISOLONE 4 MG: 4 TABLET ORAL at 09:04

## 2024-04-28 RX ADMIN — METHOCARBAMOL 750 MG: 750 TABLET ORAL at 03:04

## 2024-04-28 RX ADMIN — ACETAMINOPHEN AND CODEINE PHOSPHATE 1 TABLET: 300; 30 TABLET ORAL at 05:04

## 2024-04-28 NOTE — PROGRESS NOTES
"[facility]  [facility] MEDICINE ~ PROGRESS NOTE    CHIEF COMPLAINT   Hospital follow up for right femur fracture    HOSPITAL COURSE   79 years old male with a past medical history of rheumatoid arthritis, osteoarthritis, osteopenia, hx of partial colon resection ("black spot on colon" - reportedly non cancerous), and newly diagnosed HTN who initially presented to [facility] s/p fall at home resulting in a right intertrochanteric femur fracture. Patient subsequently underwent IM nailing of right intertrochanteric femur fracture on 04/11/2024 with Dr. Tatum.  Patient was noted to have elevated blood pressure readings during admission therefore was initiated on lisinopril, currently receiving 40 mg daily. Patient was transferred to our swing unit for continued therapy.  On exam today, patient denies any complaints.  He does endorse an episode of choking while eating breakfast this morning but contributes this to the dry food.  Will obtain SLP eval prior to initiating diet.  At baseline, patient does use an assistive device to walk, mainly Rollator.  He also has a powered wheelchair, a walker, a ramp at home, and lives with his wife who does assist with ADLs as needed.     Today:  Had multiple BM's yesterday   OBJECTIVE/PHYSICAL EXAM     VITAL SIGNS (MOST RECENT):  Temp: 97.6 °F (36.4 °C) (04/28/24 0733)  Pulse: 86 (04/28/24 0733)  Resp: 18 (04/28/24 0523)  BP: 114/71 (04/28/24 0733)  SpO2: 98 % (04/28/24 0733) VITAL SIGNS (24 HOUR RANGE):  Temp:  [97.6 °F (36.4 °C)-98.3 °F (36.8 °C)] 97.6 °F (36.4 °C)  Pulse:  [84-89] 86  Resp:  [17-19] 18  SpO2:  [95 %-98 %] 98 %  BP: (114-135)/(66-71) 114/71   GENERAL: In no acute distress, afebrile  HEENT:  Atraumatic, normocephalic, moist mucous membranes  CHEST: Clear to auscultation bilaterally  HEART: S1, S2, no appreciable murmur  ABDOMEN: Soft, nontender, BS +  MSK: Warm, no lower extremity edema, RA changes throughout   NEUROLOGIC: Alert and oriented " "x4, moving all extremities   INTEGUMENTARY: Right hip incision without erythema or drainage, staples in place   PSYCHIATRY: Appropriate mood and affect     ASSESSMENT/PLAN   R intertrochanteric femur fracture  Mechanical Fall  Underwent IM nailing of right intertrochanteric femur fracture on 04/11/2024 with Dr. Tatum  WBAT to RLE, full ROM   Lovenox for DVT prevention, and orthopedics recommends aspirin 81 mg b.i.d. upon discharge, but patient is allergic.  PT/OT  Follow-up with Dr. Tatum on 04/30/2024 at 0800, patient can perform car transfers per Physical therapy  Pain control as needed - continue prn Tylenol No. 3, lidocaine patch    Dysphagia  Small hiatal hernia with Schatzki's ring  MBS on 04/18/2024 - SLP recommended soft and bite size diet with gravy and mildly thick liquids  Esophagram revealed small sliding hiatal hernia with subtle Schatzki's ring   Protonix daily for 8 weeks     Post-op anemia   Normocytic anemia  Iron panel more consistent with anemia of chronic disease   Folate low normal, start supplementation     HTN   Currently receiving Lisinopril 40mg QD     Rheumatoid arthritis, osteoarthritis  Continue home methylprednisone 4 mg daily  PPI during admission    Klebsiella pneumoniae and Klebsiella aerogenes +sputum   Cipro PO x5 days      DVT prophylaxis: Lovenox   Anticipated discharge and disposition: Continue PT/OT  __________________________________________________________________________    LABS/MICRO/MEDS/DIAGNOSTICS     LABS  No results for input(s): "NA", "K", "CHLORIDE", "CO2", "BUN", "CREATININE", "GLUCOSE", "CALCIUM", "ALKPHOS", "AST", "ALT", "ALBUMIN" in the last 72 hours.    No results for input(s): "WBC", "RBC", "HCT", "MCV", "PLT" in the last 72 hours.    Invalid input(s): "HG"    MICROBIOLOGY  Microbiology Results (last 7 days)       Procedure Component Value Units Date/Time    Respiratory Culture [[id number]]  (Abnormal)  (Susceptibility) Collected: 04/22/24 1926    Order " "Status: Completed Specimen: Sputum, Induced Updated: 04/26/24 1043     Respiratory Culture Many Klebsiella pneumoniae      Many KLEBSIELLA AEROGENES     Comment: with normal respiratory stuart        GRAM STAIN Quality 3+      Few Gram positive cocci      Rare Yeast             MEDICATIONS  Current Facility-Administered Medications   Medication Dose Route Frequency    calcium-vitamin D3  1 tablet Oral BID    ciprofloxacin HCl  500 mg Oral Q12H    enoxparin  40 mg Subcutaneous Daily    folic acid  1 mg Oral Daily    LIDOcaine  1 patch Transdermal Q24H    LIDOcaine  1 patch Transdermal Q24H    lisinopriL  40 mg Oral Daily    methocarbamoL  750 mg Oral TID    methylPREDNISolone  4 mg Oral Daily    pantoprazole  40 mg Oral Daily         INFUSIONS  Current Facility-Administered Medications   Medication Dose Route Frequency Last Rate Last Admin        DIAGNOSTIC TESTS  FL Esophagram Complete   Final Result      Small sliding hiatal hernia with subtle Schatzki's ring         Electronically signed by: Trisah Lowery   Date:    04/25/2024   Time:    11:20      X-Ray Abdomen Flat And Erect   Final Result      Moderate colonic fecal loading.  Nonspecific bowel gas pattern.         Electronically signed by: Orion Pettit   Date:    04/22/2024   Time:    15:05      Fl Modified Barium Swallow Speech   Final Result         No results found for: "EF"     NUTRITION STATUS  Patient meets ASPEN criteria for moderate malnutrition of acute illness or injury per RD assessment as evidenced by:  Energy Intake (Malnutrition): less than 75% for greater than 7 days  Weight Loss (Malnutrition): other (see comments) (-5.97% in recent months. Reported 10# wt loss pt stated)  Subcutaneous Fat (Malnutrition): moderate depletion  Muscle Mass (Malnutrition): moderate depletion           A minimum of two characteristics is recommended for diagnosis of either severe or non-severe malnutrition.     Case related differential diagnoses have been " reviewed; assessment and plan has been documented. I have personally reviewed the labs and test results that are currently available; I have reviewed the patients medication list. I have reviewed the consulting providers recommendations. I have reviewed or attempted to review medical records based upon their availability.  All of the patient's and/or family's questions have been addressed and answered to the best of my ability.  I will continue to monitor closely and make adjustments to medical management as needed.  This document was created using M*Modal Fluency Direct.  Transcription errors may have been made.  Please contact me if any questions may rise regarding documentation to clarify transcription.      Thairy G Reyes, DO   Internal Medicine  Department of Hospital Medicine [facility]

## 2024-04-28 NOTE — PLAN OF CARE
Problem: Adult Inpatient Plan of Care  Goal: Plan of Care Review  Outcome: Progressing  Goal: Patient-Specific Goal (Individualized)  Outcome: Progressing  Flowsheets (Taken 4/28/2024 1546)  Anxieties, Fears or Concerns: pain management  Individualized Care Needs: pain regimen, safety with mobility  Goal: Absence of Hospital-Acquired Illness or Injury  Outcome: Progressing  Intervention: Identify and Manage Fall Risk  Flowsheets (Taken 4/28/2024 1544)  Safety Promotion/Fall Prevention:   assistive device/personal item within reach   bed alarm set   nonskid shoes/socks when out of bed   side rails raised x 2  Goal: Optimal Comfort and Wellbeing  Outcome: Progressing  Goal: Readiness for Transition of Care  Outcome: Progressing     Problem: Infection  Goal: Absence of Infection Signs and Symptoms  Outcome: Progressing     Problem: Fall Injury Risk  Goal: Absence of Fall and Fall-Related Injury  Outcome: Progressing  Intervention: Identify and Manage Contributors  Flowsheets (Taken 4/28/2024 1547)  Self-Care Promotion:   independence encouraged   adaptive equipment use encouraged   BADL personal objects within reach  Medication Review/Management: medications reviewed     Problem: Swallowing Impairment  Goal: Optimal Eating and Swallowing Without Aspiration  Outcome: Progressing     Problem: Hypertension Comorbidity  Goal: Blood Pressure in Desired Range  Outcome: Progressing  Intervention: Maintain Blood Pressure Management  Flowsheets (Taken 4/28/2024 1540)  Medication Review/Management: medications reviewed     Problem: Osteoarthritis Comorbidity  Goal: Maintenance of Osteoarthritis Symptom Control  Outcome: Progressing     Problem: Pain Chronic (Persistent) (Comorbidity Management)  Goal: Acceptable Pain Control and Functional Ability  Outcome: Progressing     Problem: Skin Injury Risk Increased  Goal: Skin Health and Integrity  Outcome: Progressing  Intervention: Optimize Skin Protection  Flowsheets (Taken  4/28/2024 1546)  Skin Protection:   drying agents applied   incontinence pads utilized  Activity Management: Rolling - L1  Head of Bed (HOB) Positioning: HOB at 20-30 degrees

## 2024-04-29 LAB
ANION GAP SERPL CALC-SCNC: 8 MEQ/L
BASOPHILS # BLD AUTO: 0.03 X10(3)/MCL
BASOPHILS NFR BLD AUTO: 0.5 %
BUN SERPL-MCNC: 20.5 MG/DL (ref 8.4–25.7)
CALCIUM SERPL-MCNC: 9 MG/DL (ref 8.8–10)
CHLORIDE SERPL-SCNC: 105 MMOL/L (ref 98–107)
CO2 SERPL-SCNC: 27 MMOL/L (ref 23–31)
CREAT SERPL-MCNC: 0.85 MG/DL (ref 0.73–1.18)
CREAT/UREA NIT SERPL: 24
EOSINOPHIL # BLD AUTO: 0.14 X10(3)/MCL (ref 0–0.9)
EOSINOPHIL NFR BLD AUTO: 2.3 %
ERYTHROCYTE [DISTWIDTH] IN BLOOD BY AUTOMATED COUNT: 13.8 % (ref 11.5–17)
GFR SERPLBLD CREATININE-BSD FMLA CKD-EPI: >60 MLS/MIN/1.73/M2
GLUCOSE SERPL-MCNC: 93 MG/DL (ref 82–115)
HCT VFR BLD AUTO: 32.3 % (ref 42–52)
HGB BLD-MCNC: 10.5 G/DL (ref 14–18)
IMM GRANULOCYTES # BLD AUTO: 0.02 X10(3)/MCL (ref 0–0.04)
IMM GRANULOCYTES NFR BLD AUTO: 0.3 %
LYMPHOCYTES # BLD AUTO: 1.56 X10(3)/MCL (ref 0.6–4.6)
LYMPHOCYTES NFR BLD AUTO: 25.6 %
MCH RBC QN AUTO: 30.6 PG (ref 27–31)
MCHC RBC AUTO-ENTMCNC: 32.5 G/DL (ref 33–36)
MCV RBC AUTO: 94.2 FL (ref 80–94)
MONOCYTES # BLD AUTO: 0.64 X10(3)/MCL (ref 0.1–1.3)
MONOCYTES NFR BLD AUTO: 10.5 %
NEUTROPHILS # BLD AUTO: 3.7 X10(3)/MCL (ref 2.1–9.2)
NEUTROPHILS NFR BLD AUTO: 60.8 %
PLATELET # BLD AUTO: 251 X10(3)/MCL (ref 130–400)
PMV BLD AUTO: 10.4 FL (ref 7.4–10.4)
POTASSIUM SERPL-SCNC: 4 MMOL/L (ref 3.5–5.1)
RBC # BLD AUTO: 3.43 X10(6)/MCL (ref 4.7–6.1)
SODIUM SERPL-SCNC: 140 MMOL/L (ref 136–145)
WBC # SPEC AUTO: 6.09 X10(3)/MCL (ref 4.5–11.5)

## 2024-04-29 PROCEDURE — 97110 THERAPEUTIC EXERCISES: CPT

## 2024-04-29 PROCEDURE — 97530 THERAPEUTIC ACTIVITIES: CPT | Mod: CQ

## 2024-04-29 PROCEDURE — 99900035 HC TECH TIME PER 15 MIN (STAT)

## 2024-04-29 PROCEDURE — 63600175 PHARM REV CODE 636 W HCPCS: Performed by: STUDENT IN AN ORGANIZED HEALTH CARE EDUCATION/TRAINING PROGRAM

## 2024-04-29 PROCEDURE — 36415 COLL VENOUS BLD VENIPUNCTURE: CPT | Performed by: STUDENT IN AN ORGANIZED HEALTH CARE EDUCATION/TRAINING PROGRAM

## 2024-04-29 PROCEDURE — 63600175 PHARM REV CODE 636 W HCPCS: Performed by: PHYSICIAN ASSISTANT

## 2024-04-29 PROCEDURE — 11000004 HC SNF PRIVATE

## 2024-04-29 PROCEDURE — 25000003 PHARM REV CODE 250: Performed by: STUDENT IN AN ORGANIZED HEALTH CARE EDUCATION/TRAINING PROGRAM

## 2024-04-29 PROCEDURE — 85025 COMPLETE CBC W/AUTO DIFF WBC: CPT | Performed by: STUDENT IN AN ORGANIZED HEALTH CARE EDUCATION/TRAINING PROGRAM

## 2024-04-29 PROCEDURE — 97530 THERAPEUTIC ACTIVITIES: CPT

## 2024-04-29 PROCEDURE — 94760 N-INVAS EAR/PLS OXIMETRY 1: CPT

## 2024-04-29 PROCEDURE — 97116 GAIT TRAINING THERAPY: CPT | Mod: CQ

## 2024-04-29 PROCEDURE — 97110 THERAPEUTIC EXERCISES: CPT | Mod: CQ

## 2024-04-29 PROCEDURE — 80048 BASIC METABOLIC PNL TOTAL CA: CPT | Performed by: STUDENT IN AN ORGANIZED HEALTH CARE EDUCATION/TRAINING PROGRAM

## 2024-04-29 PROCEDURE — 25000003 PHARM REV CODE 250: Performed by: INTERNAL MEDICINE

## 2024-04-29 PROCEDURE — 25000003 PHARM REV CODE 250: Performed by: PHYSICIAN ASSISTANT

## 2024-04-29 RX ORDER — LOPERAMIDE HYDROCHLORIDE 2 MG/1
2 CAPSULE ORAL
Status: DISCONTINUED | OUTPATIENT
Start: 2024-04-29 | End: 2024-05-01 | Stop reason: HOSPADM

## 2024-04-29 RX ADMIN — METHOCARBAMOL 750 MG: 750 TABLET ORAL at 08:04

## 2024-04-29 RX ADMIN — METHOCARBAMOL 750 MG: 750 TABLET ORAL at 03:04

## 2024-04-29 RX ADMIN — METHOCARBAMOL 750 MG: 750 TABLET ORAL at 09:04

## 2024-04-29 RX ADMIN — LOPERAMIDE HYDROCHLORIDE 2 MG: 2 CAPSULE ORAL at 03:04

## 2024-04-29 RX ADMIN — FOLIC ACID 1 MG: 1 TABLET ORAL at 09:04

## 2024-04-29 RX ADMIN — PANTOPRAZOLE SODIUM 40 MG: 40 TABLET, DELAYED RELEASE ORAL at 09:04

## 2024-04-29 RX ADMIN — LISINOPRIL 40 MG: 20 TABLET ORAL at 09:04

## 2024-04-29 RX ADMIN — LIDOCAINE 5% 1 PATCH: 700 PATCH TOPICAL at 09:04

## 2024-04-29 RX ADMIN — METHYLPREDNISOLONE 4 MG: 4 TABLET ORAL at 09:04

## 2024-04-29 RX ADMIN — Medication 1 TABLET: at 08:04

## 2024-04-29 RX ADMIN — Medication 1 TABLET: at 09:04

## 2024-04-29 RX ADMIN — ENOXAPARIN SODIUM 40 MG: 40 INJECTION SUBCUTANEOUS at 05:04

## 2024-04-29 NOTE — PT/OT/SLP PROGRESS
Occupational Therapy  Treatment    Name: Terence Tanner Jr.    : 1945 (79 y.o.)  MRN: [id number]           TREATMENT SUMMARY AND RECOMMENDATIONS:      OT Date of Treatment: 24  OT Start Time: 1000  OT Stop Time: 1025  OT Total Time (min): 25 min      Subjective Assessment:   No complaints  Lethargic   X Awake, alert, cooperative  Impulsive    Uncooperative   Flat affect    Agitated X c/o pain    Appropriate  c/o fatigue    Confused  Treated at bedside     Emotionally labile X Treated in gym/dept.      Other:        Therapy Precautions:    Cognitive deficits  Spinal precautions    Collar - hard  Sternal precautions    Collar - soft   TLSO   X Fall risk  LSO    Hip precautions - posterior  Knee immobilizer    Hip precautions - anterior X WBAT    Impaired communication  Partial weightbearing    Oxygen  TTWB    PEG tube  NWB    Visual deficits      Hearing deficits  X Other: ROMAT       Treatment Objectives:     Mobility Training:    Mobility task Assist level Comments    Bed mobility     Transfer CGA Stand step t/f chair>w/c>recliner   Sit to stands transitions CGA From chair/w/c level c BUE support   Functional mobility CGA Pt ascended ramp (~10ft) c RW.    Sitting balance     Standing balance      Other: w/c mobility SBA Pt ascended ramp in w/c propelling c BUE         Therapeutic Exercise:   Exercise Sets Reps Comments   UB strengthening 2 15 With 3# dowel, pt perf Shoulder press, chest press, straight arm raises, bicep curls, forward rows, backwards rows. Min length r/bs PRN.                          Assessment: Patient tolerated session well. Pt demonstrated good performance of ascending ramp to simulate entering home. Pt continues to report significant hip pain. Pt would continue to benefit from skilled OT services to improve pt's safety and independence with daily occupations, decrease caregiver burden, and reduce pt's risk of falls.     GOALS:   Multidisciplinary Problems       Occupational Therapy  Goals          Problem: Occupational Therapy    Goal Priority Disciplines Outcome Interventions   Occupational Therapy Goal     OT, PT/OT Progressing    Description: Goals to be met by: 5/17/2024     Patient will increase functional independence with ADLs by performing:    LE Dressing with Stand-by Assistance.  Grooming while standing at sink with Stand-by Assistance.  Toileting from toilet with Stand-by Assistance for hygiene and clothing management.   Toilet transfer to toilet with Stand-by Assistance.                         Recommendations:     Discharge Equipment Recommendations:  none     Plan:     Patient to be seen  (5-6x/week (QD-BID)) to address the above listed problems via self-care/home management, neuromuscular re-education, therapeutic activities, therapeutic exercises  Plan of Care Expires: 05/17/24  Plan of Care Reviewed with: patient, spouse  Revisions made to plan of care: No      Skilled OT Minutes Provided: 25  Communication with Treatment Team:     Equipment recommendations:       At end of treatment, patient remained:  X Up in chair     Up in wheelchair in room    In bed   X With alarm activated    Bed rails up   X Call bell in reach    X Family/friends present    Restraints secured properly    In bathroom with CNA/RN notified    In gym with PT/PTA/tech    Nurse aware    Other:

## 2024-04-29 NOTE — PROGRESS NOTES
"Inpatient Nutrition Assessment    Admit Date: 4/17/2024   Total duration of encounter: 12 days   Patient Age: 79 y.o.    Nutrition Recommendation/Prescription     Continue  mildly thick liquid: soft and bite sized gravy on meats. 2.Continue boost original 1 bottle BID provides 240 kcal and 10 gm of protein, per serving. 3. Monitor intake, wt, labs, medications    Communication of Recommendations: reviewed with provider and reviewed with patient    Nutrition Assessment     Malnutrition Assessment/Nutrition-Focused Physical Exam    Malnutrition Context: acute illness or injury (04/18/24 1455)  Malnutrition Level: moderate (04/18/24 1455)  Energy Intake (Malnutrition): less than 75% for greater than 7 days (04/18/24 1455)  Weight Loss (Malnutrition): other (see comments) (-5.97% in recent months. Reported 10# wt loss pt stated) (04/18/24 1455)  Subcutaneous Fat (Malnutrition): moderate depletion (04/18/24 1455)  Orbital Region (Subcutaneous Fat Loss): moderate depletion  Upper Arm Region (Subcutaneous Fat Loss): moderate depletion     Muscle Mass (Malnutrition): moderate depletion (04/18/24 1455)     Clavicle Bone Region (Muscle Loss): moderate depletion        Dorsal Hand (Muscle Loss): moderate depletion                    A minimum of two characteristics is recommended for diagnosis of either severe or non-severe malnutrition.    Chart Review    Reason Seen: continuous nutrition monitoring, length of stay, and follow-up    Malnutrition Screening Tool Results   Have you recently lost weight without trying?: Yes: 2-13 lbs  Have you been eating poorly because of a decreased appetite?: No   MST Score: 1   Diagnosis:  S/p fall at home resulting in a right intertrochanteric femur fracture   Post-op anemia   HTN  Dysphagia  Rheumatoid arthritis, osteoarthritis  Relevant Medical History: rheumatoid arthritis, osteoarthritis, osteopenia, hx of partial colon resection ("black spot on colon" - non cancerous     Scheduled " Medications:  calcium-vitamin D3, 1 tablet, BID  enoxparin, 40 mg, Daily  folic acid, 1 mg, Daily  LIDOcaine, 1 patch, Q24H  LIDOcaine, 1 patch, Q24H  lisinopriL, 40 mg, Daily  methocarbamoL, 750 mg, TID  methylPREDNISolone, 4 mg, Daily  pantoprazole, 40 mg, Daily    Continuous Infusions:   PRN Medications:   Current Facility-Administered Medications:     acetaminophen, 650 mg, Oral, Q4H PRN    acetaminophen-codeine 300-30mg, 1 tablet, Oral, Q2H PRN    albuterol-ipratropium, 3 mL, Nebulization, Q6H PRN    aluminum-magnesium hydroxide-simethicone, 30 mL, Oral, QID PRN    barium sulfate, 140 mL, Oral, ONCE PRN    benzonatate, 100 mg, Oral, TID PRN    bisacodyL, 10 mg, Rectal, Daily PRN    calcium carbonate, 500 mg, Oral, TID PRN    ez paste cream ba sulfate, 10 g, Oral, ONCE PRN    guaiFENesin 100 mg/5 ml, 200 mg, Oral, Q4H PRN    hydrALAZINE, 10 mg, Intravenous, Q4H PRN    ipratropium, 0.5 mg, Nebulization, QID PRN    labetalol, 10 mg, Intravenous, QID PRN    melatonin, 9 mg, Oral, Nightly PRN    naloxone, 0.02 mg, Intravenous, PRN    ondansetron, 8 mg, Oral, Q8H PRN    ondansetron, 4 mg, Intravenous, Q8H PRN    polyethylene glycol, 17 g, Oral, TID PRN    senna-docusate 8.6-50 mg, 2 tablet, Oral, BID PRN    simethicone, 1 tablet, Oral, QID PRN    sodium chloride 0.9%, 10 mL, Intravenous, PRN    Calorie Containing IV Medications: no significant kcals from medications at this time    Recent Labs   Lab 04/23/24  0518 04/29/24  0451    140   K 4.1 4.0   CALCIUM 9.1 9.0   CHLORIDE 102 105   CO2 30 27   BUN 20.2 20.5   CREATININE 0.82 0.85   EGFRNORACEVR >60 >60   GLUCOSE 96 93   WBC 7.74 6.09   HGB 10.6* 10.5*   HCT 32.5* 32.3*     Nutrition Orders:  Diet Soft & Bite Sized (IDDSI Level 6) Mildly Thick Liquids (IDDSI Level 2)  Dietary nutrition supplements Boost Original Nutritional Drink - Chocolate; BID    Appetite/Oral Intake: good/100% of meals  Factors Affecting Nutritional Intake: chewing difficulty, decreased  appetite, and difficulty/impaired swallowing  Social Needs Impacting Access to Food: none identified  Food/Yazdanism/Cultural Preferences: none reported  Food Allergies: none reported  Last Bowel Movement: 04/29/24  Wound(s):      Comments  Per MD notes. Patient complained of difficulty swallowing upon admission, he was evaluated by speech therapy and needs to remain NPO until a barium swallow is performed.  Does affirm that he has been compensating for difficulty swallowing at home for many years and has self modified his diet to easy to chew foods.     04/29/24: Pt and family reports intake is good. Pt educated on brand of thickened liquids, mildly thick liquids and wear to purchased with family. Will monitor.  PO intake 100% of meal.      04/26/24: Pt and family present. Pt intake is good, ate well at lunch time. Pt had esophagram today, noted. Pt still does like thickened liquids. Continue to encourage good PO intake and hydration.     04/22/24: Pt intake improved. Pt stated enjoying the eggs this am. LBM noted. Will monitor. Pt doesn't like thickened liquids. Encourage compliance with thickened liquids. Family present.     04/18/24: SLP recommended easy to chew, soft and bite sized gravy on meats mildly thick liquids. Delivered lunch tray to patient. Family present during rounds. Pt and family reported decrease intake over week. Pt has good appetite at home with access to meals. Pt follow easy to chew diet. Discussed diet change with family and patient. Pt on thickened liquids. Provide education on how to thickened to mildly thick liquids consistency with family. Discussed food preferences. Pt also report weight loss of 10#. Pt drinks boost at home. Provide recs to MD for addition nutrition since, decrease intake x one week.      04/17/24: Pt c/o of difficulty swallowing. Pt NPO until barium swallow. Per MD pt follow easy to chew foods at home. Will follow up with SLP on diet modifications. Will complete NFPE  "on next follow up.     Anthropometrics    Height: 5' 9" (175.3 cm),    Last Weight: 62.4 kg (137 lb 9.1 oz) (24 0500), Weight Method: Bed Scale  BMI (Calculated): 20.3  BMI Classification: normal (BMI 18.5-24.9)        Ideal Body Weight (IBW), Male: 160 lb     % Ideal Body Weight, Male (lb): 86.67 %                 Usual Body Weight (UBW), k.4 kg  % Usual Body Weight: 94.23  % Weight Change From Usual Weight: -5.97 %  Usual Weight Provided By: patient    Wt Readings from Last 5 Encounters:   24 62.4 kg (137 lb 9.1 oz)   24 70.3 kg (155 lb)     Weight Change(s) Since Admission: -3.8 kg wt loss. Will  monitor intake  Wt Readings from Last 1 Encounters:   24 0500 62.4 kg (137 lb 9.1 oz)   24 1416 62.9 kg (138 lb 10.7 oz)   24 0508 62.9 kg (138 lb 10.7 oz)   24 1452 66.2 kg (145 lb 15.1 oz)   24 1541 66.2 kg (145 lb 15.1 oz)   24 1100 66.2 kg (145 lb 15.1 oz)   Admit Weight: 66.2 kg (145 lb 15.1 oz) (24 1100), Weight Method: Bed Scale    Estimated Needs    Kcal Needs: 1,872 kcal (30 kcal/kg/CBW)    Protein Needs: 75 gm (1.2 gm/kg/CBW)    Fluid Needs: 1,872 mL (1 mL/kcal)        Enteral Nutrition     Patient not receiving enteral nutrition at this time.    Parenteral Nutrition     Patient not receiving parenteral nutrition support at this time.    Evaluation of Received Nutrient Intake    Calories: meeting estimated needs  Protein: meeting estimated needs    Patient Education     Not applicable.    Nutrition Diagnosis     PES: Swallowing difficulty related to oropharyngeal dysphagia as evidence bydecreased mastication, a-p transfer, delayed swallow reflex, pharyngeal residue after the swallow. -valleculae, pyriform sinus, top of airway.  Laryngeal penetration with thin liquids   . (progressing)     PES: Moderate acute disease or injury related malnutrition related to acute illness as evidenced by less than 75% needs met for greater than 7 days, moderate fat " depletion, and moderate muscle depletion. (progressing)    Nutrition Interventions     Intervention(s): general/healthful diet, modified composition of meals/snacks, commercial beverage, and collaboration with other providers    Goal: Consume % of meals/snacks by follow-up. (progressing)  Goal: Consume % of meals/snacks by follow-up. (progressing)    Nutrition Goals & Monitoring     Dietitian will monitor: food and beverage intake, weight, weight change, electrolyte/renal panel, glucose/endocrine profile, and gastrointestinal profile  Discharge planning:  easy to chew soft bite sized meats with extra gravy mildly thick liquids with boost supplements diet with texture modifications per SLP  Nutrition Risk/Follow-Up: moderate (follow-up in 3-5 days)   Please consult if re-assessment needed sooner.

## 2024-04-29 NOTE — PT/OT/SLP PROGRESS
Physical Therapy Treatment Note           Name: Terence Tanner Jr.    : 1945 (79 y.o.)  MRN: [id number]           TREATMENT SUMMARY AND RECOMMENDATIONS:    PT Received On: 24  PT Start Time: 930     PT Stop Time: 955  PT Total Time (min): 25 min     Subjective Assessment:   No complaints  Lethargic    Awake, alert, cooperative  Uncooperative    Agitated x c/o pain    Appropriate  c/o fatigue    Confused  Treated at bedside     Emotionally labile  Treated in gym/dept.    Impulsive  Other:    Flat affect       Therapy Precautions:    Cognitive deficits  Spinal precautions    Collar - hard  Sternal precautions    Collar - soft   TLSO    Fall risk  LSO    Hip precautions - posterior  Knee immobilizer    Hip precautions - anterior  WBAT    Impaired communication  Partial weightbearing    Oxygen  TTWB    PEG tube  NWB    Visual deficits  Other:    Hearing deficits          Treatment Objectives:     Mobility Training:   Assist level Comments    Bed mobility     Transfer     Gait CGA Amb on firm surface with RW 80 ft    Sit to stand transitions CGA Sit-stand 5 reps with B UE use   Sitting balance     Standing balance      Wheelchair mobility     Car transfer     Other:          Therapeutic Exercise:   Exercise Sets Reps Comments   UBE 10 min  UBE with B LE use   B LE exer sitting  1 20 Ankle pumps, TKE, abd/add, knee lifts    B LE exer standing  1 10 B UE supported- abd/add, knee lifts              Additional Comments:  Pain noted during mobility     Assessment: Patient tolerated session fair    PT Plan:continue  Revisions made to plan of care: No    GOALS:   Multidisciplinary Problems       Physical Therapy Goals          Problem: Physical Therapy    Goal Priority Disciplines Outcome Goal Variances Interventions   Physical Therapy Goal     PT, PT/OT Progressing     Description: Goals to be met by: Discharge      Patient will increase functional independence with mobility by performin. Sit to  stand transfer with Modified [place]  2. Bed to chair transfer with Modified [place] using Rolling Walker  3. Gait  x at least 50 feet with Modified [place] and up to 175 feet with SBA using Rolling Walker.   4. Increased functional strength to 4/5 for R LE  5. Supine<>Sit with MOD I.                         Skilled PT Minutes Provided: 25   Communication with Treatment Team:     Equipment recommendations:       At end of treatment, patient remained:   Up in chair     Up in wheelchair in room    In bed    With alarm activated    Bed rails up    Call bell in reach     Family/friends present    Restraints secured properly    In bathroom with CNA/RN notified    Nurse aware   x In gym with therapist/tech    Other:

## 2024-04-29 NOTE — PT/OT/SLP PROGRESS
Occupational Therapy  Treatment    Name: Terence Tanner Jr.    : 1945 (79 y.o.)  MRN: [id number]           TREATMENT SUMMARY AND RECOMMENDATIONS:      OT Date of Treatment: 24  OT Start Time: 1358  OT Stop Time: 1421  OT Total Time (min): 23 min      Subjective Assessment:   No complaints  Lethargic   X Awake, alert, cooperative  Impulsive    Uncooperative   Flat affect    Agitated X c/o pain    Appropriate  c/o fatigue    Confused  Treated at bedside     Emotionally labile X Treated in gym/dept.      Other:        Therapy Precautions:    Cognitive deficits  Spinal precautions    Collar - hard  Sternal precautions    Collar - soft   TLSO   X Fall risk  LSO    Hip precautions - posterior  Knee immobilizer    Hip precautions - anterior X WBAT    Impaired communication  Partial weightbearing    Oxygen  TTWB    PEG tube  NWB    Visual deficits      Hearing deficits  X Other: ROMAT       Treatment Objectives:     Mobility Training:    Mobility task Assist level Comments    Bed mobility SPV EOB>supine   Transfer     Sit to stands transitions SBA From EOB level   Functional mobility SBA Pt ambulated ~100ft c RW. Slow kalpana   Sitting balance     Standing balance      Other:          Therapeutic Exercise:   Exercise Sets Reps Comments   UBE 2 5' F/B Mod resistance                       Assessment: Patient tolerated session well. Pt would continue to benefit from skilled OT services to improve pt's safety and independence with daily occupations, decrease caregiver burden, and reduce pt's risk of falls.     GOALS:   Multidisciplinary Problems       Occupational Therapy Goals          Problem: Occupational Therapy    Goal Priority Disciplines Outcome Interventions   Occupational Therapy Goal     OT, PT/OT Progressing    Description: Goals to be met by: 2024     Patient will increase functional independence with ADLs by performing:    LE Dressing with Stand-by Assistance.  Grooming while standing at sink with  Stand-by Assistance.  Toileting from toilet with Stand-by Assistance for hygiene and clothing management.   Toilet transfer to toilet with Stand-by Assistance.                         Recommendations:     Discharge Equipment Recommendations:  none     Plan:     Patient to be seen  (5-6x/week (QD-BID)) to address the above listed problems via self-care/home management, neuromuscular re-education, therapeutic activities, therapeutic exercises  Plan of Care Expires: 05/17/24  Plan of Care Reviewed with: patient, spouse  Revisions made to plan of care: No      Skilled OT Minutes Provided: 23  Communication with Treatment Team:     Equipment recommendations:       At end of treatment, patient remained:   Up in chair     Up in wheelchair in room    In bed    With alarm activated    Bed rails up    Call bell in reach     Family/friends present    Restraints secured properly    In bathroom with CNA/RN notified   X In gym with PT/PTA/tech    Nurse aware    Other:

## 2024-04-29 NOTE — PLAN OF CARE
Problem: Adult Inpatient Plan of Care  Goal: Plan of Care Review  Outcome: Progressing  Flowsheets (Taken 4/28/2024 2217)  Plan of Care Reviewed With:   patient   spouse  Goal: Patient-Specific Goal (Individualized)  Outcome: Progressing  Flowsheets (Taken 4/28/2024 2217)  Anxieties, Fears or Concerns: concered about having multiple bowel movements  Individualized Care Needs: pain mgt, safety  Goal: Absence of Hospital-Acquired Illness or Injury  Outcome: Progressing  Intervention: Identify and Manage Fall Risk  Flowsheets (Taken 4/28/2024 2217)  Safety Promotion/Fall Prevention:   assistive device/personal item within reach   bed alarm set   family to remain at bedside   side rails raised x 3  Intervention: Prevent Skin Injury  Flowsheets (Taken 4/28/2024 2217)  Body Position: position changed independently  Skin Protection: incontinence pads utilized  Device Skin Pressure Protection: absorbent pad utilized/changed  Intervention: Prevent and Manage VTE (Venous Thromboembolism) Risk  Flowsheets (Taken 4/28/2024 2217)  VTE Prevention/Management: bleeding precations maintained  Intervention: Prevent Infection  Flowsheets (Taken 4/28/2024 2217)  Infection Prevention:   cohorting utilized   personal protective equipment utilized   rest/sleep promoted  Goal: Optimal Comfort and Wellbeing  Outcome: Progressing  Intervention: Monitor Pain and Promote Comfort  Flowsheets (Taken 4/28/2024 2217)  Pain Management Interventions:   care clustered   pain management plan reviewed with patient/caregiver   quiet environment facilitated  Intervention: Provide Person-Centered Care  Flowsheets (Taken 4/28/2024 2217)  Trust Relationship/Rapport:   care explained   choices provided   emotional support provided   empathic listening provided   questions encouraged   reassurance provided   thoughts/feelings acknowledged   questions answered  Goal: Readiness for Transition of Care  Outcome: Progressing  Intervention: Mutually Develop  Transition Plan  Flowsheets (Taken 4/28/2024 2217)  Equipment Currently Used at Home:   bedside commode   walker, rolling  Transportation Anticipated: family or friend will provide  Who are your caregiver(s) and their phone number(s)?: Amelia [phone] (spouse)  Communicated LIZABETH with patient/caregiver: Date not available/Unable to determine  Do you expect to return to your current living situation?: Yes  Do you have help at home or someone to help you manage your care at home?: Yes  Readmission within 30 days?: No  Do you currently have service(s) that help you manage your care at home?: No  Is the pt/caregiver preference to resume services with current agency: No     Problem: Infection  Goal: Absence of Infection Signs and Symptoms  Outcome: Progressing  Intervention: Prevent or Manage Infection  Flowsheets (Taken 4/28/2024 2217)  Fever Reduction/Comfort Measures:   lightweight bedding   lightweight clothing  Infection Management: aseptic technique maintained  Isolation Precautions: precautions maintained     Problem: Fall Injury Risk  Goal: Absence of Fall and Fall-Related Injury  Outcome: Progressing  Intervention: Identify and Manage Contributors  Flowsheets (Taken 4/28/2024 2217)  Self-Care Promotion:   independence encouraged   BADL personal objects within reach   BADL personal routines maintained  Medication Review/Management: medications reviewed  Intervention: Promote Injury-Free Environment  Flowsheets (Taken 4/28/2024 2217)  Safety Promotion/Fall Prevention:   assistive device/personal item within reach   bed alarm set   family to remain at bedside   side rails raised x 3     Problem: Swallowing Impairment  Goal: Optimal Eating and Swallowing Without Aspiration  Outcome: Progressing  Intervention: Optimize Eating and Swallowing  Flowsheets (Taken 4/28/2024 2217)  Aspiration Precautions:   awake/alert before oral intake   liquids thickened   oral hygiene care promoted  Swallowing Interventions: Dysphagia:    food and liquid intake alternated   mouth checked for residue/pocketing   small bites/sips encouraged  Swallowing Method:   double swallow encouraged   throat clear/extra swallow     Problem: Hypertension Comorbidity  Goal: Blood Pressure in Desired Range  Outcome: Progressing  Intervention: Maintain Blood Pressure Management  Flowsheets (Taken 4/28/2024 2217)  Syncope Management: legs elevated  Medication Review/Management: medications reviewed     Problem: Osteoarthritis Comorbidity  Goal: Maintenance of Osteoarthritis Symptom Control  Outcome: Progressing  Intervention: Maintain Osteoarthritis Symptom Control  Flowsheets (Taken 4/28/2024 2217)  Assistive Device Utilized:   gait belt   walker  Activity Management: Rolling - L1  Medication Review/Management: medications reviewed     Problem: Pain Chronic (Persistent) (Comorbidity Management)  Goal: Acceptable Pain Control and Functional Ability  Outcome: Progressing  Intervention: Develop Pain Management Plan  Flowsheets (Taken 4/28/2024 2217)  Pain Management Interventions:   care clustered   pain management plan reviewed with patient/caregiver   quiet environment facilitated  Intervention: Manage Persistent Pain  Flowsheets (Taken 4/28/2024 2217)  Sleep/Rest Enhancement:   awakenings minimized   regular sleep/rest pattern promoted   relaxation techniques promoted   family presence promoted   noise level reduced  Bowel Elimination Promotion: adequate fluid intake promoted  Medication Review/Management: medications reviewed  Intervention: Optimize Psychosocial Wellbeing  Flowsheets (Taken 4/28/2024 2217)  Supportive Measures: active listening utilized  Diversional Activities: television  Family/Support System Care: support provided     Problem: Skin Injury Risk Increased  Goal: Skin Health and Integrity  Outcome: Progressing  Intervention: Optimize Skin Protection  Flowsheets (Taken 4/28/2024 2217)  Pressure Reduction Techniques: heels elevated off bed  Pressure  Reduction Devices: positioning supports utilized  Skin Protection: incontinence pads utilized  Activity Management: Rolling - L1  Head of Bed (HOB) Positioning: HOB at 30-45 degrees  Intervention: Promote and Optimize Oral Intake  Flowsheets (Taken 4/28/2024 2217)  Oral Nutrition Promotion:   calorie-dense foods provided   calorie-dense liquids provided  Nutrition Interventions: food preferences provided

## 2024-04-29 NOTE — PROGRESS NOTES
"[facility]  [facility] MEDICINE ~ PROGRESS NOTE    CHIEF COMPLAINT     Hospital follow up for right femur fracture    HOSPITAL COURSE     79 years old male with a past medical history of rheumatoid arthritis, osteoarthritis, osteopenia, hx of partial colon resection ("black spot on colon" - reportedly non cancerous), and newly diagnosed HTN who initially presented to [facility] s/p fall at home resulting in a right intertrochanteric femur fracture. Patient subsequently underwent IM nailing of right intertrochanteric femur fracture on 04/11/2024 with Dr. Tatum.  Patient was noted to have elevated blood pressure readings during admission therefore was initiated on lisinopril, currently receiving 40 mg daily. Patient was transferred to our swing unit for continued therapy.  On exam today, patient denies any complaints.  He does endorse an episode of choking while eating breakfast this morning but contributes this to the dry food.  Will obtain SLP eval prior to initiating diet.  At baseline, patient does use an assistive device to walk, mainly Rollator.  He also has a powered wheelchair, a walker, a ramp at home, and lives with his wife who does assist with ADLs as needed.     Today:  Patient reports having another BM this morning. Patient has ortho appointment tomorrow.    OBJECTIVE/PHYSICAL EXAM     VITAL SIGNS (MOST RECENT):  Temp: 97.6 °F (36.4 °C) (04/29/24 0737)  Pulse: 90 (04/29/24 0907)  Resp: 18 (04/29/24 0907)  BP: (!) 147/78 (04/29/24 0737)  SpO2: 97 % (04/29/24 0907) VITAL SIGNS (24 HOUR RANGE):  Temp:  [97.6 °F (36.4 °C)-98.2 °F (36.8 °C)] 97.6 °F (36.4 °C)  Pulse:  [80-90] 90  Resp:  [18] 18  SpO2:  [97 %-98 %] 97 %  BP: (126-148)/(67-78) 147/78     GENERAL: In no acute distress, afebrile  HEENT:  Atraumatic, normocephalic, moist mucous membranes  CHEST: Clear to auscultation bilaterally  HEART: S1, S2, no appreciable murmur  ABDOMEN: Soft, nontender, BS +  MSK: Warm, no lower " extremity edema, RA changes throughout   NEUROLOGIC: Alert and oriented x4, moving all extremities   INTEGUMENTARY: Right hip incision without erythema or drainage, staples in place   PSYCHIATRY: Appropriate mood and affect     ASSESSMENT/PLAN     R intertrochanteric femur fracture  Mechanical Fall  Underwent IM nailing of right intertrochanteric femur fracture on 04/11/2024 with Dr. Tatum  WBAT to RLE, full ROM   Lovenox for DVT prevention, and orthopedics recommends aspirin 81 mg b.i.d. upon discharge, but patient is allergic.  PT/OT  Follow-up with Dr. Tatum on 04/30/2024 at 0800, patient can perform car transfers per Physical therapy  Pain control as needed - continue prn Tylenol No. 3, lidocaine patch    Dysphagia  Small hiatal hernia with Schatzki's ring  MBS on 04/18/2024 - SLP recommended soft and bite size diet with gravy and mildly thick liquids  Esophagram revealed small sliding hiatal hernia with subtle Schatzki's ring   Protonix daily for 8 weeks     Post-op anemia   Normocytic anemia  Iron panel more consistent with anemia of chronic disease   Folate low normal, start supplementation     HTN   Currently receiving Lisinopril 40mg QD     Rheumatoid arthritis, osteoarthritis  Continue home methylprednisone 4 mg daily  PPI during admission    Klebsiella pneumoniae and Klebsiella aerogenes +sputum   Cipro PO x5 days      DVT prophylaxis: Lovenox   Anticipated discharge and disposition: Continue PT/OT  __________________________________________________________________________    LABS/MICRO/MEDS/DIAGNOSTICS     LABS  Recent Labs     04/29/24  0451      K 4.0   CHLORIDE 105   CO2 27   BUN 20.5   CREATININE 0.85   GLUCOSE 93   CALCIUM 9.0     Recent Labs     04/29/24  0451   WBC 6.09   RBC 3.43*   HCT 32.3*   MCV 94.2*        MICROBIOLOGY  Microbiology Results (last 7 days)       Procedure Component Value Units Date/Time    Respiratory Culture [[id number]]  (Abnormal)  (Susceptibility) Collected:  "04/22/24 1926    Order Status: Completed Specimen: Sputum, Induced Updated: 04/26/24 1043     Respiratory Culture Many Klebsiella pneumoniae      Many KLEBSIELLA AEROGENES     Comment: with normal respiratory stuart        GRAM STAIN Quality 3+      Few Gram positive cocci      Rare Yeast             MEDICATIONS  Current Facility-Administered Medications   Medication Dose Route Frequency    calcium-vitamin D3  1 tablet Oral BID    enoxparin  40 mg Subcutaneous Daily    folic acid  1 mg Oral Daily    LIDOcaine  1 patch Transdermal Q24H    LIDOcaine  1 patch Transdermal Q24H    lisinopriL  40 mg Oral Daily    methocarbamoL  750 mg Oral TID    methylPREDNISolone  4 mg Oral Daily    pantoprazole  40 mg Oral Daily         INFUSIONS  Current Facility-Administered Medications   Medication Dose Route Frequency Last Rate Last Admin        DIAGNOSTIC TESTS  FL Esophagram Complete   Final Result      Small sliding hiatal hernia with subtle Schatzki's ring         Electronically signed by: Trisha Lowery   Date:    04/25/2024   Time:    11:20      X-Ray Abdomen Flat And Erect   Final Result      Moderate colonic fecal loading.  Nonspecific bowel gas pattern.         Electronically signed by: Orion Pettit   Date:    04/22/2024   Time:    15:05      Fl Modified Barium Swallow Speech   Final Result         No results found for: "EF"     NUTRITION STATUS  Patient meets ASPEN criteria for moderate malnutrition of acute illness or injury per RD assessment as evidenced by:  Energy Intake (Malnutrition): less than 75% for greater than 7 days  Weight Loss (Malnutrition): other (see comments) (-5.97% in recent months. Reported 10# wt loss pt stated)  Subcutaneous Fat (Malnutrition): moderate depletion  Muscle Mass (Malnutrition): moderate depletion           A minimum of two characteristics is recommended for diagnosis of either severe or non-severe malnutrition.     Case related differential diagnoses have been reviewed; assessment and " plan has been documented. I have personally reviewed the labs and test results that are currently available; I have reviewed the patients medication list. I have reviewed the consulting providers recommendations. I have reviewed or attempted to review medical records based upon their availability.  All of the patient's and/or family's questions have been addressed and answered to the best of my ability.  I will continue to monitor closely and make adjustments to medical management as needed.  This document was created using M*Modal Fluency Direct.  Transcription errors may have been made.  Please contact me if any questions may rise regarding documentation to clarify transcription.      JOEY Brooks   Internal Medicine  Department of Hospital Medicine  [facility]

## 2024-04-29 NOTE — PLAN OF CARE
Problem: Adult Inpatient Plan of Care  Goal: Plan of Care Review  Outcome: Progressing  Goal: Patient-Specific Goal (Individualized)  Outcome: Progressing  Flowsheets (Taken 4/29/2024 1013)  Anxieties, Fears or Concerns: patient stated he does not want anymore stool softners  Individualized Care Needs: pain management, ecourage pt/ot, safety with mobility  Goal: Absence of Hospital-Acquired Illness or Injury  Outcome: Progressing  Intervention: Identify and Manage Fall Risk  Flowsheets (Taken 4/29/2024 1013)  Safety Promotion/Fall Prevention:   assistive device/personal item within reach   bed alarm set   nonskid shoes/socks when out of bed   side rails raised x 2  Goal: Optimal Comfort and Wellbeing  Outcome: Progressing  Goal: Readiness for Transition of Care  Outcome: Progressing     Problem: Infection  Goal: Absence of Infection Signs and Symptoms  Outcome: Progressing     Problem: Fall Injury Risk  Goal: Absence of Fall and Fall-Related Injury  Outcome: Progressing  Intervention: Identify and Manage Contributors  Flowsheets (Taken 4/29/2024 1013)  Self-Care Promotion:   independence encouraged   adaptive equipment use encouraged   BADL personal objects within reach  Medication Review/Management: medications reviewed     Problem: Swallowing Impairment  Goal: Optimal Eating and Swallowing Without Aspiration  Outcome: Progressing     Problem: Hypertension Comorbidity  Goal: Blood Pressure in Desired Range  Outcome: Progressing  Intervention: Maintain Blood Pressure Management  Flowsheets (Taken 4/29/2024 1013)  Medication Review/Management: medications reviewed     Problem: Osteoarthritis Comorbidity  Goal: Maintenance of Osteoarthritis Symptom Control  Outcome: Progressing     Problem: Pain Chronic (Persistent) (Comorbidity Management)  Goal: Acceptable Pain Control and Functional Ability  Outcome: Progressing  Intervention: Develop Pain Management Plan  Flowsheets (Taken 4/29/2024 1013)  Pain Management  Interventions:   care clustered   premedicated for activity   position adjusted   pain management plan reviewed with patient/caregiver     Problem: Skin Injury Risk Increased  Goal: Skin Health and Integrity  Outcome: Progressing  Intervention: Optimize Skin Protection  Flowsheets (Taken 4/29/2024 1013)  Pressure Reduction Techniques: frequent weight shift encouraged  Skin Protection:   incontinence pads utilized   skin sealant/moisture barrier applied  Activity Management: Rolling - L1  Head of Bed (HOB) Positioning: HOB at 20-30 degrees  Intervention: Promote and Optimize Oral Intake  Flowsheets (Taken 4/29/2024 1013)  Oral Nutrition Promotion:   adaptive equipment use encouraged   calorie-dense foods provided

## 2024-04-29 NOTE — PLAN OF CARE
[facility]  Discharge Reassessment    Primary Care Provider: No, Primary Doctor    Expected Discharge Date: 4/30/2024    Reassessment (most recent)       Discharge Reassessment - 04/29/24 1820          Discharge Reassessment    Assessment Type Discharge Planning Reassessment     Did the patient's condition or plan change since previous assessment? No     Discharge Plan discussed with: Spouse/sig other     Name(s) and Number(s) Amelia      Communicated LIZABETH with patient/caregiver Date not available/Unable to determine     Discharge Plan A Home with family;Home Health     DME Needed Upon Discharge  none     Transition of Care Barriers None     Why the patient remains in the hospital Requires continued medical care        Post-Acute Status    Post-Acute Authorization Home Health     Home Health Status Pending medical clearance/testing     Hospital Resources/Appts/Education Provided Provided patient/caregiver with written discharge plan information     Discharge Delays None known at this time

## 2024-04-29 NOTE — PT/OT/SLP PROGRESS
Physical Therapy Treatment Note           Name: Terence Tanner Jr.    : 1945 (79 y.o.)  MRN: [id number]           TREATMENT SUMMARY AND RECOMMENDATIONS:    PT Received On: 24  PT Start Time: 1330     PT Stop Time: 1340  PT Total Time (min): 10 min     Subjective Assessment:   No complaints  Lethargic    Awake, alert, cooperative  Uncooperative    Agitated  c/o pain    Appropriate  c/o fatigue    Confused x Treated at bedside     Emotionally labile  Treated in gym/dept.    Impulsive  Other:    Flat affect       Therapy Precautions:    Cognitive deficits  Spinal precautions    Collar - hard  Sternal precautions    Collar - soft   TLSO    Fall risk  LSO    Hip precautions - posterior  Knee immobilizer    Hip precautions - anterior  WBAT    Impaired communication  Partial weightbearing    Oxygen  TTWB    PEG tube  NWB    Visual deficits  Other:    Hearing deficits          Treatment Objectives:     Mobility Training:   Assist level Comments    Bed mobility     Transfer CGA BSC commode transfer with RW    Gait     Sit to stand transitions     Sitting balance     Standing balance      Wheelchair mobility     Car transfer     Other:          Therapeutic Exercise:   Exercise Sets Reps Comments                               Additional Comments:  Attempted treatment but pt requested BSC due to BM - pt having frequent BM since Saturday    Assessment: Patient tolerated session fair    PT Plan: continue  Revisions made to plan of care: No    GOALS:   Multidisciplinary Problems       Physical Therapy Goals          Problem: Physical Therapy    Goal Priority Disciplines Outcome Goal Variances Interventions   Physical Therapy Goal     PT, PT/OT Progressing     Description: Goals to be met by: Discharge      Patient will increase functional independence with mobility by performin. Sit to stand transfer with Modified [place]  2. Bed to chair transfer with Modified [place] using Rolling Walker  3.  Gait  x at least 50 feet with Modified [place] and up to 175 feet with SBA using Rolling Walker.   4. Increased functional strength to 4/5 for R LE  5. Supine<>Sit with MOD I.                         Skilled PT Minutes Provided: 10   Communication with Treatment Team:     Equipment recommendations:       At end of treatment, patient remained:   Up in chair     Up in wheelchair in room    In bed    With alarm activated    Bed rails up    Call bell in reach    x Family/friends present    Restraints secured properly    In bathroom with CNA/RN notified    Nurse aware    In gym with therapist/tech   x Other:BSC

## 2024-04-30 ENCOUNTER — OFFICE VISIT (OUTPATIENT)
Dept: ORTHOPEDICS | Facility: CLINIC | Age: 79
End: 2024-04-30
Payer: MEDICARE

## 2024-04-30 VITALS
DIASTOLIC BLOOD PRESSURE: 68 MMHG | WEIGHT: 136.69 LBS | SYSTOLIC BLOOD PRESSURE: 107 MMHG | HEART RATE: 97 BPM | BODY MASS INDEX: 20.24 KG/M2 | HEIGHT: 69 IN

## 2024-04-30 DIAGNOSIS — S72.141A CLOSED DISPLACED INTERTROCHANTERIC FRACTURE OF RIGHT FEMUR, INITIAL ENCOUNTER: Primary | ICD-10-CM

## 2024-04-30 DIAGNOSIS — S72.001A CLOSED FRACTURE OF RIGHT HIP, INITIAL ENCOUNTER: ICD-10-CM

## 2024-04-30 PROCEDURE — 92526 ORAL FUNCTION THERAPY: CPT

## 2024-04-30 PROCEDURE — 97110 THERAPEUTIC EXERCISES: CPT

## 2024-04-30 PROCEDURE — 3078F DIAST BP <80 MM HG: CPT | Mod: CPTII,,,

## 2024-04-30 PROCEDURE — 25000003 PHARM REV CODE 250: Performed by: PHYSICIAN ASSISTANT

## 2024-04-30 PROCEDURE — 97535 SELF CARE MNGMENT TRAINING: CPT

## 2024-04-30 PROCEDURE — 94760 N-INVAS EAR/PLS OXIMETRY 1: CPT

## 2024-04-30 PROCEDURE — 1160F RVW MEDS BY RX/DR IN RCRD: CPT | Mod: CPTII,,,

## 2024-04-30 PROCEDURE — 97530 THERAPEUTIC ACTIVITIES: CPT

## 2024-04-30 PROCEDURE — 63600175 PHARM REV CODE 636 W HCPCS: Performed by: STUDENT IN AN ORGANIZED HEALTH CARE EDUCATION/TRAINING PROGRAM

## 2024-04-30 PROCEDURE — 3288F FALL RISK ASSESSMENT DOCD: CPT | Mod: CPTII,,,

## 2024-04-30 PROCEDURE — 11000004 HC SNF PRIVATE

## 2024-04-30 PROCEDURE — 97116 GAIT TRAINING THERAPY: CPT | Mod: CQ

## 2024-04-30 PROCEDURE — 1159F MED LIST DOCD IN RCRD: CPT | Mod: CPTII,,,

## 2024-04-30 PROCEDURE — 25000003 PHARM REV CODE 250: Performed by: INTERNAL MEDICINE

## 2024-04-30 PROCEDURE — 3074F SYST BP LT 130 MM HG: CPT | Mod: CPTII,,,

## 2024-04-30 PROCEDURE — 1101F PT FALLS ASSESS-DOCD LE1/YR: CPT | Mod: CPTII,,,

## 2024-04-30 PROCEDURE — 63600175 PHARM REV CODE 636 W HCPCS: Performed by: PHYSICIAN ASSISTANT

## 2024-04-30 PROCEDURE — 99024 POSTOP FOLLOW-UP VISIT: CPT | Mod: ,,,

## 2024-04-30 PROCEDURE — 97110 THERAPEUTIC EXERCISES: CPT | Mod: CQ

## 2024-04-30 PROCEDURE — 25000003 PHARM REV CODE 250: Performed by: STUDENT IN AN ORGANIZED HEALTH CARE EDUCATION/TRAINING PROGRAM

## 2024-04-30 RX ORDER — GABAPENTIN 100 MG/1
100 CAPSULE ORAL
Status: ON HOLD | COMMUNITY
Start: 2021-06-14 | End: 2024-05-01 | Stop reason: HOSPADM

## 2024-04-30 RX ORDER — ALENDRONATE SODIUM 70 MG/1
70 TABLET ORAL
COMMUNITY
Start: 2023-11-07

## 2024-04-30 RX ORDER — GUAIFENESIN 1200 MG
325 TABLET, EXTENDED RELEASE 12 HR ORAL
COMMUNITY
Start: 2021-06-14

## 2024-04-30 RX ADMIN — METHOCARBAMOL 750 MG: 750 TABLET ORAL at 08:04

## 2024-04-30 RX ADMIN — PANTOPRAZOLE SODIUM 40 MG: 40 TABLET, DELAYED RELEASE ORAL at 09:04

## 2024-04-30 RX ADMIN — Medication 1 TABLET: at 08:04

## 2024-04-30 RX ADMIN — ENOXAPARIN SODIUM 40 MG: 40 INJECTION SUBCUTANEOUS at 05:04

## 2024-04-30 RX ADMIN — METHOCARBAMOL 750 MG: 750 TABLET ORAL at 09:04

## 2024-04-30 RX ADMIN — ACETAMINOPHEN AND CODEINE PHOSPHATE 1 TABLET: 300; 30 TABLET ORAL at 04:04

## 2024-04-30 RX ADMIN — METHOCARBAMOL 750 MG: 750 TABLET ORAL at 03:04

## 2024-04-30 RX ADMIN — FOLIC ACID 1 MG: 1 TABLET ORAL at 09:04

## 2024-04-30 RX ADMIN — METHYLPREDNISOLONE 4 MG: 4 TABLET ORAL at 09:04

## 2024-04-30 RX ADMIN — LIDOCAINE 5% 1 PATCH: 700 PATCH TOPICAL at 09:04

## 2024-04-30 RX ADMIN — LISINOPRIL 40 MG: 20 TABLET ORAL at 09:04

## 2024-04-30 RX ADMIN — Medication 1 TABLET: at 09:04

## 2024-04-30 NOTE — PT/OT/SLP PROGRESS
Physical Therapy Treatment Note           Name: Terence Tanner Jr.    : 1945 (79 y.o.)  MRN: [id number]           TREATMENT SUMMARY AND RECOMMENDATIONS:    PT Received On: 24  PT Start Time: 1300     PT Stop Time: 1325  PT Total Time (min): 25 min     Subjective Assessment:   No complaints  Lethargic    Awake, alert, cooperative  Uncooperative    Agitated x c/o pain    Appropriate  c/o fatigue    Confused  Treated at bedside     Emotionally labile  Treated in gym/dept.    Impulsive  Other:    Flat affect       Therapy Precautions:    Cognitive deficits  Spinal precautions    Collar - hard  Sternal precautions    Collar - soft   TLSO    Fall risk  LSO    Hip precautions - posterior  Knee immobilizer    Hip precautions - anterior  WBAT    Impaired communication  Partial weightbearing    Oxygen  TTWB    PEG tube  NWB    Visual deficits  Other:    Hearing deficits          Treatment Objectives:     Mobility Training:   Assist level Comments    Bed mobility     Transfer     Gait CGA Amb on firm surface with RW 80 ft    Sit to stand transitions CGA Sit-stand 5 reps x 2 with B UE use   Sitting balance     Standing balance      Wheelchair mobility     Car transfer     Other:          Therapeutic Exercise:   Exercise Sets Reps Comments   B LE exer sitting  1 20 Ankle pumps, TKE, abd/add, knee lifts    UBE 10 min  UBE with B LE use                    Additional Comments:  General pain noted  Planned on truck transfer training but wife not present in truck- will assess when able    Assessment: Patient tolerated session well    PT Plan: continue  Revisions made to plan of care: No    GOALS:   Multidisciplinary Problems       Physical Therapy Goals          Problem: Physical Therapy    Goal Priority Disciplines Outcome Goal Variances Interventions   Physical Therapy Goal     PT, PT/OT Progressing     Description: Goals to be met by: Discharge      Patient will increase functional independence with mobility by  performin. Sit to stand transfer with Modified [place]  2. Bed to chair transfer with Modified [place] using Rolling Walker  3. Gait  x at least 50 feet with Modified [place] and up to 175 feet with SBA using Rolling Walker.   4. Increased functional strength to 4/5 for R LE  5. Supine<>Sit with MOD I.                         Skilled PT Minutes Provided: 25   Communication with Treatment Team:     Equipment recommendations:       At end of treatment, patient remained:   Up in chair     Up in wheelchair in room    In bed    With alarm activated    Bed rails up    Call bell in reach     Family/friends present    Restraints secured properly    In bathroom with CNA/RN notified    Nurse aware   x In gym with therapist/tech    Other:

## 2024-04-30 NOTE — PROGRESS NOTES
"[facility]  [facility] MEDICINE ~ PROGRESS NOTE    CHIEF COMPLAINT     Hospital follow up for right femur fracture    HOSPITAL COURSE     79 years old male with a past medical history of rheumatoid arthritis, osteoarthritis, osteopenia, hx of partial colon resection ("black spot on colon" - reportedly non cancerous), and newly diagnosed HTN who initially presented to [facility] s/p fall at home resulting in a right intertrochanteric femur fracture. Patient subsequently underwent IM nailing of right intertrochanteric femur fracture on 04/11/2024 with Dr. Tatum.  Patient was noted to have elevated blood pressure readings during admission therefore was initiated on lisinopril, currently receiving 40 mg daily. Patient was transferred to our swing unit for continued therapy.  On exam today, patient denies any complaints.  He does endorse an episode of choking while eating breakfast this morning but contributes this to the dry food.  Will obtain SLP eval prior to initiating diet.  At baseline, patient does use an assistive device to walk, mainly Rollator.  He also has a powered wheelchair, a walker, a ramp at home, and lives with his wife who does assist with ADLs as needed.     Today:  Patient required a dose of Imodium yesterday.  He did have 1 small bowel movement this morning.  Ortho follow-up today removed staples.  They continue to recommend weight-bearing as tolerated and full range of motion as tolerated to the right lower extremity.  Patient requesting to be discharged home tomorrow if possible.    OBJECTIVE/PHYSICAL EXAM     VITAL SIGNS (MOST RECENT):  Temp: 98 °F (36.7 °C) (04/29/24 1939)  Pulse: 90 (04/29/24 1939)  Resp: 18 (04/30/24 0459)  BP: 123/65 (04/29/24 1939)  SpO2: 97 % (04/29/24 1939) VITAL SIGNS (24 HOUR RANGE):  Temp:  [98 °F (36.7 °C)] 98 °F (36.7 °C)  Pulse:  [89-97] 97  Resp:  [18] 18  SpO2:  [97 %] 97 %  BP: (107-123)/(65-68) 107/68     GENERAL: In no acute distress, " afebrile  HEENT:  Atraumatic, normocephalic, moist mucous membranes  CHEST: Clear to auscultation bilaterally  HEART: S1, S2, no appreciable murmur  ABDOMEN: Soft, nontender, BS +  MSK: Warm, no lower extremity edema, RA changes throughout   NEUROLOGIC: Alert and oriented x4, moving all extremities   INTEGUMENTARY: Right hip incision without erythema or drainage  PSYCHIATRY: Appropriate mood and affect     ASSESSMENT/PLAN     R intertrochanteric femur fracture  Mechanical Fall  Underwent IM nailing of right intertrochanteric femur fracture on 04/11/2024 with Dr. Tatmu  WBAT to RLE, full ROM   Lovenox for DVT prevention, and orthopedics recommends aspirin 81 mg b.i.d. upon discharge, but patient is allergic.  PT/OT  Seen by America Schaeffer PA-C  today - staple removed, continue weight-bearing as tolerated on full range of motion as tolerated to RLE   Pain control as needed - continue prn Tylenol No. 3, lidocaine patch  Possible discharge home this week     Dysphagia  Small hiatal hernia with Schatzki's ring  MBS on 04/18/2024 - SLP recommended soft and bite size diet with gravy and mildly thick liquids  Esophagram revealed small sliding hiatal hernia with subtle Schatzki's ring   Protonix daily for 8 weeks     Post-op anemia   Normocytic anemia  Iron panel more consistent with anemia of chronic disease   Folate low normal, start supplementation     HTN   Currently receiving Lisinopril 40mg QD     Rheumatoid arthritis, osteoarthritis  Continue home methylprednisone 4 mg daily  PPI during admission    Klebsiella pneumoniae and Klebsiella aerogenes +sputum   Completed Cipro PO x5 days      DVT prophylaxis: Lovenox     Anticipated discharge and disposition: Continue PT/OT, possible discharge home tomorrow or by the end of this week  __________________________________________________________________________    LABS/MICRO/MEDS/DIAGNOSTICS     LABS  Recent Labs     04/29/24  0451      K 4.0   CHLORIDE 105   CO2 27  "  BUN 20.5   CREATININE 0.85   GLUCOSE 93   CALCIUM 9.0     Recent Labs     04/29/24  0451   WBC 6.09   RBC 3.43*   HCT 32.3*   MCV 94.2*        MICROBIOLOGY  Microbiology Results (last 7 days)       Procedure Component Value Units Date/Time    Respiratory Culture [[id number]]  (Abnormal)  (Susceptibility) Collected: 04/22/24 1926    Order Status: Completed Specimen: Sputum, Induced Updated: 04/26/24 1043     Respiratory Culture Many Klebsiella pneumoniae      Many KLEBSIELLA AEROGENES     Comment: with normal respiratory stuart        GRAM STAIN Quality 3+      Few Gram positive cocci      Rare Yeast             MEDICATIONS  Current Facility-Administered Medications   Medication Dose Route Frequency    calcium-vitamin D3  1 tablet Oral BID    enoxparin  40 mg Subcutaneous Daily    folic acid  1 mg Oral Daily    LIDOcaine  1 patch Transdermal Q24H    LIDOcaine  1 patch Transdermal Q24H    lisinopriL  40 mg Oral Daily    methocarbamoL  750 mg Oral TID    methylPREDNISolone  4 mg Oral Daily    pantoprazole  40 mg Oral Daily         INFUSIONS  Current Facility-Administered Medications   Medication Dose Route Frequency Last Rate Last Admin        DIAGNOSTIC TESTS  FL Esophagram Complete   Final Result      Small sliding hiatal hernia with subtle Schatzki's ring         Electronically signed by: Trisha Lowery   Date:    04/25/2024   Time:    11:20      X-Ray Abdomen Flat And Erect   Final Result      Moderate colonic fecal loading.  Nonspecific bowel gas pattern.         Electronically signed by: Orion ePttit   Date:    04/22/2024   Time:    15:05      Fl Modified Barium Swallow Speech   Final Result         No results found for: "EF"     NUTRITION STATUS  Patient meets ASPEN criteria for moderate malnutrition of acute illness or injury per RD assessment as evidenced by:  Energy Intake (Malnutrition): less than 75% for greater than 7 days  Weight Loss (Malnutrition): other (see comments) (-5.97% in recent " months. Reported 10# wt loss pt stated)  Subcutaneous Fat (Malnutrition): moderate depletion  Muscle Mass (Malnutrition): moderate depletion           A minimum of two characteristics is recommended for diagnosis of either severe or non-severe malnutrition.     Case related differential diagnoses have been reviewed; assessment and plan has been documented. I have personally reviewed the labs and test results that are currently available; I have reviewed the patients medication list. I have reviewed the consulting providers recommendations. I have reviewed or attempted to review medical records based upon their availability.  All of the patient's and/or family's questions have been addressed and answered to the best of my ability.  I will continue to monitor closely and make adjustments to medical management as needed.  This document was created using M*Modal Fluency Direct.  Transcription errors may have been made.  Please contact me if any questions may rise regarding documentation to clarify transcription.      JOEY Brooks   Internal Medicine  Department of Hospital Medicine  [facility] - [facility]

## 2024-04-30 NOTE — PT/OT/SLP PROGRESS
Physical Therapy Treatment Note           Name: Terence Tanner Jr.    : 1945 (79 y.o.)  MRN: [id number]           TREATMENT SUMMARY AND RECOMMENDATIONS:    PT Received On: 24  PT Start Time: 920     PT Stop Time: 938  PT Total Time (min): 18 min     Subjective Assessment:   No complaints  Lethargic   x Awake, alert, cooperative  Uncooperative    Agitated x c/o pain    Appropriate x c/o fatigue    Confused  Treated at bedside     Emotionally labile x Treated in gym/dept.    Impulsive  Other:    Flat affect       Therapy Precautions:    Cognitive deficits  Spinal precautions    Collar - hard  Sternal precautions    Collar - soft   TLSO   x Fall risk  LSO    Hip precautions - posterior  Knee immobilizer    Hip precautions - anterior x WBAT    Impaired communication  Partial weightbearing    Oxygen  TTWB    PEG tube  NWB    Visual deficits  Other:    Hearing deficits          Treatment Objectives:     Mobility Training:   Assist level Comments    Bed mobility     Transfer SBA Getting out of car, car>WC, WC>toilet, WC>EOB without AD    Gait     Sit to stand transitions SBA From car, toilet and EOB   Sitting balance     Standing balance      Wheelchair mobility     Car transfer     Other:          Therapeutic Exercise:   Exercise Sets Reps Comments                               Additional Comments:  PT assisted pt getting out of car and to bathroom following return from ortho follow up. Pt reports wanting to go home this week they feel ready.     Assessment: Patient tolerated session fair, impacted by pain. Ready for DC this week.     PT Plan: continue per POC- will need to focus on DC planning.     Revisions made to plan of care: No    GOALS:   Multidisciplinary Problems       Physical Therapy Goals          Problem: Physical Therapy    Goal Priority Disciplines Outcome Goal Variances Interventions   Physical Therapy Goal     PT, PT/OT Progressing     Description: Goals to be met by: Discharge       Patient will increase functional independence with mobility by performin. Sit to stand transfer with Modified [place]  2. Bed to chair transfer with Modified [place] using Rolling Walker  3. Gait  x at least 50 feet with Modified [place] and up to 175 feet with SBA using Rolling Walker.   4. Increased functional strength to 4/5 for R LE  5. Supine<>Sit with MOD I.                         Skilled PT Minutes Provided: 15   Communication with Treatment Team:     Equipment recommendations:       At end of treatment, patient remained:   Up in chair     Up in wheelchair in room   x In bed - sitting EOB    With alarm activated    Bed rails up    Call bell in reach    x Family/friends present    Restraints secured properly    In bathroom with CNA/RN notified    Nurse aware    In gym with therapist/tech    Other:

## 2024-04-30 NOTE — PT/OT/SLP PROGRESS
Name: Terence Tanner JrIvan    : 1945 (79 y.o.)  MRN: [id number]           Patient Unavailable      Patient unable to be seen at this time secondary to: Pt out of building this AM for ortho f/u. OT plans to f/u later today to continue OT POC.

## 2024-04-30 NOTE — PLAN OF CARE
Patient requesting discharge tomorrow. Referral sent to [facility] via [facility]. [place] of choice signed

## 2024-04-30 NOTE — PT/OT/SLP PROGRESS
Occupational Therapy  Treatment    Name: Terence Tanner Jr.    : 1945 (79 y.o.)  MRN: [id number]           TREATMENT SUMMARY AND RECOMMENDATIONS:      OT Date of Treatment: 24  OT Start Time: 1330  OT Stop Time: 1400  OT Total Time (min): 30 min      Subjective Assessment:   No complaints  Lethargic   X Awake, alert, cooperative  Impulsive    Uncooperative   Flat affect    Agitated X c/o pain    Appropriate  c/o fatigue    Confused X Treated at bedside     Emotionally labile X Treated in gym/dept.      Other:        Therapy Precautions:    Cognitive deficits  Spinal precautions    Collar - hard  Sternal precautions    Collar - soft   TLSO    Fall risk  LSO    Hip precautions - posterior  Knee immobilizer    Hip precautions - anterior X WBAT    Impaired communication  Partial weightbearing    Oxygen  TTWB    PEG tube  NWB    Visual deficits      Hearing deficits  X Other: ROMAT       Treatment Objectives:     Mobility Training:    Mobility task Assist level Comments    Bed mobility SBA EOB>supine   Transfer     Sit to stands transitions     Functional mobility     Sitting balance     Standing balance      Other:        ADL Training:    ADL Assist level Comments    Feeding     Grooming/hygiene     Bathing     Upper body dressing     Lower body dressing     Toileting Min A Pt demonstrated ability to manage clothing up/down. Assistance needed for hygiene mgmt.   Toilet transfer SBA Pt ambulated short distances in room c RW w/c>toilet>EOB   Adaptive equipment training     Other:           Therapeutic Exercise:   Exercise Sets Reps Comments   UBE 2 5' F/b Mod resistance   UB strengthening 1 30 With 3# dowel, pt perf Shoulder press, chest press, straight arm raises, bicep curls, forward rows, backwards rows. Min length r/bs PRN.                    Assessment: Patient tolerated session well. Pt demonstrates improvements in toilet t/f and UB strength. Pt would continue to benefit from skilled OT services to improve  pt's safety and independence with daily occupations, decrease caregiver burden, and reduce pt's risk of falls.     GOALS:   Multidisciplinary Problems       Occupational Therapy Goals          Problem: Occupational Therapy    Goal Priority Disciplines Outcome Interventions   Occupational Therapy Goal     OT, PT/OT Progressing    Description: Goals to be met by: 5/17/2024     Patient will increase functional independence with ADLs by performing:    LE Dressing with Stand-by Assistance.  Grooming while standing at sink with Stand-by Assistance.  Toileting from toilet with Stand-by Assistance for hygiene and clothing management.   Toilet transfer to toilet with Stand-by Assistance.                         Recommendations:     Discharge Equipment Recommendations:  none     Plan:     Patient to be seen  (5-6x/week (QD-BID)) to address the above listed problems via self-care/home management, neuromuscular re-education, therapeutic activities, therapeutic exercises  Plan of Care Expires: 05/17/24  Plan of Care Reviewed with: patient, spouse  Revisions made to plan of care: No      Skilled OT Minutes Provided: 30  Communication with Treatment Team:     Equipment recommendations:       At end of treatment, patient remained:   Up in chair     Up in wheelchair in room   X In bed   X With alarm activated   X Bed rails up   X Call bell in reach     Family/friends present    Restraints secured properly    In bathroom with CNA/RN notified    In gym with PT/PTA/tech    Nurse aware    Other:

## 2024-04-30 NOTE — PLAN OF CARE
Problem: Adult Inpatient Plan of Care  Goal: Plan of Care Review  Outcome: Progressing  Flowsheets (Taken 4/29/2024 1951)  Plan of Care Reviewed With:   patient   spouse  Goal: Patient-Specific Goal (Individualized)  Outcome: Progressing  Flowsheets (Taken 4/29/2024 1951)  Anxieties, Fears or Concerns: pt concern about bowel movements  Individualized Care Needs: safety, pain mgt  Goal: Absence of Hospital-Acquired Illness or Injury  Outcome: Progressing  Intervention: Identify and Manage Fall Risk  Flowsheets (Taken 4/29/2024 1951)  Safety Promotion/Fall Prevention:   assistive device/personal item within reach   bed alarm set   nonskid shoes/socks when out of bed   side rails raised x 3  Intervention: Prevent Skin Injury  Flowsheets (Taken 4/29/2024 1951)  Body Position: position changed independently  Skin Protection: incontinence pads utilized  Device Skin Pressure Protection: absorbent pad utilized/changed  Intervention: Prevent and Manage VTE (Venous Thromboembolism) Risk  Flowsheets (Taken 4/29/2024 1951)  VTE Prevention/Management: bleeding precations maintained  Intervention: Prevent Infection  Flowsheets (Taken 4/29/2024 1951)  Infection Prevention:   cohorting utilized   personal protective equipment utilized   rest/sleep promoted  Goal: Optimal Comfort and Wellbeing  Outcome: Progressing  Intervention: Monitor Pain and Promote Comfort  Flowsheets (Taken 4/29/2024 1951)  Pain Management Interventions:   care clustered   pain management plan reviewed with patient/caregiver  Intervention: Provide Person-Centered Care  Flowsheets (Taken 4/29/2024 1951)  Trust Relationship/Rapport:   care explained   choices provided   emotional support provided   empathic listening provided   questions answered   questions encouraged   reassurance provided   thoughts/feelings acknowledged  Goal: Readiness for Transition of Care  Outcome: Progressing  Intervention: Mutually Develop Transition Plan  Flowsheets (Taken  4/29/2024 1951)  Equipment Currently Used at Home:   bedside commode   walker, rolling  Transportation Anticipated: family or friend will provide  Who are your caregiver(s) and their phone number(s)?: [phone] Amelia (spouse)  Communicated LIZABETH with patient/caregiver: Date not available/Unable to determine  Do you expect to return to your current living situation?: Yes  Do you have help at home or someone to help you manage your care at home?: Yes  Readmission within 30 days?: No  Do you currently have service(s) that help you manage your care at home?: No  Is the pt/caregiver preference to resume services with current agency: No     Problem: Infection  Goal: Absence of Infection Signs and Symptoms  Outcome: Progressing  Intervention: Prevent or Manage Infection  Flowsheets (Taken 4/29/2024 1951)  Fever Reduction/Comfort Measures:   lightweight bedding   lightweight clothing  Infection Management: aseptic technique maintained  Isolation Precautions: protective     Problem: Fall Injury Risk  Goal: Absence of Fall and Fall-Related Injury  Outcome: Progressing  Intervention: Identify and Manage Contributors  Flowsheets (Taken 4/29/2024 1951)  Self-Care Promotion:   independence encouraged   adaptive equipment use encouraged  Medication Review/Management: medications reviewed  Intervention: Promote Injury-Free Environment  Flowsheets (Taken 4/29/2024 1951)  Safety Promotion/Fall Prevention:   assistive device/personal item within reach   bed alarm set   nonskid shoes/socks when out of bed   side rails raised x 3     Problem: Swallowing Impairment  Goal: Optimal Eating and Swallowing Without Aspiration  Outcome: Progressing  Intervention: Optimize Eating and Swallowing  Flowsheets (Taken 4/29/2024 1951)  Aspiration Precautions:   awake/alert before oral intake   upright posture maintained  Swallowing Interventions: Dysphagia:   food and liquid intake alternated   small bites/sips encouraged  Swallowing Method:   double  swallow encouraged   throat clear/extra swallow     Problem: Hypertension Comorbidity  Goal: Blood Pressure in Desired Range  Outcome: Progressing  Intervention: Maintain Blood Pressure Management  Flowsheets (Taken 4/29/2024 1951)  Syncope Management: legs elevated  Medication Review/Management: medications reviewed     Problem: Osteoarthritis Comorbidity  Goal: Maintenance of Osteoarthritis Symptom Control  Outcome: Progressing  Intervention: Maintain Osteoarthritis Symptom Control  Flowsheets (Taken 4/29/2024 1951)  Assistive Device Utilized:   gait belt   walker  Activity Management: Rolling - L1  Medication Review/Management: medications reviewed     Problem: Pain Chronic (Persistent) (Comorbidity Management)  Goal: Acceptable Pain Control and Functional Ability  Outcome: Progressing  Intervention: Develop Pain Management Plan  Flowsheets (Taken 4/29/2024 1951)  Pain Management Interventions:   care clustered   pain management plan reviewed with patient/caregiver  Intervention: Manage Persistent Pain  Flowsheets (Taken 4/29/2024 1951)  Sleep/Rest Enhancement:   awakenings minimized   regular sleep/rest pattern promoted   noise level reduced   relaxation techniques promoted  Bowel Elimination Promotion:   adequate fluid intake promoted   ambulation promoted  Medication Review/Management: medications reviewed  Intervention: Optimize Psychosocial Wellbeing  Flowsheets (Taken 4/29/2024 1951)  Spiritual Activities Assistance: affirmation provided  Supportive Measures: active listening utilized  Diversional Activities: television  Family/Support System Care: support provided     Problem: Skin Injury Risk Increased  Goal: Skin Health and Integrity  Outcome: Progressing  Intervention: Optimize Skin Protection  Flowsheets (Taken 4/29/2024 1951)  Pressure Reduction Techniques: frequent weight shift encouraged  Pressure Reduction Devices: positioning supports utilized  Skin Protection: incontinence pads utilized  Activity  Management: Rolling - L1  Head of Bed (HOB) Positioning: HOB at 30 degrees  Intervention: Promote and Optimize Oral Intake  Flowsheets (Taken 4/29/2024 1951)  Oral Nutrition Promotion:   adaptive equipment use encouraged   calorie-dense foods provided  Nutrition Interventions: food preferences provided

## 2024-04-30 NOTE — PLAN OF CARE
Problem: Adult Inpatient Plan of Care  Goal: Plan of Care Review  Outcome: Progressing  Goal: Patient-Specific Goal (Individualized)  Outcome: Progressing  Flowsheets (Taken 4/30/2024 1420)  Anxieties, Fears or Concerns: none expressed  Individualized Care Needs: safety with mobility, pain management, encourage pt/ot  Goal: Absence of Hospital-Acquired Illness or Injury  Outcome: Progressing  Intervention: Identify and Manage Fall Risk  Flowsheets (Taken 4/30/2024 1420)  Safety Promotion/Fall Prevention:   assistive device/personal item within reach   bed alarm set   nonskid shoes/socks when out of bed   side rails raised x 2  Intervention: Prevent Skin Injury  Flowsheets (Taken 4/30/2024 1420)  Body Position: supine  Skin Protection:   drying agents applied   skin sealant/moisture barrier applied   incontinence pads utilized  Goal: Optimal Comfort and Wellbeing  Outcome: Progressing  Goal: Readiness for Transition of Care  Outcome: Progressing     Problem: Infection  Goal: Absence of Infection Signs and Symptoms  Outcome: Progressing  Intervention: Prevent or Manage Infection  Flowsheets (Taken 4/30/2024 1420)  Infection Management: aseptic technique maintained  Isolation Precautions: protective     Problem: Fall Injury Risk  Goal: Absence of Fall and Fall-Related Injury  Outcome: Progressing     Problem: Swallowing Impairment  Goal: Optimal Eating and Swallowing Without Aspiration  Outcome: Progressing  Intervention: Optimize Eating and Swallowing  Flowsheets (Taken 4/30/2024 1420)  Aspiration Precautions: awake/alert before oral intake  Swallowing Interventions: Dysphagia: food and liquid intake alternated     Problem: Hypertension Comorbidity  Goal: Blood Pressure in Desired Range  Outcome: Progressing  Intervention: Maintain Blood Pressure Management  Flowsheets (Taken 4/30/2024 1420)  Medication Review/Management: medications reviewed     Problem: Osteoarthritis Comorbidity  Goal: Maintenance of  Osteoarthritis Symptom Control  Outcome: Progressing     Problem: Pain Chronic (Persistent) (Comorbidity Management)  Goal: Acceptable Pain Control and Functional Ability  Outcome: Progressing  Intervention: Develop Pain Management Plan  Flowsheets (Taken 4/30/2024 1420)  Pain Management Interventions:   care clustered   premedicated for activity   position adjusted     Problem: Skin Injury Risk Increased  Goal: Skin Health and Integrity  Outcome: Progressing  Intervention: Optimize Skin Protection  Flowsheets (Taken 4/30/2024 1420)  Pressure Reduction Techniques: frequent weight shift encouraged  Skin Protection:   drying agents applied   skin sealant/moisture barrier applied   incontinence pads utilized  Activity Management: Rolling - L1  Head of Bed (HOB) Positioning: HOB at 20-30 degrees

## 2024-04-30 NOTE — PT/OT/SLP PROGRESS
Speech Language Pathology Treatment    Patient Name:  Terence Tanner Jr.   MRN:  [id number]  Admitting Diagnosis: Closed fracture of right hip    Recommendations:                 General Recommendations:  Dysphagia therapy  Diet recommendations:  Soft bite sized diet with gravy (IDDSI 6) with mildly thickened liquids (IDDSI 2) ,     Aspiration Precautions: 1 bite/sip at a time, Alternating bites/sips, Chin tuck, HOB to 90 degrees, and Meds crushed in puree   General Precautions: Standard,    Communication strategies:  go to room if call light pushed    Assessment:     Terence Tanner Jr. is a 79 y.o. male with an SLP diagnosis of Dysphagia.  He presents with moderate oropharyngeal dysphagia.    Subjective     Pt seen in bed, pleasant and cooperative.  Wife, present for portion of session.  Patient goals: Get leg better, stronger,  and return home     Pain/Comfort:       Respiratory Status: Room air    Objective:     Has the patient been evaluated by SLP for swallowing?   Yes   Keep patient NPO?     Current Respiratory Status:         Completed tongue base and pharyngeal exercises 10x/8. Tolerated mildly thickened liquids without s/s of aspiration.    Reviewed diet with patient and wife.  Both verbalized understanding and agreement.        Goals:   Multidisciplinary Problems       SLP Goals          Problem: SLP    Goal Priority Disciplines Outcome   SLP Goal     SLP    Description: Goals:   Long Term Goal:  1.  Tolerate least restrictive diet and liquids without s/s of aspiration  Short Term Goals:    1. Tolerate soft and bite sized diet with gravy (IDDSI 6) with mildly thick liquids  (IDDSI 2)  2. Complete tongue base and pharyngeal exercises 10x/5 sets  3. Verbalize and demonstrate swallowing strategies of chin tuck and alternating solids/liquids  4. Recommendation of possible esophagram-GI consult.                         Plan:     Patient to be seen:  3-5x/week    Plan of Care expires:  5/18/24   Plan of Care  reviewed with:   Patient and treatment team   SLP Follow-Up:  yes        Discharge recommendations:      Barriers to Discharge:  Level of Skilled Assistance Needed   and Safety Awareness      Time Tracking:     SLP Treatment Date: 4/30/24      Speech Start Time:   11:15 AM  Speech Stop Time:   11:45 AM  Speech Total Time (min):  30    Billable Minutes: Treatment Swallowing Dysfunction 30 04/30/2024

## 2024-04-30 NOTE — PT/OT/SLP PROGRESS
Speech Language Pathology      Terence YANIV Finnmichelet Hernandez.  MRN: [id number]    Patient not seen today secondary to diarrhea . Will follow-up on 4/30/24.  Attempted to see patient two times..

## 2024-04-30 NOTE — PROGRESS NOTES
[facility]      Name: Terence Tanner Jr.  : 1945  MRN: [id number]  Date: 2024    Chief Complaint   Patient presents with    Right Femur - Post-op Evaluation     2.5 WEEK F/U IMN RIGHT IT FEMUR FX, Reports increase in pain over last three days. Currently working with physical therapy.        Subjective:       Patient ID: Terence Tanner Jr. is a 79 y.o. male.    Chief Complaint   Patient presents with    Right Femur - Post-op Evaluation     2.5 WEEK F/U IMN RIGHT IT FEMUR FX, Reports increase in pain over last three days. Currently working with physical therapy.         HPI  79-year-old male presents to clinic for 2.5 week follow up status post IMN right femur for intertrochanteric fracture.  He is currently in a rehab facility and receives physical therapy 3 hours a day.  Reports his incision is well.  He does have intermittent soreness of lateral right hip associated with physical activity.  No other complaints at this time.    ROS:  Constitutional: Denies fever chills  MSK: Pain evident at site of injury located in HPI,   Integ: No signs of abrasions or lacerations  Neuro: No numbness or tingling  Lymphatic: No swelling outside the area of injury     Current Outpatient Medications   Medication Instructions    acetaminophen 325 mg, Oral    alendronate (FOSAMAX) 70 mg, Oral, Every 7 days    aspirin (ECOTRIN) 81 mg, Oral, 2 times daily    calcium-vitamin D3 (OS-SARAI 500 + D3) 500 mg-5 mcg (200 unit) per tablet 1 tablet, Oral, 2 times daily    cloNIDine (CATAPRES) 0.2 mg, Oral, 3 times daily PRN    gabapentin (NEURONTIN) 100 mg, Oral    lisinopriL (PRINIVIL,ZESTRIL) 40 mg, Oral, Daily    methylPREDNISolone (MEDROL) 4 mg, Oral, Daily    oxyCODONE (ROXICODONE) 10 mg, Oral, Every 6 hours PRN    polyethylene glycol (GLYCOLAX) 17 g, Oral, 2 times daily PRN    senna-docusate 8.6-50 mg (PERICOLACE) 8.6-50 mg per tablet 2 tablets, Oral, 2 times daily          Objective:      Visit Vitals  BP  "107/68   Pulse 97   Ht 5' 9" (1.753 m)   Wt 62 kg (136 lb 11 oz)   BMI 20.18 kg/m²     Physical Exam    General the patient is alert and oriented x3 no acute distress nontoxic-appearing appropriate affect.    Constitutional: Vital signs are examined and stable.  Resp: No signs of labored breathing                      RLE: -Skin:  Incision well healed.  Staples present and removed today.           -MSK:  Tolerates passive range of motion of hip and knee.  EHL/FHL intact.  Able to weightbear without pain.             -Neuro:  Sensation intact to light touch L3-S1 dermatomes           -Lymphatic: No signs of lymphadenopathy           -CV: PT pulse palpable. Compartments soft and compressible.          Body mass index is 20.18 kg/m².  Ideal body weight: 70.7 kg (155 lb 13.8 oz)  Hgb   Date Value Ref Range Status   04/29/2024 10.5 (L) 14.0 - 18.0 g/dL Final   04/23/2024 10.6 (L) 14.0 - 18.0 g/dL Final     Hct   Date Value Ref Range Status   04/29/2024 32.3 (L) 42.0 - 52.0 % Final   04/23/2024 32.5 (L) 42.0 - 52.0 % Final     Vit D 25 OH   Date Value Ref Range Status   04/12/2024 14.3 (L) 30.0 - 80.0 ng/mL Final     WBC   Date Value Ref Range Status   04/29/2024 6.09 4.50 - 11.50 x10(3)/mcL Final   04/23/2024 7.74 4.50 - 11.50 x10(3)/mcL Final               Assessment:           ICD-10-CM ICD-9-CM   1. Closed displaced intertrochanteric fracture of right femur, initial encounter  S72.141A 820.21   2. Closed fracture of right hip, initial encounter  S72.001A 820.8           Plan:         No follow-ups on file.    1. Closed displaced intertrochanteric fracture of right femur, initial encounter    2. Closed fracture of right hip, initial encounter      Incision has healed well.  Staples removed today.  Patient may continue weight-bearing as tolerated to right lower extremity.  Full range of motion as tolerated.  Patient currently in rehab facility.  He thinks he may be discharged this week.  We will follow up in 1 month for " repeat x-rays and evaluation.    Pt verbalizes understanding and agrees with tx plan. Pt to call clinic with any questions/concerns.      The above findings, diagnostics, and treatment plan were discussed with Dr. Tatum who is in agreement with the plan of care except as stated in additional documentation.     Ashley Gunther, PA-C [facility]        Future Appointments   Date Time Provider Department Center   5/30/2024 10:15 AM Stu Tatum MD [facility] [place]

## 2024-05-01 VITALS
HEART RATE: 101 BPM | WEIGHT: 137.56 LBS | OXYGEN SATURATION: 95 % | TEMPERATURE: 97 F | SYSTOLIC BLOOD PRESSURE: 149 MMHG | BODY MASS INDEX: 20.38 KG/M2 | HEIGHT: 69 IN | RESPIRATION RATE: 18 BRPM | DIASTOLIC BLOOD PRESSURE: 80 MMHG

## 2024-05-01 PROCEDURE — 25000003 PHARM REV CODE 250: Performed by: STUDENT IN AN ORGANIZED HEALTH CARE EDUCATION/TRAINING PROGRAM

## 2024-05-01 PROCEDURE — 25000003 PHARM REV CODE 250: Performed by: PHYSICIAN ASSISTANT

## 2024-05-01 PROCEDURE — 92526 ORAL FUNCTION THERAPY: CPT

## 2024-05-01 PROCEDURE — 97535 SELF CARE MNGMENT TRAINING: CPT

## 2024-05-01 PROCEDURE — 25000003 PHARM REV CODE 250: Performed by: INTERNAL MEDICINE

## 2024-05-01 PROCEDURE — 63600175 PHARM REV CODE 636 W HCPCS: Performed by: STUDENT IN AN ORGANIZED HEALTH CARE EDUCATION/TRAINING PROGRAM

## 2024-05-01 PROCEDURE — 97535 SELF CARE MNGMENT TRAINING: CPT | Mod: CQ

## 2024-05-01 PROCEDURE — 97530 THERAPEUTIC ACTIVITIES: CPT

## 2024-05-01 RX ORDER — PANTOPRAZOLE SODIUM 40 MG/1
40 TABLET, DELAYED RELEASE ORAL DAILY
Qty: 30 TABLET | Refills: 0 | Status: SHIPPED | OUTPATIENT
Start: 2024-05-02 | End: 2024-06-01

## 2024-05-01 RX ORDER — LISINOPRIL 40 MG/1
40 TABLET ORAL DAILY
Qty: 30 TABLET | Refills: 0 | Status: SHIPPED | OUTPATIENT
Start: 2024-05-01 | End: 2024-05-31

## 2024-05-01 RX ORDER — METHOCARBAMOL 750 MG/1
750 TABLET, FILM COATED ORAL 3 TIMES DAILY PRN
Qty: 7 TABLET | Refills: 0 | Status: SHIPPED | OUTPATIENT
Start: 2024-05-01 | End: 2024-05-08

## 2024-05-01 RX ORDER — FOLIC ACID 1 MG/1
1 TABLET ORAL DAILY
Qty: 30 TABLET | Refills: 0 | Status: SHIPPED | OUTPATIENT
Start: 2024-05-02 | End: 2024-06-01

## 2024-05-01 RX ORDER — LIDOCAINE 50 MG/G
1 PATCH TOPICAL DAILY PRN
Qty: 30 PATCH | Refills: 0 | Status: SHIPPED | OUTPATIENT
Start: 2024-05-01

## 2024-05-01 RX ORDER — ACETAMINOPHEN AND CODEINE PHOSPHATE 300; 30 MG/1; MG/1
1 TABLET ORAL EVERY 8 HOURS PRN
Qty: 7 TABLET | Refills: 0 | Status: SHIPPED | OUTPATIENT
Start: 2024-05-01 | End: 2024-05-08

## 2024-05-01 RX ADMIN — Medication 1 TABLET: at 08:05

## 2024-05-01 RX ADMIN — LIDOCAINE 5% 1 PATCH: 700 PATCH TOPICAL at 08:05

## 2024-05-01 RX ADMIN — METHOCARBAMOL 750 MG: 750 TABLET ORAL at 08:05

## 2024-05-01 RX ADMIN — PANTOPRAZOLE SODIUM 40 MG: 40 TABLET, DELAYED RELEASE ORAL at 08:05

## 2024-05-01 RX ADMIN — LISINOPRIL 40 MG: 20 TABLET ORAL at 08:05

## 2024-05-01 RX ADMIN — METHYLPREDNISOLONE 4 MG: 4 TABLET ORAL at 08:05

## 2024-05-01 RX ADMIN — FOLIC ACID 1 MG: 1 TABLET ORAL at 08:05

## 2024-05-01 NOTE — PATIENT CARE CONFERENCE
Name: Terence Tanner Jr.    : 1945 (79 y.o.)  MRN: [id number]            Interdisciplinary Team Conference     Case conference held with patient/family and care team to discuss progress, plan of care, barriers to be addressed for safe return home, equipment recommendations, and discharge planning. Communicated therapy progress with MD, RN, therapy clinicians and case management. All questions/concerns answered.

## 2024-05-01 NOTE — PT/OT/SLP PROGRESS
Name: Terence Tanner Jr.    : 1945 (79 y.o.)  MRN: [id number]            Interdisciplinary Team Conference     Case conference held with patient/family and care team to discuss progress, plan of care, barriers to be addressed for safe return home, equipment recommendations, and discharge planning. Communicated therapy progress with MD, RN, therapy clinicians and case management. All questions/concerns answered.

## 2024-05-01 NOTE — PATIENT CARE CONFERENCE
Name: Terence Tanner Jr.    : 1945 (79 y.o.)  MRN: [id number]            Interdisciplinary Team Conference     Case conference held with patient/family and care team to discuss progress, plan of care, barriers to be addressed for safe return home, equipment recommendations, and discharge planning. Communicated therapy progress with MD, RN, therapy clinicians and case management. All questions/concerns answered.

## 2024-05-01 NOTE — PROGRESS NOTES
Discharge instructions explained to pt and spouse. All questions answered at this time. Co-pay card given for new blood thinner medication (eliquis). Pt and wife verbalized understanding of new medication. Pt wheeled to personal vehicle with all belongings.

## 2024-05-01 NOTE — PLAN OF CARE
Problem: Adult Inpatient Plan of Care  Goal: Plan of Care Review  Outcome: Progressing  Flowsheets (Taken 4/30/2024 2000)  Plan of Care Reviewed With:   patient   spouse  Goal: Patient-Specific Goal (Individualized)  Outcome: Progressing  Flowsheets (Taken 4/30/2024 2000)  Anxieties, Fears or Concerns: none voiced  Individualized Care Needs: crush meds in pudding, control pain  Goal: Absence of Hospital-Acquired Illness or Injury  Outcome: Progressing  Intervention: Prevent Skin Injury  Flowsheets (Taken 4/30/2024 2000)  Body Position: position maintained  Skin Protection:   incontinence pads utilized   protective footwear used

## 2024-05-01 NOTE — PROGRESS NOTES
Nutrition Education:    Patient educated on: Dysphagia Diet and soft bite sized mildly thick liquids .      Teaching Method:  Explanation and Printed Materials    Patient's Understanding: Verbalizes understanding. Family participation will help with compliance.     Barriers to learning: Desire / Motivation    Expected Compliance:  good    All questions were answered. Dietitian's contact information provided.

## 2024-05-01 NOTE — DISCHARGE SUMMARY
"[facility]  HOSPITAL MEDICINE - DISCHARGE SUMMARY    Patient Name: Terence Tanner Jr.  MRN: [id number]  Admission Date: 4/17/2024  Discharge Date: 05/01/2024  Hospital Length of Stay: 14 days  Discharge Provider: JOEY Brooks  Primary Care Provider: Whitney, Primary Doctor    HOSPITAL COURSE     79 years old male with a past medical history of rheumatoid arthritis, osteoarthritis, osteopenia, hx of partial colon resection ("black spot on colon" - reportedly non cancerous), and newly diagnosed HTN who initially presented to [facility] s/p fall at home resulting in a right intertrochanteric femur fracture. Patient subsequently underwent IM nailing of right intertrochanteric femur fracture on 04/11/2024 with Dr. Tatum.  Patient was noted to have elevated blood pressure readings during admission therefore was initiated on lisinopril, currently receiving 40 mg daily. Patient was transferred to our swing unit for continued therapy.  On exam today, patient denies any complaints.  He does endorse an episode of choking while eating breakfast this morning but contributes this to the dry food.  Will obtain SLP eval prior to initiating diet.  At baseline, patient does use an assistive device to walk, mainly Rollator.  He also has a powered wheelchair, a walker, a ramp at home, and lives with his wife who does assist with ADLs as needed.     Patient did have a difficult time with pain control between recent surgery and RA. Chronically on methylprednisone 4 mg daily, which was increased to 8mg for 3 days without any significant relief. Patient found most relief with Tylenol No. 3 and lidocaine patches. Post op follow up on 04/30/2024 removed staples and recommended to continue WBAT and full ROM as tolerated to the right lower extremity.  Patient continued to progress with therapy and is currently CGA/SBA for all tasks. Patient will be discharged home with home health services today. Patient does not " require DME for discharge. Ortho initially recommended Aspirin 81mg BID upon discharge for DVT prevention.  Patient is allergic to NSAIDs.  Nursing staff reached out to Dr. Tatum's office and spoke with nurse Yenny who recommended patient to do low-dose Eliquis for 30 days.  Patient will be discharged home on Eliquis 2.5 mg b.i.d. for DVT prevention.    PHYSICAL EXAM     Most Recent Vital Signs:  Temp: 97.4 °F (36.3 °C) (05/01/24 0747)  Pulse: 101 (05/01/24 0747)  Resp: 18 (05/01/24 0747)  BP: (!) 149/80 (05/01/24 0849)  SpO2: 95 % (05/01/24 0747)     GENERAL: In no acute distress, afebrile  HEENT:  Atraumatic, normocephalic, moist mucous membranes  CHEST: Clear to auscultation bilaterally  HEART: S1, S2, no appreciable murmur  ABDOMEN: Soft, nontender, BS +  MSK: Warm, no lower extremity edema, RA changes throughout   NEUROLOGIC: Alert and oriented x4, moving all extremities   INTEGUMENTARY: Right hip incision without erythema or drainage  PSYCHIATRY: Appropriate mood and affect     DISCHARGE DIAGNOSIS     R intertrochanteric femur fracture  Mechanical Fall  Underwent IM nailing of right intertrochanteric femur fracture on 04/11/2024 with Dr. Tatum  Lovenox for DVT prevention, and orthopedics recommends aspirin 81 mg b.i.d. upon discharge, but patient is allergic. - Eliquis 2.5mg BID  PT/OT  Seen by America Schaeffer PA-C 04/30/2024 - staples removed, continue WBAT and full ROM as tolerated to RLE   Pain control as needed - continue prn Tylenol No. 3, lidocaine patch  Follow up with Dr. Tatum on 05/30/2024 at 1015    Dysphagia  Small hiatal hernia with Schatzki's ring  MBS on 04/18/2024 - SLP recommended soft and bite size diet with gravy and mildly thick liquids  Esophagram revealed small sliding hiatal hernia with subtle Schatzki's ring   Protonix daily for 8 weeks     Post-op anemia   Normocytic anemia  Iron panel more consistent with anemia of chronic disease   Folate low normal, start supplementation     HTN    Currently receiving Lisinopril 40mg QD     Rheumatoid arthritis, osteoarthritis  Continue home methylprednisone 4 mg daily  PPI during admission     Klebsiella pneumoniae and Klebsiella aerogenes +sputum   Completed Cipro PO x5 days     Patient's screen for food insecurity, housing instability, transportation needs, utility difficulties, and interpersonal safety. Social work consulted for  home health services and Eliquis discount/coupon card.   _____________________________________________________________________________    DISCHARGE MED REC     Current Discharge Medication List        START taking these medications    Details   acetaminophen-codeine 300-30mg (TYLENOL #3) 300-30 mg Tab Take 1 tablet by mouth every 8 (eight) hours as needed (right hip pain).  Qty: 7 tablet, Refills: 0    Comments: Quantity prescribed more than 7 day supply? No      apixaban (ELIQUIS) 2.5 mg Tab Take 1 tablet (2.5 mg total) by mouth 2 (two) times daily.  Qty: 60 tablet, Refills: 0      folic acid (FOLVITE) 1 MG tablet Take 1 tablet (1 mg total) by mouth once daily.  Qty: 30 tablet, Refills: 0      LIDOcaine (LIDODERM) 5 % Place 1 patch onto the skin daily as needed (right hip and knee pain). Remove & Discard patch within 12 hours or as directed by MD  Qty: 30 patch, Refills: 0      pantoprazole (PROTONIX) 40 MG tablet Take 1 tablet (40 mg total) by mouth once daily.  Qty: 30 tablet, Refills: 0           CONTINUE these medications which have CHANGED    Details   lisinopriL (PRINIVIL,ZESTRIL) 40 MG tablet Take 1 tablet (40 mg total) by mouth once daily.  Qty: 30 tablet, Refills: 0    Comments: .      methocarbamoL (ROBAXIN) 750 MG Tab Take 1 tablet (750 mg total) by mouth 3 (three) times daily as needed (muscle spasms).  Qty: 7 tablet, Refills: 0           CONTINUE these medications which have NOT CHANGED    Details   acetaminophen 325 mg Cap Take 325 mg by mouth.      alendronate (FOSAMAX) 70 MG tablet Take 70 mg by mouth every 7  days.      calcium-vitamin D3 (OS-SARAI 500 + D3) 500 mg-5 mcg (200 unit) per tablet Take 1 tablet by mouth 2 (two) times daily.      methylPREDNISolone (MEDROL) 4 MG Tab Take 4 mg by mouth once daily.           STOP taking these medications       aspirin (ECOTRIN) 81 MG EC tablet Comments:   Reason for Stopping:         cloNIDine (CATAPRES) 0.2 MG tablet Comments:   Reason for Stopping:         gabapentin (NEURONTIN) 100 MG capsule Comments:   Reason for Stopping:         oxyCODONE (ROXICODONE) 10 mg Tab immediate release tablet Comments:   Reason for Stopping:         polyethylene glycol (GLYCOLAX) 17 gram PwPk Comments:   Reason for Stopping:         senna-docusate 8.6-50 mg (PERICOLACE) 8.6-50 mg per tablet Comments:   Reason for Stopping:              CONSULTS     FOLLOW UP      Follow-up Information       Stu Tatum MD. Go on 5/30/2024.    Specialty: Orthopedic Surgery  Why: Follow up appointment with Stu Tatum MD on Thursday, May 30, 2024 @ 10:15 am.  Spoke to Mahesh.  Contact information:  [address] [place] [id number] [phone]               Bertin Lopez MD. Schedule an appointment as soon as possible for a visit.    Specialty: Family Medicine  Why: Spoke to Charlene and the  prefers the patient to make their own appointment.  Contact information:  [address]  [place] [id number] [phone]               Pelon [place] [facility] Follow up.    Specialty: Home Health Services  Contact information:  [address] [place] [address] [address]  [facility] [id number] [phone]                           DISCHARGE INSTRUCTIONS     Explained in detail to the patient about the discharge plan, medications, and follow-up visits. Pt understands and agrees with the treatment plan.  Discharged Condition: stable  Diet as tolerated  Activities as tolerated  Discharge to: Home-Health Care [facility]    TIME SPENT ON DISCHARGE     35 minutes    JOEY Brooks  Internal Medicine  Department of [facility]  Medicine [facility]    This document was created using electronic dictation services.  Please excuse any errors that may have been made.  Contact me if any questions regarding documentation to clarify verbiage.

## 2024-05-01 NOTE — PLAN OF CARE
Problem: Adult Inpatient Plan of Care  Goal: Plan of Care Review  Outcome: Met  Goal: Patient-Specific Goal (Individualized)  Outcome: Met  Goal: Absence of Hospital-Acquired Illness or Injury  Outcome: Met  Goal: Optimal Comfort and Wellbeing  Outcome: Met  Goal: Readiness for Transition of Care  Outcome: Met     Problem: Infection  Goal: Absence of Infection Signs and Symptoms  Outcome: Met     Problem: Fall Injury Risk  Goal: Absence of Fall and Fall-Related Injury  Outcome: Met     Problem: Swallowing Impairment  Goal: Optimal Eating and Swallowing Without Aspiration  Outcome: Met     Problem: Hypertension Comorbidity  Goal: Blood Pressure in Desired Range  Outcome: Met     Problem: Osteoarthritis Comorbidity  Goal: Maintenance of Osteoarthritis Symptom Control  Outcome: Met     Problem: Pain Chronic (Persistent) (Comorbidity Management)  Goal: Acceptable Pain Control and Functional Ability  Outcome: Met     Problem: Skin Injury Risk Increased  Goal: Skin Health and Integrity  Outcome: Met

## 2024-05-01 NOTE — PLAN OF CARE
Problem: Occupational Therapy  Goal: Occupational Therapy Goal  Description: Goals to be met by: 5/17/2024     Patient will increase functional independence with ADLs by performing:    LE Dressing with Stand-by Assistance.-met  Grooming while standing at sink with Stand-by Assistance.-met  Toileting from toilet with Stand-by Assistance for hygiene and clothing management.-met  Toilet transfer to toilet with Stand-by Assistance.-met    Outcome: Met

## 2024-05-01 NOTE — PLAN OF CARE
Weekly Staffing Report      Date Admitted: 4/17/2024 :   Staffing Date: 5/1/2024     Patient Active Problem List   Diagnosis    Closed fracture of right hip s/p repair    Hypertension    Closed displaced intertrochanteric fracture of right femur          Team Members Present:       Nursing Present     Yes [x]    No []    Physical Therapy Present    Yes [x]    No []    Occupational Therapy Present     Yes [x]    No []    Speech Therapy Present    Yes [x]    No []    NA []    Dietary Present    Yes [x]    No []        Physician Present     [x] Thairy Reyes    [] Indira Galdamez    [x] JOEY Hilliard    []       Family Present    [] Adult Children    [x] Spouse    [] POA    [] Friend/ Caregiver    [] Other       Interdisciplinary Meeting Notes:  Pt- walking 50ft with RW. Bed mobility SBA. Will practice car transfers today. OT- SBA for most of ADLS. ST- discharge on same diet and thickened liquids. Appetite- good. Medically- Pain better. Bowel movements better, no loose stools. Plan to discharge home today with home health.                 Please see Documented PT/OT/ST, Dietary, Nursing, and Physician notes for detailed treatment information.

## 2024-05-01 NOTE — DISCHARGE INSTRUCTIONS
Please follow all discharge instructions as given. KEEP ALL FOLLOW UP APPOINTMENTS!        If you experience any worsening or NEW signs or symptoms please call your primary care provider or head to your nearest emergency department.           THANK YOU FOR CHOOSING [facility].  If you have any questions please call [phone].

## 2024-05-01 NOTE — PT/OT/SLP PROGRESS
Physical Therapy Treatment Note           Name: Terence Tanner Jr.    : 1945 (79 y.o.)  MRN: [id number]           TREATMENT SUMMARY AND RECOMMENDATIONS:    PT Received On: 24  PT Start Time: 1015     PT Stop Time: 1030  PT Total Time (min): 15 min     Subjective Assessment:   No complaints  Lethargic   x Awake, alert, cooperative  Uncooperative    Agitated x c/o pain    Appropriate x c/o fatigue    Confused  Treated at bedside     Emotionally labile  Treated in gym/dept.    Impulsive  Other:    Flat affect       Therapy Precautions:    Cognitive deficits  Spinal precautions    Collar - hard  Sternal precautions    Collar - soft   TLSO   x Fall risk  LSO    Hip precautions - posterior  Knee immobilizer    Hip precautions - anterior x WBAT    Impaired communication  Partial weightbearing    Oxygen  TTWB    PEG tube  NWB    Visual deficits  Other:    Hearing deficits          Treatment Objectives:     Mobility Training:   Assist level Comments    Bed mobility MOD I Supine<>Sit   Transfer SBA Bed<>WC, WC<>Car and Car transfers with family vehicle     Gait     Sit to stand transitions SBA    Sitting balance     Standing balance      Wheelchair mobility     Car transfer     Other:          Therapeutic Exercise:   Exercise Sets Reps Comments                               Additional Comments:  Truck transfer completed without issues. Ready for DC>     Assessment: Patient tolerated session well     PT Plan: DC today.   Revisions made to plan of care: No    GOALS:   Multidisciplinary Problems       Physical Therapy Goals          Problem: Physical Therapy    Goal Priority Disciplines Outcome Goal Variances Interventions   Physical Therapy Goal     PT, PT/OT Progressing     Description: Goals to be met by: Discharge      Patient will increase functional independence with mobility by performin. Sit to stand transfer with Modified [place]  2. Bed to chair transfer with Modified [place] using  Rolling Walker  3. Gait  x at least 50 feet with Modified [place] and up to 175 feet with SBA using Rolling Walker.   4. Increased functional strength to 4/5 for R LE  5. Supine<>Sit with MOD I.                         Skilled PT Minutes Provided: 15  Communication with Treatment Team:     Equipment recommendations:       At end of treatment, patient remained:   Up in chair     Up in wheelchair in room   x In bed    With alarm activated   x Bed rails up   x Call bell in reach    x Family/friends present    Restraints secured properly    In bathroom with CNA/RN notified    Nurse aware    In gym with therapist/tech    Other:

## 2024-05-01 NOTE — PT/OT/SLP DISCHARGE
Occupational Therapy Discharge Summary    Terence Tanner Jr.  MRN: [id number]   Principal Problem: Closed fracture of right hip      Patient Discharged from acute Occupational Therapy on 5/1/2024.    Assessment:      Patient has met all goals and is not appropriate for therapy. Patient appropriate for care in another setting.    Objective:     GOALS:   Multidisciplinary Problems       Occupational Therapy Goals       Not on file              Multidisciplinary Problems (Resolved)          Problem: Occupational Therapy    Goal Priority Disciplines Outcome Interventions   Occupational Therapy Goal   (Resolved)     OT, PT/OT Met    Description: Goals to be met by: 5/17/2024     Patient will increase functional independence with ADLs by performing:    LE Dressing with Stand-by Assistance.-met  Grooming while standing at sink with Stand-by Assistance.-met  Toileting from toilet with Stand-by Assistance for hygiene and clothing management.-met  Toilet transfer to toilet with Stand-by Assistance.-met                         Reasons for Discontinuation of Therapy Services  Transfer to alternate level of care. and Satisfactory goal achievement.      Plan:     Patient Discharged to: Home with Home Health Service    5/1/2024     clear

## 2024-05-01 NOTE — NURSING
Spoke with Dr. Tatum's nurse, Yenny, told her patient was recommenced to take aspirin BID at D/C for DVT prevention per physician note. Patient has an allergy to NSAIDS. Was told that if patient unable to take aspirin they would recommend low dose eliquis for 30 days.       PA aware.

## 2024-05-02 NOTE — PLAN OF CARE
Problem: Physical Therapy  Goal: Physical Therapy Goal  Description: Goals to be met by: Discharge      Patient will increase functional independence with mobility by performin. Sit to stand transfer with Modified [place]  2. Bed to chair transfer with Modified [place] using Rolling Walker  3. Gait  x at least 50 feet with Modified [place] and up to 175 feet with SBA using Rolling Walker.   4. Increased functional strength to 4/5 for R LE  5. Supine<>Sit with MOD I.    Outcome: Adequate for Care Transition

## 2024-05-02 NOTE — PT/OT/SLP DISCHARGE
Physical Therapy Discharge Summary    Name: Terence Tanner Jr.  MRN: [id number]   Principal Problem: Closed fracture of right hip     Patient Discharged from acute Physical Therapy on .  Please refer to prior PT noted date on  for functional status.     Assessment:     Patient appropriate for care in another setting.    Objective:     GOALS:   Multidisciplinary Problems       Physical Therapy Goals          Problem: Physical Therapy    Goal Priority Disciplines Outcome Goal Variances Interventions   Physical Therapy Goal     PT, PT/OT Adequate for Care Transition     Description: Goals to be met by: Discharge      Patient will increase functional independence with mobility by performin. Sit to stand transfer with Modified [place]  2. Bed to chair transfer with Modified [place] using Rolling Walker  3. Gait  x at least 50 feet with Modified [place] and up to 175 feet with SBA using Rolling Walker.   4. Increased functional strength to 4/5 for R LE  5. Supine<>Sit with MOD I.                         Reasons for Discontinuation of Therapy Services  Transfer to alternate level of care. and Satisfactory goal achievement.      Plan:     Patient Discharged to: Home with Home Health Service.      2024

## 2024-05-20 NOTE — PLAN OF CARE
[facility]  Discharge Final Note    Primary Care Provider: No, Primary Doctor    Expected Discharge Date: 5/1/2024    Final Discharge Note (most recent)       Final Note - 05/20/24 1440          Final Note    Assessment Type Final Discharge Note     Anticipated Discharge Disposition Home-Health Care [facility]     What phone number can be called within the next 1-3 days to see how you are doing after discharge? [phone]     Hospital Resources/Appts/Education Provided Provided patient/caregiver with written discharge plan information        Post-Acute Status    Post-Acute Authorization Home Health     Home Health Status Set-up Complete/Auth obtained     Patient choice form signed by patient/caregiver List with quality metrics by geographic area provided     Discharge Delays None known at this time                     Important Message from Medicare             Contact Info       Stu Tatum MD   Specialty: Orthopedic Surgery    [address] [address]   Phone: [phone]       Next Steps: Go on 5/30/2024    Instructions: Follow up appointment with Stu Tatum MD on Thursday, May 30, 2024 @ 10:15 am.  Spoke to Bertin Daniel MD   Specialty: Family Medicine    [address] [place] [address]   Phone: [phone]       Next Steps: Schedule an appointment as soon as possible for a visit    Instructions: Spoke to Charlene and the  prefers the patient to make their own appointment.    [facility]   Specialty: Home Health Services    [address] [place] [address]. [address]  [facility] [address]   Phone: [phone]       Next Steps: Follow up

## 2024-05-30 ENCOUNTER — OFFICE VISIT (OUTPATIENT)
Dept: ORTHOPEDICS | Facility: CLINIC | Age: 79
End: 2024-05-30
Payer: MEDICARE

## 2024-05-30 ENCOUNTER — HOSPITAL ENCOUNTER (OUTPATIENT)
Dept: RADIOLOGY | Facility: CLINIC | Age: 79
Discharge: HOME | End: 2024-05-30
Attending: ORTHOPAEDIC SURGERY
Payer: MEDICARE

## 2024-05-30 VITALS
HEIGHT: 69 IN | WEIGHT: 136.69 LBS | HEART RATE: 94 BPM | DIASTOLIC BLOOD PRESSURE: 76 MMHG | SYSTOLIC BLOOD PRESSURE: 150 MMHG | BODY MASS INDEX: 20.24 KG/M2

## 2024-05-30 DIAGNOSIS — S72.141D CLOSED DISPLACED INTERTROCHANTERIC FRACTURE OF RIGHT FEMUR WITH ROUTINE HEALING, SUBSEQUENT ENCOUNTER: Primary | ICD-10-CM

## 2024-05-30 DIAGNOSIS — S72.141D CLOSED DISPLACED INTERTROCHANTERIC FRACTURE OF RIGHT FEMUR WITH ROUTINE HEALING, SUBSEQUENT ENCOUNTER: ICD-10-CM

## 2024-05-30 PROCEDURE — 1160F RVW MEDS BY RX/DR IN RCRD: CPT | Mod: CPTII,,,

## 2024-05-30 PROCEDURE — 1159F MED LIST DOCD IN RCRD: CPT | Mod: CPTII,,,

## 2024-05-30 PROCEDURE — 3077F SYST BP >= 140 MM HG: CPT | Mod: CPTII,,,

## 2024-05-30 PROCEDURE — 3288F FALL RISK ASSESSMENT DOCD: CPT | Mod: CPTII,,,

## 2024-05-30 PROCEDURE — 99024 POSTOP FOLLOW-UP VISIT: CPT | Mod: ,,,

## 2024-05-30 PROCEDURE — 73502 X-RAY EXAM HIP UNI 2-3 VIEWS: CPT | Mod: RT,,, | Performed by: ORTHOPAEDIC SURGERY

## 2024-05-30 PROCEDURE — 3078F DIAST BP <80 MM HG: CPT | Mod: CPTII,,,

## 2024-05-30 PROCEDURE — 1101F PT FALLS ASSESS-DOCD LE1/YR: CPT | Mod: CPTII,,,

## 2024-05-30 RX ORDER — TIZANIDINE 2 MG/1
4 TABLET ORAL EVERY 8 HOURS PRN
Qty: 30 TABLET | Refills: 0 | Status: SHIPPED | OUTPATIENT
Start: 2024-05-30 | End: 2024-06-09

## 2024-05-30 NOTE — PROGRESS NOTES
[facility]      Name: Terence Tanner Jr.  : 1945  MRN: [id number]  Date: 2024    Chief Complaint   Patient presents with    Follow-up     7 wks, IMN RIGHT IT FEMUR FX, SX 24 gl 7/10/24, states still having some pain but manageable, gets around using Rolator, wbat, has no other complaints at this time,         Subjective:       Patient ID: Terence Tanner Jr. is a 79 y.o. male.    Chief Complaint   Patient presents with    Follow-up     7 wks, IMN RIGHT IT FEMUR FX, SX 24 gl 7/10/24, states still having some pain but manageable, gets around using Rolator, wbat, has no other complaints at this time,          HPI  Patient presents to clinic for 7 week follow up status post intramedullary nail of right femur for intertrochanteric fracture.  His wife is present with him today.  He uses a Rollator to ambulate.  Pain is well-controlled.  It has not taking narcotic pain medication.  Does state that the muscle relaxer helps with his pain but the pill is too large and he can not swallow it.  His wife has been crushing the tablet for him to take and is requesting a smaller medication.  No other complaints at this time.    ROS:  Constitutional: Denies fever chills  MSK: Pain evident at site of injury located in HPI,   Integ: No signs of abrasions or lacerations  Neuro: No numbness or tingling  Lymphatic: No swelling outside the area of injury     Current Outpatient Medications   Medication Instructions    acetaminophen 325 mg, Oral    alendronate (FOSAMAX) 70 mg, Oral, Every 7 days    apixaban (ELIQUIS) 2.5 mg, Oral, 2 times daily    calcium-vitamin D3 (OS-SARAI 500 + D3) 500 mg-5 mcg (200 unit) per tablet 1 tablet, Oral, 2 times daily    folic acid (FOLVITE) 1 mg, Oral, Daily    LIDOcaine (LIDODERM) 5 % 1 patch, Transdermal, Daily PRN, Remove & Discard patch within 12 hours or as directed by MD    lisinopriL (PRINIVIL,ZESTRIL) 40 mg, Oral, Daily    methylPREDNISolone (MEDROL) 4 mg,  "Oral, Daily    pantoprazole (PROTONIX) 40 mg, Oral, Daily    tiZANidine (ZANAFLEX) 4 mg, Oral, Every 8 hours PRN          Objective:      Visit Vitals  BP (!) 150/76   Pulse 94   Ht 5' 9" (1.753 m)   Wt 62 kg (136 lb 11 oz)   BMI 20.18 kg/m²     Physical Exam    General the patient is alert and oriented x3 no acute distress nontoxic-appearing appropriate affect.    Constitutional: Vital signs are examined and stable.  Resp: No signs of labored breathing                      RLE: -Skin: warm and dry           -MSK: Hip and Knee F/E, EHL/FHL, Gastroc/Tib anterior motor intact.  No painful or prominent hardware.  Tolerates gentle passive range of motion of hip and knee.  Ambulates with Rollator.           -Neuro:  Sensation intact to light touch L3-S1 dermatomes           -Lymphatic: No signs of lymphadenopathy           -CV:  Posterior tibialis pulse palpable.  Compartments soft and compressible.          Body mass index is 20.18 kg/m².  Ideal body weight: 70.7 kg (155 lb 13.8 oz)  Hgb   Date Value Ref Range Status   04/29/2024 10.5 (L) 14.0 - 18.0 g/dL Final   04/23/2024 10.6 (L) 14.0 - 18.0 g/dL Final     Hct   Date Value Ref Range Status   04/29/2024 32.3 (L) 42.0 - 52.0 % Final   04/23/2024 32.5 (L) 42.0 - 52.0 % Final     Vitamin D   Date Value Ref Range Status   04/12/2024 14.3 (L) 30.0 - 80.0 ng/mL Final     WBC   Date Value Ref Range Status   04/29/2024 6.09 4.50 - 11.50 x10(3)/mcL Final   04/23/2024 7.74 4.50 - 11.50 x10(3)/mcL Final       Radiology:   XR right hip: Imaging reviewed by Dr. Tatum and myself.  Hardware intact.  Fracture with proper alignment         Assessment:           ICD-10-CM ICD-9-CM   1. Closed displaced intertrochanteric fracture of right femur with routine healing, subsequent encounter  S72.141D V54.13           Plan:         No follow-ups on file.    1. Closed displaced intertrochanteric fracture of right femur with routine healing, subsequent encounter  -     X-Ray Hip 2 or 3 views " Right with Pelvis when performed; Future; Expected date: 05/30/2024  -     tiZANidine (ZANAFLEX) 2 MG tablet; Take 2 tablets (4 mg total) by mouth every 8 (eight) hours as needed (muscle spasm/pain).  Dispense: 30 tablet; Refill: 0      Patient doing well at this time.  Pain is well-controlled.  He has not taking narcotic medication for pain control.  He does state that the muscle relaxant helps him sometimes but he has to crush his medications and it is a large pill that is difficult to crush.  Would like to try a smaller muscle relaxant.  We discussed use of tizanidine.  Patient to call if this medication does not work well for him.  Patient may continue weight-bearing as tolerated to right lower extremity.  He has currently using a Rollator.  Offered physical therapy order which was declined at this time.  Patient and his wife feel he is doing well at home on his own.  Return to clinic in 6 weeks for x-rays and re-evaluation.  Pt verbalizes understanding and agrees with tx plan. Pt to call clinic with any questions/concerns.      The above findings, diagnostics, and treatment plan were discussed with Dr. Tatum who is in agreement with the plan of care except as stated in additional documentation.     Ashley Gunther, PA-C [facility]        Future Appointments   Date Time Provider Department Center   7/15/2024 10:30 AM Stu Tatum MD [facility] [place]     This note was created with the assistance of voice recognition software or phone dictation. There may be transcription errors as a result of using this technology however minimal. Effort has been made to assure accuracy of transcription but any obvious errors or omissions should be clarified with the author of the document.

## 2024-07-15 ENCOUNTER — HOSPITAL ENCOUNTER (OUTPATIENT)
Dept: RADIOLOGY | Facility: CLINIC | Age: 79
Discharge: HOME | End: 2024-07-15
Attending: ORTHOPAEDIC SURGERY
Payer: MEDICARE

## 2024-07-15 ENCOUNTER — OFFICE VISIT (OUTPATIENT)
Dept: ORTHOPEDICS | Facility: CLINIC | Age: 79
End: 2024-07-15
Payer: MEDICARE

## 2024-07-15 VITALS
BODY MASS INDEX: 20.24 KG/M2 | SYSTOLIC BLOOD PRESSURE: 151 MMHG | RESPIRATION RATE: 18 BRPM | HEART RATE: 96 BPM | HEIGHT: 69 IN | DIASTOLIC BLOOD PRESSURE: 88 MMHG | WEIGHT: 136.69 LBS

## 2024-07-15 DIAGNOSIS — S72.141D CLOSED DISPLACED INTERTROCHANTERIC FRACTURE OF RIGHT FEMUR WITH ROUTINE HEALING, SUBSEQUENT ENCOUNTER: Primary | ICD-10-CM

## 2024-07-15 DIAGNOSIS — S72.141D CLOSED DISPLACED INTERTROCHANTERIC FRACTURE OF RIGHT FEMUR WITH ROUTINE HEALING, SUBSEQUENT ENCOUNTER: ICD-10-CM

## 2024-07-15 PROCEDURE — 3077F SYST BP >= 140 MM HG: CPT | Mod: CPTII,,,

## 2024-07-15 PROCEDURE — 3079F DIAST BP 80-89 MM HG: CPT | Mod: CPTII,,,

## 2024-07-15 PROCEDURE — 1159F MED LIST DOCD IN RCRD: CPT | Mod: CPTII,,,

## 2024-07-15 PROCEDURE — 3288F FALL RISK ASSESSMENT DOCD: CPT | Mod: CPTII,,,

## 2024-07-15 PROCEDURE — 73502 X-RAY EXAM HIP UNI 2-3 VIEWS: CPT | Mod: RT,,, | Performed by: ORTHOPAEDIC SURGERY

## 2024-07-15 PROCEDURE — 1160F RVW MEDS BY RX/DR IN RCRD: CPT | Mod: CPTII,,,

## 2024-07-15 PROCEDURE — 99213 OFFICE O/P EST LOW 20 MIN: CPT | Mod: ,,,

## 2024-07-15 PROCEDURE — 1101F PT FALLS ASSESS-DOCD LE1/YR: CPT | Mod: CPTII,,,

## 2024-07-15 RX ORDER — TIZANIDINE 2 MG/1
4 TABLET ORAL EVERY 8 HOURS PRN
Qty: 30 TABLET | Refills: 0 | Status: SHIPPED | OUTPATIENT
Start: 2024-07-15 | End: 2024-07-15 | Stop reason: SDUPTHER

## 2024-07-15 RX ORDER — TIZANIDINE 2 MG/1
4 TABLET ORAL EVERY 8 HOURS PRN
Qty: 30 TABLET | Refills: 0 | Status: SHIPPED | OUTPATIENT
Start: 2024-07-15 | End: 2024-07-29

## 2024-07-16 NOTE — PROGRESS NOTES
[facility]      Name: Terence Tanner Jr.  : 1945  MRN: [id number]  Date: 07/15/2024    Chief Complaint   Patient presents with    Right Femur - Follow-up     3 month f/u IMN RIGHT FEMUR FX, REPORTS PAIN, CRAMPING RIGHT THIGH, GROIN.         Subjective:       Patient ID: Terence Tanner Jr. is a 79 y.o. male.    Chief Complaint   Patient presents with    Right Femur - Follow-up     3 month f/u IMN RIGHT FEMUR FX, REPORTS PAIN, CRAMPING RIGHT THIGH, GROIN.          HPI  Patient presents to clinic for 3 mo follow up status post intramedullary nail of right femur for intertrochanteric fracture.  His wife is present with him today.  He uses a Rollator to ambulate.  Pt states he has intermittent pain, sometimes in his hip, other times his knee or thigh.  When hip pain occurs it is lateral over the trochanter or anterior.  Pt reports he has a long history of arthritis.  Pain well controlled at this time.  Otherwise, he is doing well.  We discussed PT and he is not open to going to PT at this time.     ROS:  Cardiopulm: no CP or difficulty breathing  Constitutional: Denies fever chills  MSK: Pain evident at site of injury located in HPI,   Integ: No signs of abrasions or lacerations  Neuro: No numbness or tingling  Lymphatic: No swelling     Current Outpatient Medications   Medication Instructions    acetaminophen 325 mg, Oral    alendronate (FOSAMAX) 70 mg, Oral, Every 7 days    calcium-vitamin D3 (OS-SARAI 500 + D3) 500 mg-5 mcg (200 unit) per tablet 1 tablet, Oral, 2 times daily    folic acid (FOLVITE) 1 mg, Oral, Daily    LIDOcaine (LIDODERM) 5 % 1 patch, Transdermal, Daily PRN, Remove & Discard patch within 12 hours or as directed by MD    lisinopriL (PRINIVIL,ZESTRIL) 40 mg, Oral, Daily    methylPREDNISolone (MEDROL) 4 mg, Oral, Daily    pantoprazole (PROTONIX) 40 mg, Oral, Daily    tiZANidine (ZANAFLEX) 4 mg, Oral, Every 8 hours PRN          Objective:      Visit Vitals  BP (!) 151/88   Pulse  "96   Resp 18   Ht 5' 9" (1.753 m)   Wt 62 kg (136 lb 11 oz)   BMI 20.18 kg/m²     Physical Exam    General the patient is alert and oriented x3 no acute distress nontoxic-appearing appropriate affect.    Constitutional: Vital signs are examined and stable.  Resp: No signs of labored breathing                      RLE: -Skin: warm and dry           -MSK: Hip and Knee F/E, EHL/FHL, Gastroc/Tib anterior motor intact.  No painful or prominent hardware.  TTP over greater trochanter consistent with bursitis.  Tolerates gentle passive range of motion of hip and knee.  Ambulates with Rollator.           -Neuro:  Sensation intact to light touch L3-S1 dermatomes           -Lymphatic: No signs of lymphadenopathy           -CV:  Posterior tibialis pulse palpable.  Compartments soft and compressible.          Body mass index is 20.18 kg/m².  Ideal body weight: 70.7 kg (155 lb 13.8 oz)  Hgb   Date Value Ref Range Status   04/29/2024 10.5 (L) 14.0 - 18.0 g/dL Final   04/23/2024 10.6 (L) 14.0 - 18.0 g/dL Final     Hct   Date Value Ref Range Status   04/29/2024 32.3 (L) 42.0 - 52.0 % Final   04/23/2024 32.5 (L) 42.0 - 52.0 % Final     Vitamin D   Date Value Ref Range Status   04/12/2024 14.3 (L) 30.0 - 80.0 ng/mL Final     WBC   Date Value Ref Range Status   04/29/2024 6.09 4.50 - 11.50 x10(3)/mcL Final   04/23/2024 7.74 4.50 - 11.50 x10(3)/mcL Final       Radiology:   XR right hip: Imaging reviewed by Dr. Tatum and myself.  Hardware intact.  Fracture aligned.        Assessment:           ICD-10-CM ICD-9-CM   1. Closed displaced intertrochanteric fracture of right femur with routine healing, subsequent encounter  S72.141D V54.13           Plan:         No follow-ups on file.    1. Closed displaced intertrochanteric fracture of right femur with routine healing, subsequent encounter  -     X-Ray Hip 2 or 3 views Right with Pelvis when performed; Future; Expected date: 07/15/2024  -     tiZANidine (ZANAFLEX) 2 MG tablet; Take 2 " tablets (4 mg total) by mouth every 8 (eight) hours as needed (muscle spasm/pain).  Dispense: 30 tablet; Refill: 0      Patient doing well at this time.  Pain is intermittent.  We did have a discussion about beginning PT and pt and his wife would like to defer this.  X-ray shows fully consolidated fracture.  Since patient is still having some intermittent pain, would like to see patient back in 2-3 months.  Patient and his wife are requesting to return at the four-month alok given the may be out of state visiting their son.  Allowed time for questions.  Questions answered to pt satisfaction.  Pt verbalizes understanding and agrees with tx plan. Pt to call clinic with any questions/concerns.      The above findings, diagnostics, and treatment plan were discussed with Dr. Tatum who is in agreement with the plan of care except as stated in additional documentation.     Ashley Gunther, PA-C [facility]        Future Appointments   Date Time Provider Department Center   11/18/2024 10:30 AM Stu Tatum MD [facility] [place]     This note was created with the assistance of voice recognition software or phone dictation. There may be transcription errors as a result of using this technology however minimal. Effort has been made to assure accuracy of transcription but any obvious errors or omissions should be clarified with the author of the document.

## 2025-06-10 DIAGNOSIS — M25.569 PAIN IN UNSPECIFIED KNEE: Primary | ICD-10-CM

## 2025-06-18 ENCOUNTER — OFFICE VISIT (OUTPATIENT)
Dept: ORTHOPEDICS | Facility: CLINIC | Age: 80
End: 2025-06-18
Payer: MEDICARE

## 2025-06-18 VITALS
BODY MASS INDEX: 20.24 KG/M2 | HEART RATE: 102 BPM | HEIGHT: 69 IN | SYSTOLIC BLOOD PRESSURE: 175 MMHG | WEIGHT: 136.69 LBS | DIASTOLIC BLOOD PRESSURE: 92 MMHG

## 2025-06-18 DIAGNOSIS — M17.0 BILATERAL PRIMARY OSTEOARTHRITIS OF KNEE: ICD-10-CM

## 2025-06-18 DIAGNOSIS — M25.562 PAIN IN BOTH KNEES, UNSPECIFIED CHRONICITY: Primary | ICD-10-CM

## 2025-06-18 DIAGNOSIS — M25.569 PAIN IN UNSPECIFIED KNEE: ICD-10-CM

## 2025-06-18 DIAGNOSIS — M25.561 PAIN IN BOTH KNEES, UNSPECIFIED CHRONICITY: Primary | ICD-10-CM

## 2025-06-18 RX ORDER — LIDOCAINE HYDROCHLORIDE 20 MG/ML
2 INJECTION, SOLUTION INFILTRATION; PERINEURAL
Status: DISCONTINUED | OUTPATIENT
Start: 2025-06-18 | End: 2025-06-18 | Stop reason: HOSPADM

## 2025-06-18 RX ORDER — METHYLPREDNISOLONE ACETATE 80 MG/ML
80 INJECTION, SUSPENSION INTRA-ARTICULAR; INTRALESIONAL; INTRAMUSCULAR; SOFT TISSUE
Status: DISCONTINUED | OUTPATIENT
Start: 2025-06-18 | End: 2025-06-18 | Stop reason: HOSPADM

## 2025-06-18 RX ADMIN — METHYLPREDNISOLONE ACETATE 80 MG: 80 INJECTION, SUSPENSION INTRA-ARTICULAR; INTRALESIONAL; INTRAMUSCULAR; SOFT TISSUE at 02:06

## 2025-06-18 RX ADMIN — LIDOCAINE HYDROCHLORIDE 2 MG: 20 INJECTION, SOLUTION INFILTRATION; PERINEURAL at 02:06

## 2025-06-18 NOTE — PROGRESS NOTES
"Subjective:    CC: Pain of the Left Knee and Pain of the Right Knee (LOULOU knee pain. Started hurting 10 years ago and getting more bowed out. Can stand for a short time before they give out on him. Falls a lot and his most recent fall was 2 weeks ago. Pain is constant. Pt states he's allergic to NSAIDS and can't take anything for the pain. Has swelling when he twists it more than he should. Wears brace on R knee. Has tingling constantly. Ambulating with walker. No other concerns with them.)       HPI:  Patient comes in today for his 1st visit.  Patient complains of bilateral knee pain.  Patient states he does have a history of underlying severe arthritis.  It has been going along for many years.  He has had many falls in the past.  He has had injections in the past as well.  He was walking with the assistance, minimal ambulator, presently with family.    ROS: Refer to HPI for pertinent ROS. All other 12 point systems negative.    Objective:  Vitals:    06/18/25 1400   BP: (!) 175/92   BP Location: Right arm   Patient Position: Sitting   Pulse: 102   Weight: 62 kg (136 lb 11 oz)   Height: 5' 9" (1.753 m)        Physical Exam:  The patient is well-nourished, well-developed, in no apparent distress, pleasant and cooperative. Examination of the bilateral lower extremities,  compartments are soft and warm. Skin is intact. There are no signs or symptoms of DVT or infection. There is no obvious effusion. There is no erythema. Tenderness to palpation along the medial joint line umbilical going to bilaterally with varus deformity, right knee range of motion is 20-85; left knee range of motion is 20-85. The knee is stable to stressing with varus and valgus. Negative anterior and posterior drawer.   Negative Lachman´s.  Negative Sallie's test. Patella grind is positive, negative for apprehension.  Patient is neurovascularly intact distally.      Images:  X-rays three views left knee demonstrate severe tricompartmental " osteoarthritis. Images Reviewed and discussed with patient.    Assessment:  1. Pain in both knees, unspecified chronicity  - X-Ray Knee 3 View Left; Future    2. Pain in unspecified knee  - Ambulatory referral/consult to Orthopedics    3. Bilateral primary osteoarthritis of knee  - Large Joint Aspiration/Injection: L knee        Plan:  At this time we discussed his physical exam and x-ray findings.  We have discussed various treatment options going forward.  We have discussed formal therapy, low-impact activities, knee bracing.  Under sterile technique he tolerated the steroid injection very well through the anterolateral portal of the left knee.  I would like see him back in 2 weeks to see how he is progressing.    Follow UP: No follow-ups on file.    Large Joint Aspiration/Injection: L knee    Date/Time: 6/18/2025 2:30 PM    Performed by: Ankit Barrientos MD  Authorized by: Ankit Barrientos MD    Consent Done?:  Yes (Verbal)  Indications:  Pain  Site marked: the procedure site was marked    Prep: patient was prepped and draped in usual sterile fashion    Approach:  Anterolateral  Location:  Knee  Site:  L knee  Medications:  2 mg LIDOcaine HCL 20 mg/ml (2%) 20 mg/mL (2 %); 80 mg methylPREDNISolone acetate 80 mg/mL  Patient tolerance:  Patient tolerated the procedure well with no immediate complications

## 2025-06-18 NOTE — PROCEDURES
Large Joint Aspiration/Injection: L knee    Date/Time: 6/18/2025 2:30 PM    Performed by: Ankit Barrientos MD  Authorized by: Ankit Barrientos MD    Consent Done?:  Yes (Verbal)  Indications:  Pain  Site marked: the procedure site was marked    Prep: patient was prepped and draped in usual sterile fashion    Approach:  Anterolateral  Location:  Knee  Site:  L knee  Medications:  2 mg LIDOcaine HCL 20 mg/ml (2%) 20 mg/mL (2 %); 80 mg methylPREDNISolone acetate 80 mg/mL  Patient tolerance:  Patient tolerated the procedure well with no immediate complications

## 2025-07-02 ENCOUNTER — OFFICE VISIT (OUTPATIENT)
Dept: ORTHOPEDICS | Facility: CLINIC | Age: 80
End: 2025-07-02
Payer: MEDICARE

## 2025-07-02 DIAGNOSIS — M17.11 LOCALIZED OSTEOARTHRITIS OF RIGHT KNEE: Primary | ICD-10-CM

## 2025-07-02 DIAGNOSIS — M17.0 BILATERAL PRIMARY OSTEOARTHRITIS OF KNEE: ICD-10-CM

## 2025-07-02 PROCEDURE — 99214 OFFICE O/P EST MOD 30 MIN: CPT | Mod: 25,,, | Performed by: ORTHOPAEDIC SURGERY

## 2025-07-02 PROCEDURE — 1159F MED LIST DOCD IN RCRD: CPT | Mod: CPTII,,, | Performed by: ORTHOPAEDIC SURGERY

## 2025-07-02 PROCEDURE — 20610 DRAIN/INJ JOINT/BURSA W/O US: CPT | Mod: RT,,, | Performed by: ORTHOPAEDIC SURGERY

## 2025-07-02 PROCEDURE — 1160F RVW MEDS BY RX/DR IN RCRD: CPT | Mod: CPTII,,, | Performed by: ORTHOPAEDIC SURGERY

## 2025-07-02 RX ADMIN — METHYLPREDNISOLONE ACETATE 80 MG: 80 INJECTION, SUSPENSION INTRA-ARTICULAR; INTRALESIONAL; INTRAMUSCULAR; SOFT TISSUE at 01:07

## 2025-07-02 RX ADMIN — LIDOCAINE HYDROCHLORIDE 2 MG: 20 INJECTION, SOLUTION INFILTRATION; PERINEURAL at 01:07

## 2025-07-02 NOTE — PROCEDURES
Large Joint Aspiration/Injection: R knee    Date/Time: 7/2/2025 1:45 PM    Performed by: Ankit Barrientos MD  Authorized by: Ankit Barrientos MD    Consent Done?:  Yes (Verbal)  Indications:  Pain  Site marked: the procedure site was marked    Prep: patient was prepped and draped in usual sterile fashion    Approach:  Anterolateral  Location:  Knee  Site:  R knee  Medications:  2 mg LIDOcaine HCL 20 mg/ml (2%) 20 mg/mL (2 %); 80 mg methylPREDNISolone acetate 80 mg/mL  Patient tolerance:  Patient tolerated the procedure well with no immediate complications

## 2025-07-02 NOTE — PROGRESS NOTES
Subjective:    CC: Follow-up and Injections of the Left Knee (F/U L knee inj 6/18/25. Pt states knee is doing ok. Catches a lot and has pain. Pt states inj helped but lasted for about a week. Taking tylenol for the pain. Requesting an inj in R knee today.)       HPI:  Patient returns today for repeat exam.  Patient continues to have pain in his right knee.  He has having some pain with long standing walking and bending.  Patient had a previous left knee injection which did help.    ROS: Refer to HPI for pertinent ROS. All other 12 point systems negative.    Objective:  There were no vitals filed for this visit.     Physical Exam:  The patient is well-nourished, well-developed and in no apparent distress, pleasant and cooperative. Examination of the right lower extremity compartments are soft and warm. Skin is intact. There are no signs or symptoms of DVT or infection. There is no obvious joint effusion. There is no erythema. Tender to palpation along the anterior aspect , right knee range of motion is 0-100. The knee is stable to exam with varus and valgus stressing. Negative anterior and posterior drawer. Negative Lachman´s.  Negative Sallie's test. Patella grind is positive, Negative for apprehension. Neurovascularly intact distally.    Images: . Images Reviewed and discussed with patient.    Assessment:  1. Localized osteoarthritis of right knee  - Large Joint Aspiration/Injection: R knee        Plan:  At this time we discussed his physical exam and previous imaging findings.  Patient will continue low-impact activities, knee exercises.  Under sterile technique he tolerated the steroid injection very well through the anterolateral portal of the right knee.  I would like see him back in 4 months if needed    Follow UP: No follow-ups on file.    Large Joint Aspiration/Injection: R knee    Date/Time: 7/2/2025 1:45 PM    Performed by: Ankit Barrientos MD  Authorized by: Ankit Barrientos MD    Consent Done?:  Yes  (Verbal)  Indications:  Pain  Site marked: the procedure site was marked    Prep: patient was prepped and draped in usual sterile fashion    Approach:  Anterolateral  Location:  Knee  Site:  R knee  Medications:  2 mg LIDOcaine HCL 20 mg/ml (2%) 20 mg/mL (2 %); 80 mg methylPREDNISolone acetate 80 mg/mL  Patient tolerance:  Patient tolerated the procedure well with no immediate complications

## 2025-07-09 RX ORDER — LIDOCAINE HYDROCHLORIDE 20 MG/ML
2 INJECTION, SOLUTION INFILTRATION; PERINEURAL
Status: DISCONTINUED | OUTPATIENT
Start: 2025-07-02 | End: 2025-07-09 | Stop reason: HOSPADM

## 2025-07-09 RX ORDER — METHYLPREDNISOLONE ACETATE 80 MG/ML
80 INJECTION, SUSPENSION INTRA-ARTICULAR; INTRALESIONAL; INTRAMUSCULAR; SOFT TISSUE
Status: DISCONTINUED | OUTPATIENT
Start: 2025-07-02 | End: 2025-07-09 | Stop reason: HOSPADM

## (undated) DEVICE — GLOVE 8 PROTEXIS PI ORTHO

## (undated) DEVICE — GOWN SURGICAL XX LARGE X LONG

## (undated) DEVICE — STAPLER SKIN PROXIMATE WIDE

## (undated) DEVICE — SPONGE COTTON TRAY 4X4IN

## (undated) DEVICE — KIT SURGICAL TURNOVER

## (undated) DEVICE — SPONGE GAUZE 4X4 12 PLY STRL

## (undated) DEVICE — GLOVE PROTEXIS HYDROGEL SZ6.5

## (undated) DEVICE — PAD ABDOMINAL STERILE 8X10IN

## (undated) DEVICE — DRAPE INCISE IOBAN 2 13X13IN

## (undated) DEVICE — DRAPE IOBAN 2 STERI

## (undated) DEVICE — DRESSING ISLAND TELFA 4X5IN

## (undated) DEVICE — GLOVE PROTEXIS BLUE LATEX 8.5

## (undated) DEVICE — BLADE SURG CARBON STEEL #10

## (undated) DEVICE — WIRE GUIDE 3.2MM 400MM
Type: IMPLANTABLE DEVICE | Site: FEMUR | Status: NON-FUNCTIONAL
Removed: 2024-04-11

## (undated) DEVICE — SUT CTD VICRYL CT-1 27

## (undated) DEVICE — GLOVE SIGNATURE MICRO LTX 6.5

## (undated) DEVICE — Device

## (undated) DEVICE — SPONGE LAP 18X18 PREWASHED

## (undated) DEVICE — MARKER FN REG DUAL UTIL RULER

## (undated) DEVICE — COVER C-ARM STRAP BAND 44X80IN

## (undated) DEVICE — SUT VICRYL PLUS 2-0 CT1 18

## (undated) DEVICE — ELECTRODE REM POLYHESIVE II

## (undated) DEVICE — SEE MEDLINE ITEM 146298

## (undated) DEVICE — GOWN SMARTSLEEVE AAMI LVL4 XL

## (undated) DEVICE — GLOVE SENSICARE PI GRN 6.5

## (undated) DEVICE — APPLICATOR CHLORAPREP ORN 26ML

## (undated) DEVICE — GLOVE 6.5 PROTEXIS PI BLUE

## (undated) DEVICE — DRAPE STERI U-SHAPED 47X51IN

## (undated) DEVICE — TAPE SILK 3IN

## (undated) DEVICE — COVER TABLE HVY DTY 60X90IN

## (undated) DEVICE — COVER FULLGUARD SHOE HIGH-TOP

## (undated) DEVICE — BLADE SURG STAINLESS STEEL #15

## (undated) DEVICE — DRESSING XEROFORM 5X9IN

## (undated) DEVICE — ELECTRODE PATIENT RETURN DISP

## (undated) DEVICE — DRAPE C-ARMOR EQUIPMENT COVER

## (undated) DEVICE — SUT ETHILON 2-0 FS 18IN BLK

## (undated) DEVICE — GLOVE PROTEXIS LTX MICRO 8

## (undated) DEVICE — COVER SHOE FLUID RESISTANT XL

## (undated) DEVICE — SOL NACL IRR 1000ML BTL

## (undated) DEVICE — DRESSING XEROFORM NONADH 1X8IN

## (undated) DEVICE — BIT DRILL QCK-CPLG 4.2MM